# Patient Record
Sex: FEMALE | Race: BLACK OR AFRICAN AMERICAN | Employment: PART TIME | URBAN - METROPOLITAN AREA
[De-identification: names, ages, dates, MRNs, and addresses within clinical notes are randomized per-mention and may not be internally consistent; named-entity substitution may affect disease eponyms.]

---

## 2017-05-22 ENCOUNTER — HOSPITAL ENCOUNTER (EMERGENCY)
Facility: HOSPITAL | Age: 52
Discharge: HOME | End: 2017-05-22
Payer: MEDICAID

## 2017-05-22 ENCOUNTER — APPOINTMENT (OUTPATIENT)
Dept: GENERAL RADIOLOGY | Facility: HOSPITAL | Age: 52
End: 2017-05-22
Payer: MEDICAID

## 2017-05-22 VITALS
RESPIRATION RATE: 16 BRPM | HEIGHT: 63 IN | BODY MASS INDEX: 35.08 KG/M2 | SYSTOLIC BLOOD PRESSURE: 136 MMHG | OXYGEN SATURATION: 99 % | DIASTOLIC BLOOD PRESSURE: 95 MMHG | WEIGHT: 198 LBS | HEART RATE: 62 BPM | TEMPERATURE: 98 F

## 2017-05-22 DIAGNOSIS — R07.9 CHEST PAIN OF UNCERTAIN ETIOLOGY: Primary | ICD-10-CM

## 2017-05-22 PROCEDURE — 71020 XR CHEST PA + LAT CHEST (CPT=71020): CPT

## 2017-05-22 PROCEDURE — 36415 COLL VENOUS BLD VENIPUNCTURE: CPT

## 2017-05-22 PROCEDURE — 84484 ASSAY OF TROPONIN QUANT: CPT

## 2017-05-22 PROCEDURE — 93005 ELECTROCARDIOGRAM TRACING: CPT

## 2017-05-22 PROCEDURE — 99285 EMERGENCY DEPT VISIT HI MDM: CPT

## 2017-05-22 PROCEDURE — 93010 ELECTROCARDIOGRAM REPORT: CPT | Performed by: INTERNAL MEDICINE

## 2017-05-22 PROCEDURE — 80048 BASIC METABOLIC PNL TOTAL CA: CPT

## 2017-05-22 PROCEDURE — 85025 COMPLETE CBC W/AUTO DIFF WBC: CPT

## 2017-05-22 RX ORDER — PANTOPRAZOLE SODIUM 40 MG/1
40 TABLET, DELAYED RELEASE ORAL DAILY
Qty: 30 TABLET | Refills: 0 | Status: SHIPPED | OUTPATIENT
Start: 2017-05-22 | End: 2017-06-21

## 2017-05-23 NOTE — ED PROVIDER NOTES
Patient Seen in: [facility] AND [facility] Emergency Department    History   Patient presents with:  Chest Pain Angina (cardiovascular)      HPI    The patient presents complaining of left-sided chest pain that radiates to her left shoulder.   She states that sym as otherwise stated in HPI.     Physical Exam       ED Triage Vitals   BP 05/22/17 1324 176/89 mmHg   Pulse 05/22/17 1324 52   Resp 05/22/17 1324 16   Temp 05/22/17 1324 97.8 °F (36.6 °C)   Temp src 05/22/17 1324 Oral   SpO2 05/22/17 1324 99 %   O2 Device 0 ------                     CBC W/ DIFFERENTIAL[[id number]]          Abnormal            Final result                 Please view results for these tests on the individual orders.    RAINBOW DRAW BLUE   RAINBOW DRAW GOLD   RAINBOW DRAW LAVENDER   RAINBOW

## 2019-01-17 ENCOUNTER — HOSPITAL ENCOUNTER (EMERGENCY)
Facility: HOSPITAL | Age: 54
Discharge: HOME | End: 2019-01-18
Attending: EMERGENCY MEDICINE
Payer: MEDICAID

## 2019-01-17 VITALS
WEIGHT: 198 LBS | HEIGHT: 62.5 IN | TEMPERATURE: 98 F | BODY MASS INDEX: 35.52 KG/M2 | DIASTOLIC BLOOD PRESSURE: 78 MMHG | SYSTOLIC BLOOD PRESSURE: 140 MMHG | HEART RATE: 60 BPM | OXYGEN SATURATION: 99 % | RESPIRATION RATE: 19 BRPM

## 2019-01-17 DIAGNOSIS — R11.0 NAUSEA: ICD-10-CM

## 2019-01-17 DIAGNOSIS — R10.13 EPIGASTRIC PAIN: Primary | ICD-10-CM

## 2019-01-17 LAB
ALBUMIN SERPL BCP-MCNC: 3.4 G/DL (ref 3.5–4.8)
ALP SERPL-CCNC: 97 U/L (ref 32–100)
ALT SERPL-CCNC: 18 U/L (ref 14–54)
ANION GAP SERPL CALC-SCNC: 15 MMOL/L (ref 0–18)
AST SERPL-CCNC: 23 U/L (ref 15–41)
BASOPHILS # BLD: 0.1 K/UL (ref 0–0.2)
BASOPHILS NFR BLD: 1 %
BILIRUB DIRECT SERPL-MCNC: 0.2 MG/DL (ref 0–0.2)
BILIRUB SERPL-MCNC: 0.4 MG/DL (ref 0.3–1.2)
BUN SERPL-MCNC: 20 MG/DL (ref 8–20)
BUN/CREAT SERPL: 17.9 (ref 10–20)
CALCIUM SERPL-MCNC: 9.7 MG/DL (ref 8.5–10.5)
CHLORIDE SERPL-SCNC: 99 MMOL/L (ref 95–110)
CO2 SERPL-SCNC: 25 MMOL/L (ref 22–32)
CREAT SERPL-MCNC: 1.12 MG/DL (ref 0.5–1.5)
EOSINOPHIL # BLD: 0.1 K/UL (ref 0–0.7)
EOSINOPHIL NFR BLD: 2 %
ERYTHROCYTE [DISTWIDTH] IN BLOOD BY AUTOMATED COUNT: 14.1 % (ref 11–15)
GLUCOSE SERPL-MCNC: 98 MG/DL (ref 70–99)
HCT VFR BLD AUTO: 41.2 % (ref 35–48)
HGB BLD-MCNC: 13.4 G/DL (ref 12–16)
LIPASE SERPL-CCNC: 64 U/L (ref 22–51)
LYMPHOCYTES # BLD: 2.6 K/UL (ref 1–4)
LYMPHOCYTES NFR BLD: 38 %
MCH RBC QN AUTO: 25 PG (ref 27–32)
MCHC RBC AUTO-ENTMCNC: 32.6 G/DL (ref 32–37)
MCV RBC AUTO: 76.8 FL (ref 80–100)
MONOCYTES # BLD: 0.7 K/UL (ref 0–1)
MONOCYTES NFR BLD: 10 %
NEUTROPHILS # BLD AUTO: 3.4 K/UL (ref 1.8–7.7)
NEUTROPHILS NFR BLD: 49 %
OSMOLALITY UR CALC.SUM OF ELEC: 291 MOSM/KG (ref 275–295)
PLATELET # BLD AUTO: 208 K/UL (ref 140–400)
PMV BLD AUTO: 9 FL (ref 7.4–10.3)
POTASSIUM SERPL-SCNC: 3.5 MMOL/L (ref 3.3–5.1)
PROT SERPL-MCNC: 7.2 G/DL (ref 5.9–8.4)
RBC # BLD AUTO: 5.37 M/UL (ref 3.7–5.4)
SODIUM SERPL-SCNC: 139 MMOL/L (ref 136–144)
TROPONIN I SERPL-MCNC: 0 NG/ML (ref ?–0.03)
TROPONIN I SERPL-MCNC: 0 NG/ML (ref ?–0.03)
WBC # BLD AUTO: 6.8 K/UL (ref 4–11)

## 2019-01-17 PROCEDURE — 93010 ELECTROCARDIOGRAM REPORT: CPT | Performed by: EMERGENCY MEDICINE

## 2019-01-17 PROCEDURE — 80076 HEPATIC FUNCTION PANEL: CPT | Performed by: EMERGENCY MEDICINE

## 2019-01-17 PROCEDURE — 99284 EMERGENCY DEPT VISIT MOD MDM: CPT

## 2019-01-17 PROCEDURE — 83690 ASSAY OF LIPASE: CPT | Performed by: EMERGENCY MEDICINE

## 2019-01-17 PROCEDURE — 80048 BASIC METABOLIC PNL TOTAL CA: CPT | Performed by: EMERGENCY MEDICINE

## 2019-01-17 PROCEDURE — 85025 COMPLETE CBC W/AUTO DIFF WBC: CPT | Performed by: EMERGENCY MEDICINE

## 2019-01-17 PROCEDURE — 84484 ASSAY OF TROPONIN QUANT: CPT | Performed by: EMERGENCY MEDICINE

## 2019-01-17 PROCEDURE — 93005 ELECTROCARDIOGRAM TRACING: CPT

## 2019-01-17 PROCEDURE — 36415 COLL VENOUS BLD VENIPUNCTURE: CPT

## 2019-01-17 RX ORDER — FAMOTIDINE 20 MG/1
10 TABLET ORAL ONCE
Status: COMPLETED | OUTPATIENT
Start: 2019-01-17 | End: 2019-01-17

## 2019-01-17 RX ORDER — LOSARTAN POTASSIUM 50 MG/1
50 TABLET ORAL DAILY
COMMUNITY
End: 2019-02-06

## 2019-01-18 RX ORDER — ONDANSETRON 4 MG/1
4 TABLET, ORALLY DISINTEGRATING ORAL EVERY 4 HOURS PRN
Qty: 10 TABLET | Refills: 0 | Status: SHIPPED | OUTPATIENT
Start: 2019-01-18 | End: 2019-01-25

## 2019-01-18 NOTE — ED INITIAL ASSESSMENT (HPI)
Patient reports one week of epigastric pain with left shoulder, and bilateral jaw pain that is intermittent, denying shortness of breath, or palpitations.

## 2019-01-18 NOTE — ED PROVIDER NOTES
Patient Seen in: [facility] AND [facility] Emergency Department    History   Patient presents with:  Chest Pain Angina (cardiovascular)    Stated Complaint: chest pain     HPI    43-year-old female with history of hypertension here with complaints of intermitten HPI.  Constitutional and vital signs reviewed. All other systems reviewed and negative except as noted above.     Physical Exam     ED Triage Vitals [01/17/19 2048]   /60   Pulse 66   Resp 20   Temp 98.4 °F (36.9 °C)   Temp src Oral   SpO2 98 % normal for rate   Normal for rhythm    Pulse Oximeter:  Pulse oximetry on room air is 98%, indicating adequate oxygenation.     PROCEDURES:  none    DIAGNOSTICS:   Labs:  Recent Results (from the past 24 hour(s))   BASIC METABOLIC PANEL (8)    Collection Range    Hold Lavender Auto Resulted    CBC W/ DIFFERENTIAL    Collection Time: 01/17/19  9:25 PM   Result Value Ref Range    WBC 6.8 4.0 - 11.0 K/UL    RBC 5.37 3.70 - 5.40 M/UL    HGB 13.4 12.0 - 16.0 g/dL    HCT 41.2 35.0 - 48.0 %    MCV 76.8 (L) 80.0 - chills, vomiting, pt  expresses understanding and agrees to d/c instructions    EMERGENCY DEPARTMENT MEDICAL DECISION MAKING:  After obtaining the patient's history, performing the physical exam and reviewing the diagnostics, multiple initial diagnoses wer

## 2019-01-18 NOTE — ED NOTES
Mid chest pain x1 week. States she feels like its indigestion. States worse after eating. No pain at this moment. Denies /v/fevers. States some nausea.  Denies SOB

## 2019-02-06 ENCOUNTER — OFFICE VISIT (OUTPATIENT)
Dept: INTERNAL MEDICINE CLINIC | Facility: CLINIC | Age: 54
End: 2019-02-06
Payer: MEDICAID

## 2019-02-06 VITALS
DIASTOLIC BLOOD PRESSURE: 71 MMHG | HEART RATE: 66 BPM | RESPIRATION RATE: 18 BRPM | SYSTOLIC BLOOD PRESSURE: 113 MMHG | BODY MASS INDEX: 36 KG/M2 | WEIGHT: 200 LBS

## 2019-02-06 DIAGNOSIS — M79.7 FIBROMYALGIA: ICD-10-CM

## 2019-02-06 DIAGNOSIS — Z12.11 COLON CANCER SCREENING: ICD-10-CM

## 2019-02-06 DIAGNOSIS — Z12.31 BREAST CANCER SCREENING BY MAMMOGRAM: ICD-10-CM

## 2019-02-06 DIAGNOSIS — R07.9 CHEST PAIN, UNSPECIFIED TYPE: Primary | ICD-10-CM

## 2019-02-06 DIAGNOSIS — K21.00 GASTROESOPHAGEAL REFLUX DISEASE WITH ESOPHAGITIS: ICD-10-CM

## 2019-02-06 PROCEDURE — 99204 OFFICE O/P NEW MOD 45 MIN: CPT | Performed by: INTERNAL MEDICINE

## 2019-02-06 PROCEDURE — 99212 OFFICE O/P EST SF 10 MIN: CPT | Performed by: INTERNAL MEDICINE

## 2019-02-06 RX ORDER — ATENOLOL 50 MG/1
50 TABLET ORAL DAILY
Qty: 90 TABLET | Refills: 1 | Status: SHIPPED | OUTPATIENT
Start: 2019-02-06 | End: 2019-08-30

## 2019-02-06 RX ORDER — AMITRIPTYLINE HYDROCHLORIDE 10 MG/1
TABLET, FILM COATED ORAL
Qty: 60 TABLET | Refills: 0 | Status: SHIPPED | OUTPATIENT
Start: 2019-02-06 | End: 2019-04-26

## 2019-02-06 RX ORDER — ATENOLOL 50 MG/1
50 TABLET ORAL DAILY
COMMUNITY
End: 2019-02-06

## 2019-02-06 RX ORDER — HYDROCHLOROTHIAZIDE 25 MG/1
25 TABLET ORAL DAILY
COMMUNITY
End: 2019-02-06

## 2019-02-06 RX ORDER — PANTOPRAZOLE SODIUM 40 MG/1
40 TABLET, DELAYED RELEASE ORAL
Qty: 30 TABLET | Refills: 1 | Status: SHIPPED | OUTPATIENT
Start: 2019-02-06 | End: 2019-10-15

## 2019-02-06 RX ORDER — HYDROCHLOROTHIAZIDE 25 MG/1
25 TABLET ORAL DAILY
Qty: 90 TABLET | Refills: 1 | Status: SHIPPED | OUTPATIENT
Start: 2019-02-06 | End: 2019-12-17

## 2019-02-09 PROBLEM — K21.00 GASTROESOPHAGEAL REFLUX DISEASE WITH ESOPHAGITIS: Status: ACTIVE | Noted: 2019-02-09

## 2019-02-10 NOTE — PROGRESS NOTES
HPI:    Patient ID: Amarilis Gresham is a 52year old female.   Presents for follow-up after ER visit    HPI  3 weeks ago patient was seen at [place] emergency room for evaluation of the chest pain associated with shortness of breath and radiation of the p tablet Rfl: 1   atenolol 50 MG Oral Tab Take 1 tablet (50 mg total) by mouth daily.  Disp: 90 tablet Rfl: 1   Amitriptyline HCl 10 MG Oral Tab Take  1-2 tab po  At bed time Disp: 60 tablet Rfl: 0   Pantoprazole Sodium 40 MG Oral Tab EC Take 1 tablet (40 mg disease with esophagitis start on pantoprazole 40 mg daily, referred patient to see gastroenterologist  Colon cancer scre disc discussed with patient GERD diet, ening  Breast cancer screening by mammogram  Fibromyalgia patient will try amitriptyline 10 -20

## 2019-02-13 ENCOUNTER — APPOINTMENT (OUTPATIENT)
Dept: LAB | Facility: HOSPITAL | Age: 54
End: 2019-02-13
Attending: INTERNAL MEDICINE
Payer: MEDICAID

## 2019-02-13 DIAGNOSIS — R07.9 CHEST PAIN, UNSPECIFIED TYPE: ICD-10-CM

## 2019-02-13 LAB
ANION GAP SERPL CALC-SCNC: 7 MMOL/L (ref 0–18)
BUN BLD-MCNC: 20 MG/DL (ref 7–18)
BUN/CREAT SERPL: 17.9 (ref 10–20)
CALCIUM BLD-MCNC: 9.5 MG/DL (ref 8.5–10.1)
CHLORIDE SERPL-SCNC: 108 MMOL/L (ref 98–107)
CHOLEST SMN-MCNC: 209 MG/DL (ref ?–200)
CO2 SERPL-SCNC: 29 MMOL/L (ref 21–32)
CREAT BLD-MCNC: 1.12 MG/DL (ref 0.55–1.02)
GLUCOSE BLD-MCNC: 92 MG/DL (ref 70–99)
HDLC SERPL-MCNC: 84 MG/DL (ref 40–59)
LDLC SERPL CALC-MCNC: 116 MG/DL (ref ?–100)
NONHDLC SERPL-MCNC: 125 MG/DL (ref ?–130)
OSMOLALITY SERPL CALC.SUM OF ELEC: 300 MOSM/KG (ref 275–295)
POTASSIUM SERPL-SCNC: 3.8 MMOL/L (ref 3.5–5.1)
SODIUM SERPL-SCNC: 144 MMOL/L (ref 136–145)
TRIGL SERPL-MCNC: 47 MG/DL (ref 30–149)

## 2019-02-13 PROCEDURE — 80061 LIPID PANEL: CPT

## 2019-02-13 PROCEDURE — 80048 BASIC METABOLIC PNL TOTAL CA: CPT

## 2019-02-13 PROCEDURE — 36415 COLL VENOUS BLD VENIPUNCTURE: CPT

## 2019-02-16 ENCOUNTER — TELEPHONE (OUTPATIENT)
Dept: INTERNAL MEDICINE CLINIC | Facility: CLINIC | Age: 54
End: 2019-02-16

## 2019-02-16 DIAGNOSIS — R89.9 ABNORMAL LABORATORY TEST RESULT: Primary | ICD-10-CM

## 2019-02-22 ENCOUNTER — HOSPITAL ENCOUNTER (OUTPATIENT)
Dept: MAMMOGRAPHY | Age: 54
Discharge: HOME | End: 2019-02-22
Attending: INTERNAL MEDICINE
Payer: MEDICAID

## 2019-02-22 DIAGNOSIS — Z12.31 BREAST CANCER SCREENING BY MAMMOGRAM: ICD-10-CM

## 2019-02-22 PROCEDURE — 77063 BREAST TOMOSYNTHESIS BI: CPT | Performed by: INTERNAL MEDICINE

## 2019-02-22 PROCEDURE — 77067 SCR MAMMO BI INCL CAD: CPT | Performed by: INTERNAL MEDICINE

## 2019-03-08 ENCOUNTER — TELEPHONE (OUTPATIENT)
Dept: GASTROENTEROLOGY | Facility: CLINIC | Age: 54
End: 2019-03-08

## 2019-03-08 ENCOUNTER — OFFICE VISIT (OUTPATIENT)
Dept: GASTROENTEROLOGY | Facility: CLINIC | Age: 54
End: 2019-03-08
Payer: MEDICAID

## 2019-03-08 VITALS — BODY MASS INDEX: 38 KG/M2 | SYSTOLIC BLOOD PRESSURE: 138 MMHG | WEIGHT: 212 LBS | DIASTOLIC BLOOD PRESSURE: 80 MMHG

## 2019-03-08 DIAGNOSIS — R71.8 MICROCYTOSIS: ICD-10-CM

## 2019-03-08 DIAGNOSIS — K59.00 CONSTIPATION, UNSPECIFIED CONSTIPATION TYPE: ICD-10-CM

## 2019-03-08 DIAGNOSIS — Z12.11 SCREEN FOR COLON CANCER: Primary | ICD-10-CM

## 2019-03-08 DIAGNOSIS — R12 HEARTBURN: ICD-10-CM

## 2019-03-08 DIAGNOSIS — Z12.11 ENCOUNTER FOR SCREENING COLONOSCOPY: Primary | ICD-10-CM

## 2019-03-08 PROCEDURE — 99244 OFF/OP CNSLTJ NEW/EST MOD 40: CPT | Performed by: PHYSICIAN ASSISTANT

## 2019-03-08 PROCEDURE — 99212 OFFICE O/P EST SF 10 MIN: CPT | Performed by: PHYSICIAN ASSISTANT

## 2019-03-08 RX ORDER — POLYETHYLENE GLYCOL 3350, SODIUM CHLORIDE, SODIUM BICARBONATE, POTASSIUM CHLORIDE 420; 11.2; 5.72; 1.48 G/4L; G/4L; G/4L; G/4L
POWDER, FOR SOLUTION ORAL
Qty: 1 BOTTLE | Refills: 0 | Status: ON HOLD | OUTPATIENT
Start: 2019-03-08 | End: 2019-07-17

## 2019-03-08 NOTE — PATIENT INSTRUCTIONS
1. Schedule colonoscopy and upper endoscopy with MAC anesthesia @ [facility] - Dr. Venice Hogan or Sandra Hensley. 2.  bowel prep from pharmacy - I have prescribed Trilyte split dose preparation    3.  Medication Changes:    ** If MAC @ [facility]/NE:    - HOLD ACE it remains high, follow up with your primary care physician. If BP elevated on day of procedure(s), it is MD's discretion that the procedure may be cancelled.

## 2019-03-08 NOTE — TELEPHONE ENCOUNTER
Scheduled for:  Colonoscopy 50313 & EGD 70675  Provider Name:   Date:  3/29/19  Location:  [facility]  Sedation:  MAC  Time:  9am pt told to arrive at 8am  Prep:Trilyte  Meds/Allergies Reconciled?:  Betsy Burgos reviewed  Diagnosis with codes:Screen Z12.11; Co

## 2019-03-08 NOTE — H&P
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  While diet and lifestyle factors cannot be excluded, differential to include gastritis, esophagitis, H. Pylori infection, ulcer disease, Nguyen's Esophagus, hiatal hernia given obesity or less likely Thyroid Stim Hormone)      Meds This Visit:  Requested Prescriptions     Signed Prescriptions Disp Refills   • PEG 3350-KCl-Na Bicarb-NaCl (TRILYTE) 420 g Oral Recon Soln 1 Bottle 0     Sig: Split dose preparation - take as directed.        Imaging & Referr

## 2019-03-13 ENCOUNTER — TELEPHONE (OUTPATIENT)
Dept: GASTROENTEROLOGY | Facility: CLINIC | Age: 54
End: 2019-03-13

## 2019-03-13 DIAGNOSIS — R12 HEARTBURN: ICD-10-CM

## 2019-03-13 DIAGNOSIS — K59.00 CONSTIPATION, UNSPECIFIED CONSTIPATION TYPE: ICD-10-CM

## 2019-03-13 DIAGNOSIS — Z12.11 SCREEN FOR COLON CANCER: Primary | ICD-10-CM

## 2019-03-13 NOTE — TELEPHONE ENCOUNTER
, is pt able to be put on your schedule on 4.13 due to work other days. She say PB and was instructed to get scheduled with you  or . She was originally scheduled with MG Mario but due to an error in the schedule that day, she cant have the d

## 2019-03-13 NOTE — TELEPHONE ENCOUNTER
Scheduled for:  Colonoscopy [phone] & EGD 52039  Provider Name:   Date:  4/1319  Location:  [facility]  Sedation:  MAC  Time:  830am pt told to arrive at 730am  Prep:Trilyte  Meds/Allergies Reconciled?:  Sunshine Pérez reviewed  Diagnosis with codes:Screen Z12.

## 2019-03-14 NOTE — TELEPHONE ENCOUNTER
Pt is already scheduled and taken care of. Disregard this TE.  Pt is with Dr. Erasmo Sanchez and new TE with procedure information there

## 2019-03-15 ENCOUNTER — APPOINTMENT (OUTPATIENT)
Dept: LAB | Facility: HOSPITAL | Age: 54
End: 2019-03-15
Attending: PHYSICIAN ASSISTANT
Payer: MEDICAID

## 2019-03-15 DIAGNOSIS — R71.8 MICROCYTOSIS: ICD-10-CM

## 2019-03-15 DIAGNOSIS — K59.00 CONSTIPATION, UNSPECIFIED CONSTIPATION TYPE: ICD-10-CM

## 2019-03-15 LAB
DEPRECATED HBV CORE AB SER IA-ACNC: 456.9 NG/ML (ref 18–340)
IRON SATURATION: 19 % (ref 15–50)
IRON SERPL-MCNC: 66 UG/DL (ref 50–170)
TOTAL IRON BINDING CAPACITY: 347 UG/DL (ref 240–450)
TRANSFERRIN SERPL-MCNC: 233 MG/DL (ref 200–360)
TSI SER-ACNC: 1.37 MIU/ML (ref 0.36–3.74)

## 2019-03-15 PROCEDURE — 84443 ASSAY THYROID STIM HORMONE: CPT

## 2019-03-15 PROCEDURE — 82728 ASSAY OF FERRITIN: CPT

## 2019-03-15 PROCEDURE — 84466 ASSAY OF TRANSFERRIN: CPT

## 2019-03-15 PROCEDURE — 83540 ASSAY OF IRON: CPT

## 2019-03-15 PROCEDURE — 36415 COLL VENOUS BLD VENIPUNCTURE: CPT

## 2019-04-01 ENCOUNTER — HOSPITAL ENCOUNTER (OUTPATIENT)
Dept: CV DIAGNOSTICS | Facility: HOSPITAL | Age: 54
Discharge: HOME | End: 2019-04-01
Attending: INTERNAL MEDICINE
Payer: MEDICAID

## 2019-04-01 DIAGNOSIS — R07.9 CHEST PAIN, UNSPECIFIED TYPE: ICD-10-CM

## 2019-04-01 PROCEDURE — 93018 CV STRESS TEST I&R ONLY: CPT | Performed by: INTERNAL MEDICINE

## 2019-04-01 PROCEDURE — 93017 CV STRESS TEST TRACING ONLY: CPT | Performed by: INTERNAL MEDICINE

## 2019-04-01 PROCEDURE — 93016 CV STRESS TEST SUPVJ ONLY: CPT | Performed by: INTERNAL MEDICINE

## 2019-04-03 ENCOUNTER — TELEPHONE (OUTPATIENT)
Dept: INTERNAL MEDICINE CLINIC | Facility: CLINIC | Age: 54
End: 2019-04-03

## 2019-04-03 DIAGNOSIS — R07.89 OTHER CHEST PAIN: Primary | ICD-10-CM

## 2019-04-03 DIAGNOSIS — I49.9 CARDIAC ARRHYTHMIA, UNSPECIFIED CARDIAC ARRHYTHMIA TYPE: ICD-10-CM

## 2019-04-11 ENCOUNTER — TELEPHONE (OUTPATIENT)
Dept: GASTROENTEROLOGY | Facility: CLINIC | Age: 54
End: 2019-04-11

## 2019-04-11 DIAGNOSIS — R12 HEARTBURN: ICD-10-CM

## 2019-04-11 DIAGNOSIS — K59.00 CONSTIPATION, UNSPECIFIED CONSTIPATION TYPE: ICD-10-CM

## 2019-04-11 DIAGNOSIS — Z12.11 COLON CANCER SCREENING: Primary | ICD-10-CM

## 2019-04-11 NOTE — TELEPHONE ENCOUNTER
Pt has upcoming procedure leslie Sat 4/13/19, pt has additional questions, pls call at:[phone],thanks.

## 2019-04-16 ENCOUNTER — HOSPITAL ENCOUNTER (OUTPATIENT)
Dept: CV DIAGNOSTICS | Facility: HOSPITAL | Age: 54
Discharge: HOME | End: 2019-04-16
Attending: INTERNAL MEDICINE
Payer: MEDICAID

## 2019-04-16 DIAGNOSIS — R07.89 OTHER CHEST PAIN: ICD-10-CM

## 2019-04-16 DIAGNOSIS — I49.9 CARDIAC ARRHYTHMIA, UNSPECIFIED CARDIAC ARRHYTHMIA TYPE: ICD-10-CM

## 2019-04-16 PROCEDURE — 93306 TTE W/DOPPLER COMPLETE: CPT | Performed by: INTERNAL MEDICINE

## 2019-04-17 ENCOUNTER — TELEPHONE (OUTPATIENT)
Dept: INTERNAL MEDICINE CLINIC | Facility: CLINIC | Age: 54
End: 2019-04-17

## 2019-04-17 DIAGNOSIS — R07.89 OTHER CHEST PAIN: Primary | ICD-10-CM

## 2019-04-18 NOTE — TELEPHONE ENCOUNTER
Reported to patient normal echocardiogram, she states that she did walking test at the end of the test she had to cough a lot, I advised her that to complete workup for the chest pain she should have stress nuclear test, even EGD and colonoscopy was cancel

## 2019-04-18 NOTE — TELEPHONE ENCOUNTER
See TE under Dr. Hernandez Prior from 4/17/19:    \"Reported to patient normal echocardiogram, she states that she did walking test at the end of the test she had to cough a lot, I advised her that to complete workup for the chest pain she should have stress nuclear te

## 2019-04-26 ENCOUNTER — OFFICE VISIT (OUTPATIENT)
Dept: FAMILY MEDICINE CLINIC | Facility: CLINIC | Age: 54
End: 2019-04-26
Payer: MEDICAID

## 2019-04-26 VITALS
HEART RATE: 82 BPM | WEIGHT: 209 LBS | SYSTOLIC BLOOD PRESSURE: 142 MMHG | HEIGHT: 63 IN | BODY MASS INDEX: 37.03 KG/M2 | RESPIRATION RATE: 18 BRPM | DIASTOLIC BLOOD PRESSURE: 84 MMHG | TEMPERATURE: 99 F

## 2019-04-26 DIAGNOSIS — J06.9 VIRAL UPPER RESPIRATORY INFECTION: Primary | ICD-10-CM

## 2019-04-26 PROCEDURE — 99213 OFFICE O/P EST LOW 20 MIN: CPT | Performed by: PHYSICIAN ASSISTANT

## 2019-04-26 PROCEDURE — 99212 OFFICE O/P EST SF 10 MIN: CPT | Performed by: PHYSICIAN ASSISTANT

## 2019-04-26 RX ORDER — FLUTICASONE PROPIONATE 50 MCG
2 SPRAY, SUSPENSION (ML) NASAL DAILY
Qty: 1 BOTTLE | Refills: 3 | Status: SHIPPED | OUTPATIENT
Start: 2019-04-26 | End: 2019-06-12

## 2019-04-26 RX ORDER — CETIRIZINE HYDROCHLORIDE 10 MG/1
10 TABLET ORAL DAILY
Qty: 90 TABLET | Refills: 3 | Status: SHIPPED | OUTPATIENT
Start: 2019-04-26 | End: 2019-06-12

## 2019-04-26 RX ORDER — BENZONATATE 200 MG/1
200 CAPSULE ORAL 3 TIMES DAILY PRN
Qty: 30 CAPSULE | Refills: 0 | Status: SHIPPED | OUTPATIENT
Start: 2019-04-26 | End: 2019-06-12

## 2019-04-26 RX ORDER — AMITRIPTYLINE HYDROCHLORIDE 10 MG/1
TABLET, FILM COATED ORAL
Qty: 60 TABLET | Refills: 0 | Status: SHIPPED | OUTPATIENT
Start: 2019-04-26 | End: 2019-06-28

## 2019-04-26 NOTE — PROGRESS NOTES
HPI:     HPI  52year-old female is here in the office complaining of runny nose, dry cough and nasal congestion since yesterday. Patient has not tried medications OTC for symptoms relief.   Patient denies of chest pain, SOB, N/V/C/D, fever, dizziness, sync hysterectomy.    Past Medical History:     Past Medical History:   Diagnosis Date   • High blood pressure        Past Surgical History:     Past Surgical History:   Procedure Laterality Date   • Cyst aspiration right  2014   • Hysterectomy      38   • Desmond n Not on file    Social History Narrative      Not on file      Review of Systems:   Review of Systems   Constitutional: Negative. Negative for activity change, chills, fatigue and fever. HENT: Positive for congestion and rhinorrhea.  Negative for ear pain Neurological: She is alert and oriented to person, place, and time. She has normal reflexes. Skin: Skin is intact. No rash noted. Psychiatric: She has a normal mood and affect.        Assessment and Plan[de-identified]     Problem List Items Addressed This Visit

## 2019-04-27 PROBLEM — J06.9 VIRAL UPPER RESPIRATORY INFECTION: Status: ACTIVE | Noted: 2019-04-27

## 2019-04-27 NOTE — ASSESSMENT & PLAN NOTE
Discussed with patient that it's likely viral in origin. Discussed with patient to rest when needed but no activity restrictions, use mask if coughing/sneezing, regular hand washing with soap and water, adequate hydration and nutrition.  Use tylenol as dire

## 2019-04-29 ENCOUNTER — TELEPHONE (OUTPATIENT)
Dept: FAMILY MEDICINE CLINIC | Facility: CLINIC | Age: 54
End: 2019-04-29

## 2019-04-29 NOTE — TELEPHONE ENCOUNTER
Spoke with [facility] pharmacist and reports cough suppressants are not covered under [facility], but plain Promethazine or Promethazine DM are inexpensive as options    Please advise on substitute for Benzonate ordered 4/26/19.

## 2019-04-29 NOTE — TELEPHONE ENCOUNTER
Pt called in stating that the following medication is not covered by her insurance:  Pt is requesting an alternative affordable medication.   Please advise       Current Outpatient Medications:     •  benzonatate 200 MG Oral Cap, Take 1 capsule (200 mg tota

## 2019-04-29 NOTE — TELEPHONE ENCOUNTER
Prescription for Promethazine DM 1Tsp every 6 hours #100 ML. No refills has been called into [address].

## 2019-05-02 NOTE — TELEPHONE ENCOUNTER
Pt rescheduled stress test for 5/23/19, will ensure with Dr. Sybil Etienne that pt has cardiac clearance to proceed w/ rescheduling CLN/EGD once tests are completed.

## 2019-05-23 ENCOUNTER — HOSPITAL ENCOUNTER (OUTPATIENT)
Dept: NUCLEAR MEDICINE | Facility: HOSPITAL | Age: 54
Discharge: HOME | End: 2019-05-23
Attending: INTERNAL MEDICINE
Payer: MEDICAID

## 2019-05-23 ENCOUNTER — HOSPITAL ENCOUNTER (OUTPATIENT)
Dept: CV DIAGNOSTICS | Facility: HOSPITAL | Age: 54
Discharge: HOME | End: 2019-05-23
Attending: INTERNAL MEDICINE
Payer: MEDICAID

## 2019-05-23 DIAGNOSIS — R07.89 OTHER CHEST PAIN: ICD-10-CM

## 2019-05-23 PROCEDURE — 93018 CV STRESS TEST I&R ONLY: CPT | Performed by: INTERNAL MEDICINE

## 2019-05-23 PROCEDURE — 93017 CV STRESS TEST TRACING ONLY: CPT | Performed by: INTERNAL MEDICINE

## 2019-05-23 PROCEDURE — 78452 HT MUSCLE IMAGE SPECT MULT: CPT | Performed by: INTERNAL MEDICINE

## 2019-05-23 PROCEDURE — 93016 CV STRESS TEST SUPVJ ONLY: CPT | Performed by: INTERNAL MEDICINE

## 2019-05-23 RX ORDER — SODIUM CHLORIDE 9 MG/ML
INJECTION, SOLUTION INTRAVENOUS
Status: COMPLETED
Start: 2019-05-23 | End: 2019-05-23

## 2019-05-23 RX ADMIN — SODIUM CHLORIDE 50 ML: 9 INJECTION, SOLUTION INTRAVENOUS at 11:16:00

## 2019-05-29 ENCOUNTER — TELEPHONE (OUTPATIENT)
Dept: GASTROENTEROLOGY | Facility: CLINIC | Age: 54
End: 2019-05-29

## 2019-05-29 DIAGNOSIS — R12 HEARTBURN: ICD-10-CM

## 2019-05-29 DIAGNOSIS — Z12.11 COLON CANCER SCREENING: Primary | ICD-10-CM

## 2019-05-29 DIAGNOSIS — K59.00 CONSTIPATION, UNSPECIFIED CONSTIPATION TYPE: ICD-10-CM

## 2019-05-29 NOTE — TELEPHONE ENCOUNTER
Please call patient gastroenterologist requires clearance for procedure, I would like to see patient first reviewed her recent test results

## 2019-05-29 NOTE — TELEPHONE ENCOUNTER
Dr. Hao Juárez-    Pt underwent additional cardiac testing on 5/23/19. Please advise if she has cardiac clearance to proceed with rescheduling her CLN/EGD, thank you.

## 2019-05-29 NOTE — TELEPHONE ENCOUNTER
Please schedule pre op appt with Dr. Joaquim Veloz. Thank you. Shelley Sanchez Please respond to pool: EM IM LMB LPN/CMA

## 2019-05-29 NOTE — TELEPHONE ENCOUNTER
Pt contacted and reviewed TE from 4/11/19.  I explained she needs to see Dr. Ling Alcala in order to obtain cardiac clearance before she is able to schedule CLN/EGD, she verbalized understanding and will f/u with PCP first.

## 2019-06-12 ENCOUNTER — TELEPHONE (OUTPATIENT)
Dept: INTERNAL MEDICINE CLINIC | Facility: CLINIC | Age: 54
End: 2019-06-12

## 2019-06-12 ENCOUNTER — OFFICE VISIT (OUTPATIENT)
Dept: INTERNAL MEDICINE CLINIC | Facility: CLINIC | Age: 54
End: 2019-06-12
Payer: MEDICAID

## 2019-06-12 VITALS
HEIGHT: 63.5 IN | TEMPERATURE: 98 F | DIASTOLIC BLOOD PRESSURE: 83 MMHG | WEIGHT: 210 LBS | SYSTOLIC BLOOD PRESSURE: 132 MMHG | RESPIRATION RATE: 18 BRPM | HEART RATE: 60 BPM | BODY MASS INDEX: 36.75 KG/M2

## 2019-06-12 DIAGNOSIS — K21.00 GASTROESOPHAGEAL REFLUX DISEASE WITH ESOPHAGITIS: Primary | ICD-10-CM

## 2019-06-12 PROCEDURE — 99213 OFFICE O/P EST LOW 20 MIN: CPT | Performed by: INTERNAL MEDICINE

## 2019-06-12 PROCEDURE — 99212 OFFICE O/P EST SF 10 MIN: CPT | Performed by: INTERNAL MEDICINE

## 2019-06-12 NOTE — TELEPHONE ENCOUNTER
Dr. Netta Cruz, pt has been cleared by PCP (Dr. Wendy Vallecillo), Per 4/11/19 TE pt required clearance from pcp d/t cardiac testing. Please advise if we can keep same orders from 3/13/19 schedule surgery TE. Thank you.

## 2019-06-12 NOTE — PROGRESS NOTES
HPI:    Patient ID: Leena Low is a 52year old female. Manuela Diallo for follow-up on chest discomfort    HPI  Patient seen in follow-up on chest discomforts.   She has been epigastric and chest discomfort on the off, worse after she eats, states that gets Comment:Added based on information entered during case             entry, please review and add reactions, type, and             severity as needed  Morphine Sulfate        ITCHING   /83 (BP Location: Left arm, Patient Position: S

## 2019-06-13 NOTE — TELEPHONE ENCOUNTER
PATIENT OF PB:    Dr. Emily Allen has provided cardiac clearance for the pt to proceed with CLN/EGD. Please advise if OK to use the same orders as per your OV on 3/8/19?

## 2019-06-13 NOTE — TELEPHONE ENCOUNTER
Brian Madsen      6/13/19 9:45 AM   Note      Pt states that she has cardiac clearance now (see 5/29/19 TE)  and would like to schedule colon/EGD. Please call to schedule.

## 2019-06-14 NOTE — TELEPHONE ENCOUNTER
Yes, reviewed medications for MAC protocol - no changes from orders given. Noted cardiac clearance. Patient to follow up in office with me 8-12 weeks after procedures thank you.

## 2019-06-17 NOTE — TELEPHONE ENCOUNTER
Dash Low routed conversation to  "[employer]" 13 minutes ago (8:21 AM)      Monica Zhu 14 minutes ago (8:20 AM)         Pt checking status on message. Pls call - ANXIOUS to schedule. Thank you.          Documentation

## 2019-06-17 NOTE — TELEPHONE ENCOUNTER
Scheduled for:  Colonoscopy [phone] and EGD [address]  Provider Name: Dr. Jason Freeman  Date:  7/17/19  Location:  [facility]  Sedation:  MAC  Time:   7228 (pt is aware to arrive at 0930)   Prep:  Lucrecia, mailed new instructions on 6/18/19  Meds/Allergies Reconciled?:  Physi

## 2019-06-20 NOTE — TELEPHONE ENCOUNTER
I spoke with this patient to schedule her procedure which she stated that her procedure has been scheduled.  I looked in her chart and it has been scheduled for 7/17/19 with Dr. Armand Almeida, please see TE 5/29/19 or scheduling information

## 2019-06-20 NOTE — ADDENDUM NOTE
Addended byLorenzo March on: 6/17/2019 02:22 PM     Modules accepted: Orders Additional Area 2 Units: 0

## 2019-06-21 ENCOUNTER — TELEPHONE (OUTPATIENT)
Dept: GASTROENTEROLOGY | Facility: CLINIC | Age: 54
End: 2019-06-21

## 2019-06-24 NOTE — TELEPHONE ENCOUNTER
I spoke with this patient to reschedule her procedure but she decided to keep the original date since she does not have to start drinking the prep until 5pm

## 2019-06-25 ENCOUNTER — NURSE TRIAGE (OUTPATIENT)
Dept: OTHER | Age: 54
End: 2019-06-25

## 2019-06-25 NOTE — TELEPHONE ENCOUNTER
Action Requested: Summary for Provider     []  Critical Lab, Recommendations Needed  [] Need Additional Advice  []   FYI    []   Need Orders  [] Need Medications Sent to Pharmacy  []  Other     SUMMARY: Patient has a rash present on hands and wrists; les

## 2019-06-27 NOTE — TELEPHONE ENCOUNTER
Dr Hao Juárez, patient would like to see you about this rash, she has time Friday between 8 and 12 or any time Monday or Tuesday, it's still present and she would like to clear it up before she does the colonoscopy next month, offered appt with another provide

## 2019-06-28 ENCOUNTER — OFFICE VISIT (OUTPATIENT)
Dept: INTERNAL MEDICINE CLINIC | Facility: CLINIC | Age: 54
End: 2019-06-28
Payer: MEDICAID

## 2019-06-28 VITALS
BODY MASS INDEX: 36.64 KG/M2 | HEART RATE: 85 BPM | SYSTOLIC BLOOD PRESSURE: 147 MMHG | WEIGHT: 209.38 LBS | DIASTOLIC BLOOD PRESSURE: 85 MMHG | HEIGHT: 63.5 IN | RESPIRATION RATE: 15 BRPM | TEMPERATURE: 98 F

## 2019-06-28 DIAGNOSIS — M79.7 FIBROMYALGIA: ICD-10-CM

## 2019-06-28 DIAGNOSIS — L30.9 DERMATITIS: Primary | ICD-10-CM

## 2019-06-28 PROCEDURE — 99212 OFFICE O/P EST SF 10 MIN: CPT | Performed by: INTERNAL MEDICINE

## 2019-06-28 PROCEDURE — 99213 OFFICE O/P EST LOW 20 MIN: CPT | Performed by: INTERNAL MEDICINE

## 2019-06-28 RX ORDER — MOMETASONE FUROATE 1 MG/G
CREAM TOPICAL
Qty: 60 G | Refills: 0 | Status: SHIPPED | OUTPATIENT
Start: 2019-06-28 | End: 2019-10-15

## 2019-06-28 RX ORDER — AMITRIPTYLINE HYDROCHLORIDE 10 MG/1
TABLET, FILM COATED ORAL
Qty: 60 TABLET | Refills: 3 | Status: SHIPPED | OUTPATIENT
Start: 2019-06-28 | End: 2020-06-23

## 2019-06-29 NOTE — PROGRESS NOTES
HPI:    Patient ID: Kevin Bethea is a 52year old female.   Presents for evaluation of the rash  HPI  Patient states that about 2 weeks she started to notice itchy rash ondorsal aspect of both hands , she noticed these symptoms after she used gloves lat entry, please review and add reactions, type, and             severity as needed  Morphine Sulfate        ITCHING   /85   Pulse 85   Temp 98.2 °F (36.8 °C) (Oral)   Resp 15   Ht 5' 3.5\" (1.613 m)   Wt 209 lb 6.4 oz (95 kg)   BMI 36.51 kg/m²    Phys

## 2019-07-16 ENCOUNTER — TELEPHONE (OUTPATIENT)
Dept: GASTROENTEROLOGY | Facility: CLINIC | Age: 54
End: 2019-07-16

## 2019-07-16 NOTE — TELEPHONE ENCOUNTER
Pt contacted and states she ate red [place] ice this morning- states it was \"a small one, but then I realized I couldn't have anything red, blue or purple. \" States that was 1 hr ago and wants to know if OK to proceed? Please advise, thank you.     Of n

## 2019-07-16 NOTE — TELEPHONE ENCOUNTER
Pt has questions regarding prep kit. Pt has CLN scheduled for tomorrow.  Pt starts prep kit at 5pm. Please call [phone]

## 2019-07-16 NOTE — TELEPHONE ENCOUNTER
Pt contacted and reviewed Dr. Whitfield Lobe message below. I emphasized she is to remain on a CLEAR LIQUID diet from this point forward; no solid food, nothing red, blue, or purple, and no dairy products.  She verbalized understanding and did not have further questio

## 2019-07-17 ENCOUNTER — TELEPHONE (OUTPATIENT)
Dept: INTERNAL MEDICINE CLINIC | Facility: CLINIC | Age: 54
End: 2019-07-17

## 2019-07-17 ENCOUNTER — ANESTHESIA (OUTPATIENT)
Dept: ENDOSCOPY | Facility: HOSPITAL | Age: 54
End: 2019-07-17
Payer: MEDICAID

## 2019-07-17 ENCOUNTER — HOSPITAL ENCOUNTER (OUTPATIENT)
Facility: HOSPITAL | Age: 54
Setting detail: HOSPITAL OUTPATIENT SURGERY
Discharge: HOME | End: 2019-07-17
Attending: INTERNAL MEDICINE | Admitting: INTERNAL MEDICINE
Payer: MEDICAID

## 2019-07-17 ENCOUNTER — ANESTHESIA EVENT (OUTPATIENT)
Dept: ENDOSCOPY | Facility: HOSPITAL | Age: 54
End: 2019-07-17
Payer: MEDICAID

## 2019-07-17 DIAGNOSIS — Z12.11 COLON CANCER SCREENING: ICD-10-CM

## 2019-07-17 DIAGNOSIS — K59.00 CONSTIPATION, UNSPECIFIED CONSTIPATION TYPE: ICD-10-CM

## 2019-07-17 DIAGNOSIS — R12 HEARTBURN: ICD-10-CM

## 2019-07-17 PROCEDURE — 0DB68ZX EXCISION OF STOMACH, VIA NATURAL OR ARTIFICIAL OPENING ENDOSCOPIC, DIAGNOSTIC: ICD-10-PCS | Performed by: INTERNAL MEDICINE

## 2019-07-17 PROCEDURE — 45381 COLONOSCOPY SUBMUCOUS NJX: CPT | Performed by: INTERNAL MEDICINE

## 2019-07-17 PROCEDURE — 43239 EGD BIOPSY SINGLE/MULTIPLE: CPT | Performed by: INTERNAL MEDICINE

## 2019-07-17 PROCEDURE — 3E0H8GC INTRODUCTION OF OTHER THERAPEUTIC SUBSTANCE INTO LOWER GI, VIA NATURAL OR ARTIFICIAL OPENING ENDOSCOPIC: ICD-10-PCS | Performed by: INTERNAL MEDICINE

## 2019-07-17 PROCEDURE — 0DBK8ZX EXCISION OF ASCENDING COLON, VIA NATURAL OR ARTIFICIAL OPENING ENDOSCOPIC, DIAGNOSTIC: ICD-10-PCS | Performed by: INTERNAL MEDICINE

## 2019-07-17 PROCEDURE — 0DB38ZX EXCISION OF LOWER ESOPHAGUS, VIA NATURAL OR ARTIFICIAL OPENING ENDOSCOPIC, DIAGNOSTIC: ICD-10-PCS | Performed by: INTERNAL MEDICINE

## 2019-07-17 PROCEDURE — 0DB98ZX EXCISION OF DUODENUM, VIA NATURAL OR ARTIFICIAL OPENING ENDOSCOPIC, DIAGNOSTIC: ICD-10-PCS | Performed by: INTERNAL MEDICINE

## 2019-07-17 PROCEDURE — 0DBN8ZX EXCISION OF SIGMOID COLON, VIA NATURAL OR ARTIFICIAL OPENING ENDOSCOPIC, DIAGNOSTIC: ICD-10-PCS | Performed by: INTERNAL MEDICINE

## 2019-07-17 PROCEDURE — 45385 COLONOSCOPY W/LESION REMOVAL: CPT | Performed by: INTERNAL MEDICINE

## 2019-07-17 RX ORDER — SODIUM CHLORIDE, SODIUM LACTATE, POTASSIUM CHLORIDE, CALCIUM CHLORIDE 600; 310; 30; 20 MG/100ML; MG/100ML; MG/100ML; MG/100ML
INJECTION, SOLUTION INTRAVENOUS CONTINUOUS
Status: DISCONTINUED | OUTPATIENT
Start: 2019-07-17 | End: 2019-07-17

## 2019-07-17 RX ORDER — LIDOCAINE HYDROCHLORIDE 10 MG/ML
INJECTION, SOLUTION EPIDURAL; INFILTRATION; INTRACAUDAL; PERINEURAL AS NEEDED
Status: DISCONTINUED | OUTPATIENT
Start: 2019-07-17 | End: 2019-07-17 | Stop reason: SURG

## 2019-07-17 RX ORDER — NALOXONE HYDROCHLORIDE 0.4 MG/ML
80 INJECTION, SOLUTION INTRAMUSCULAR; INTRAVENOUS; SUBCUTANEOUS AS NEEDED
Status: DISCONTINUED | OUTPATIENT
Start: 2019-07-17 | End: 2019-07-17

## 2019-07-17 RX ADMIN — LIDOCAINE HYDROCHLORIDE 100 MG: 10 INJECTION, SOLUTION EPIDURAL; INFILTRATION; INTRACAUDAL; PERINEURAL at 10:28:00

## 2019-07-17 RX ADMIN — SODIUM CHLORIDE, SODIUM LACTATE, POTASSIUM CHLORIDE, CALCIUM CHLORIDE: 600; 310; 30; 20 INJECTION, SOLUTION INTRAVENOUS at 11:31:00

## 2019-07-17 NOTE — H&P
History & Physical Examination    Patient Name: Becky Reyes  MRN: [id number]  CSN: [id number]  YOB: 1965    Diagnosis: GERD, screening      Medications Prior to Admission:  Amitriptyline HCl 10 MG Oral Tab Take  1-2 tab po  At bed time Liv Mejía Not Mat Aunt but Paternal   • Breast Cancer Paternal Aunt         in her 52's     Social History    Tobacco Use      Smoking status: Never Smoker      Smokeless tobacco: Never Used    Alcohol use: Yes      Comment: Occ        SYSTEM Check if Review

## 2019-07-17 NOTE — OPERATIVE REPORT
ESOPHAGOGASTRODUODENOSCOPY (EGD) & COLONOSCOPY REPORT    Arya Merrill     1965 Age 52year old   PCP Abisai Jara MD Endoscopist Kati Marquez MD     Date of procedure: 19    Procedure: EGD w/cold biopsy & Colonoscopy w/cold snare Views of the colon were good with washing. I then carefully withdrew the instrument from the patient who tolerated the procedure well. Complications: None    EGD findings:      1. Esophagus:  The squamocolumnar junction was noted at 39 cm and appeared r rey the site 3-5 cm proximally and distally to the lesion with submucosal injection. 2. Diverticulosis: mild throughout the colon.   3. Terminal ileum: the visualized mucosa appeared normal.  4. A retroflexed view of the rectum revealed small internal

## 2019-07-17 NOTE — ANESTHESIA PREPROCEDURE EVALUATION
Anesthesia PreOp Note    HPI:     Claudeen Darby is a 52year old female who presents for preoperative consultation requested by: Gregorio Mcpherson MD    Date of Surgery: 7/17/2019    Procedure(s):  COLONOSCOPY  ESOPHAGOGASTRODUODENOSCOPY (EGD)  Indicatio 3350-KCl-Na Bicarb-NaCl (TRILYTE) 420 g Oral Recon Soln Split dose preparation - take as directed.  Disp: 1 Bottle Rfl: 0 Not Taking       Current Facility-Administered Medications Ordered in Epic:  lactated ringers infusion  Intravenous Continuous SARAH Acevedo current or ex partner: Not on file        Emotionally abused: Not on file        Physically abused: Not on file        Forced sexual activity: Not on file    Other Topics      Concerns:        Not on file    Social History Narrative      Not on file      A

## 2019-07-17 NOTE — ANESTHESIA POSTPROCEDURE EVALUATION
Patient: Amarilis Gresham    Procedure Summary     Date:  07/17/19 Room / Location:  [facility] ENDOSCOPY 01 / [facility] ENDOSCOPY    Anesthesia Start:  0279 Anesthesia Stop:  1396    Procedures:       COLONOSCOPY (N/A )      ESOPHAGOGASTRODUODENOSCOPY (EGD) (N/A ) Amelia

## 2019-07-18 VITALS
DIASTOLIC BLOOD PRESSURE: 89 MMHG | OXYGEN SATURATION: 100 % | HEIGHT: 63 IN | WEIGHT: 210 LBS | BODY MASS INDEX: 37.21 KG/M2 | RESPIRATION RATE: 20 BRPM | HEART RATE: 58 BPM | SYSTOLIC BLOOD PRESSURE: 137 MMHG

## 2019-07-18 NOTE — TELEPHONE ENCOUNTER
Text Messages    Message # [id number]         2019 07:03p   [EMANI]  ------------ CARBON COPY------------- :  Primary MD:  [facility] Healthsoo  :  1965  Phone#:    ------------------------------------------------------

## 2019-07-22 ENCOUNTER — TELEPHONE (OUTPATIENT)
Dept: GASTROENTEROLOGY | Facility: CLINIC | Age: 54
End: 2019-07-22

## 2019-07-22 NOTE — TELEPHONE ENCOUNTER
Had colonboscopy amnd having sharp abdominal pain. Advised on phone to avoid eating and to return to Er if pain does not subside.

## 2019-07-23 NOTE — TELEPHONE ENCOUNTER
Entered into Epic. Recall EGD in 1 year per Dr. Aaron Sousa for esophagitis, esophageal polyp. Last EGD done 7/17/19, next due 7/17/20. Snapshot updated. Entered into Epic:Recall colon in 3 years per Dr. Aaron Sousa for colon polyps.  Last Colon done 7/17/19, next due 7/

## 2019-07-23 NOTE — TELEPHONE ENCOUNTER
D/w Crissy Rene the EGD/CLN path:    1. 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 2. Esophageal polyp - no malignancy  3. Colon polyps - tubular adenoma x4    We discussed repeating EGD/CLN and she agrees. GI Clinical Staff:   1.  Recall E

## 2019-08-30 RX ORDER — ATENOLOL 50 MG/1
50 TABLET ORAL DAILY
Qty: 90 TABLET | Refills: 0 | Status: SHIPPED | OUTPATIENT
Start: 2019-08-30 | End: 2019-12-17

## 2019-08-30 NOTE — TELEPHONE ENCOUNTER
Refill passed per [facility] protocol.   Hypertensive Medications  Protocol Criteria:  · Appointment scheduled in the past 6 months or in the next 3 months  · BMP or CMP in the past 12 months  · Creatinine result < 2  Recent Outpatient Visits

## 2019-08-30 NOTE — TELEPHONE ENCOUNTER
Pt would like a refill on her atenolol medication. Per pt she only has 4 pills left. Pt did make an appt to see Dr. Cadnece Hdez on 9-13-19 for follow up on her medications. Pt would like to know if the doctor can give her a refill.   Pharmacy: [facility]

## 2019-10-15 ENCOUNTER — OFFICE VISIT (OUTPATIENT)
Dept: INTERNAL MEDICINE CLINIC | Facility: CLINIC | Age: 54
End: 2019-10-15
Payer: MEDICAID

## 2019-10-15 VITALS
RESPIRATION RATE: 18 BRPM | WEIGHT: 210 LBS | BODY MASS INDEX: 35.85 KG/M2 | DIASTOLIC BLOOD PRESSURE: 83 MMHG | TEMPERATURE: 98 F | HEART RATE: 71 BPM | HEIGHT: 64 IN | SYSTOLIC BLOOD PRESSURE: 137 MMHG

## 2019-10-15 DIAGNOSIS — M12.9 ARTHROPATHY: ICD-10-CM

## 2019-10-15 DIAGNOSIS — R20.0 NUMBNESS AND TINGLING: ICD-10-CM

## 2019-10-15 DIAGNOSIS — Z00.00 PHYSICAL EXAM, ANNUAL: Primary | ICD-10-CM

## 2019-10-15 DIAGNOSIS — M79.10 MYALGIA: ICD-10-CM

## 2019-10-15 DIAGNOSIS — M25.552 PAIN OF BOTH HIP JOINTS: ICD-10-CM

## 2019-10-15 DIAGNOSIS — R20.2 NUMBNESS AND TINGLING: ICD-10-CM

## 2019-10-15 DIAGNOSIS — E55.9 VITAMIN D DEFICIENCY: ICD-10-CM

## 2019-10-15 DIAGNOSIS — M25.551 PAIN OF BOTH HIP JOINTS: ICD-10-CM

## 2019-10-15 DIAGNOSIS — M54.16 LUMBAR RADICULOPATHY: ICD-10-CM

## 2019-10-15 PROCEDURE — 99396 PREV VISIT EST AGE 40-64: CPT | Performed by: INTERNAL MEDICINE

## 2019-10-15 RX ORDER — CYCLOBENZAPRINE HCL 10 MG
10 TABLET ORAL NIGHTLY
Qty: 30 TABLET | Refills: 0 | Status: SHIPPED | OUTPATIENT
Start: 2019-10-15 | End: 2020-08-03

## 2019-10-15 NOTE — PROGRESS NOTES
HPI:    Patient ID: Jovanni Santos is a 62year old female.   Presents for follow-up of multiple concerns,    HPI  Patient is dealing with chronic pain in the upper posterior chest, states that he has it for years, couple years ago physical therapy for 2 HCl 10 MG Oral Tab, Take  1-2 tab po  At bed time, Disp: 60 tablet, Rfl: 3  hydrochlorothiazide 25 MG Oral Tab, Take 1 tablet (25 mg total) by mouth daily. , Disp: 90 tablet, Rfl: 1  Aspirin 81 MG Oral Tab, Take 81 mg by mouth daily. , Disp: , Rfl:       All diet, avoid late meals  Arthropathy will check inflammatory markers, x-rays of the hips and lumbar spine  Myalgia etiology not clear check CK, aldolase level may need to see rheumatologist  Vitamin d deficiency check vitamin D level treat if necessary  Jack Gonzalez

## 2019-12-02 ENCOUNTER — LAB ENCOUNTER (OUTPATIENT)
Dept: LAB | Facility: HOSPITAL | Age: 54
End: 2019-12-02
Attending: INTERNAL MEDICINE
Payer: MEDICAID

## 2019-12-02 ENCOUNTER — HOSPITAL ENCOUNTER (OUTPATIENT)
Dept: GENERAL RADIOLOGY | Facility: HOSPITAL | Age: 54
Discharge: HOME | End: 2019-12-02
Attending: INTERNAL MEDICINE
Payer: MEDICAID

## 2019-12-02 DIAGNOSIS — M25.552 PAIN OF BOTH HIP JOINTS: ICD-10-CM

## 2019-12-02 DIAGNOSIS — M25.551 PAIN OF BOTH HIP JOINTS: ICD-10-CM

## 2019-12-02 DIAGNOSIS — M12.9 ARTHROPATHY: ICD-10-CM

## 2019-12-02 DIAGNOSIS — E55.9 VITAMIN D DEFICIENCY: ICD-10-CM

## 2019-12-02 DIAGNOSIS — M54.16 LUMBAR RADICULOPATHY: ICD-10-CM

## 2019-12-02 DIAGNOSIS — M79.10 MYALGIA: ICD-10-CM

## 2019-12-02 DIAGNOSIS — Z00.00 PHYSICAL EXAM, ANNUAL: ICD-10-CM

## 2019-12-02 DIAGNOSIS — R89.9 ABNORMAL LABORATORY TEST RESULT: ICD-10-CM

## 2019-12-02 PROCEDURE — 80053 COMPREHEN METABOLIC PANEL: CPT

## 2019-12-02 PROCEDURE — 82306 VITAMIN D 25 HYDROXY: CPT

## 2019-12-02 PROCEDURE — 86140 C-REACTIVE PROTEIN: CPT

## 2019-12-02 PROCEDURE — 85025 COMPLETE CBC W/AUTO DIFF WBC: CPT

## 2019-12-02 PROCEDURE — 80061 LIPID PANEL: CPT

## 2019-12-02 PROCEDURE — 85652 RBC SED RATE AUTOMATED: CPT

## 2019-12-02 PROCEDURE — 82550 ASSAY OF CK (CPK): CPT

## 2019-12-02 PROCEDURE — 86431 RHEUMATOID FACTOR QUANT: CPT

## 2019-12-02 PROCEDURE — 72110 X-RAY EXAM L-2 SPINE 4/>VWS: CPT | Performed by: INTERNAL MEDICINE

## 2019-12-02 PROCEDURE — 85060 BLOOD SMEAR INTERPRETATION: CPT

## 2019-12-02 PROCEDURE — 73522 X-RAY EXAM HIPS BI 3-4 VIEWS: CPT | Performed by: INTERNAL MEDICINE

## 2019-12-02 PROCEDURE — 83735 ASSAY OF MAGNESIUM: CPT

## 2019-12-02 PROCEDURE — 36415 COLL VENOUS BLD VENIPUNCTURE: CPT

## 2019-12-02 PROCEDURE — 84443 ASSAY THYROID STIM HORMONE: CPT

## 2019-12-17 RX ORDER — ATENOLOL 50 MG/1
TABLET ORAL
Qty: 90 TABLET | Refills: 1 | Status: SHIPPED | OUTPATIENT
Start: 2019-12-17 | End: 2020-06-23

## 2019-12-17 RX ORDER — HYDROCHLOROTHIAZIDE 25 MG/1
TABLET ORAL
Qty: 90 TABLET | Refills: 1 | Status: SHIPPED | OUTPATIENT
Start: 2019-12-17 | End: 2020-07-28

## 2019-12-18 NOTE — TELEPHONE ENCOUNTER
Refill passed per [facility] protocol.   Hypertensive Medications  Protocol Criteria:  · Appointment scheduled in the past 6 months or in the next 3 months  · BMP or CMP in the past 12 months  · Creatinine result < 2  Recent Outpatient Visits

## 2020-01-22 ENCOUNTER — APPOINTMENT (OUTPATIENT)
Dept: LAB | Facility: HOSPITAL | Age: 55
End: 2020-01-22
Attending: Other
Payer: MEDICAID

## 2020-01-22 ENCOUNTER — TELEPHONE (OUTPATIENT)
Dept: NEUROLOGY | Facility: CLINIC | Age: 55
End: 2020-01-22

## 2020-01-22 ENCOUNTER — OFFICE VISIT (OUTPATIENT)
Dept: NEUROLOGY | Facility: CLINIC | Age: 55
End: 2020-01-22
Payer: MEDICAID

## 2020-01-22 VITALS
SYSTOLIC BLOOD PRESSURE: 150 MMHG | HEART RATE: 60 BPM | DIASTOLIC BLOOD PRESSURE: 100 MMHG | BODY MASS INDEX: 35.85 KG/M2 | HEIGHT: 64 IN | WEIGHT: 210 LBS

## 2020-01-22 DIAGNOSIS — R25.2 CRAMPS, EXTREMITY: ICD-10-CM

## 2020-01-22 DIAGNOSIS — G25.81 RLS (RESTLESS LEGS SYNDROME): ICD-10-CM

## 2020-01-22 DIAGNOSIS — R20.2 PARESTHESIAS: ICD-10-CM

## 2020-01-22 DIAGNOSIS — R20.2 PARESTHESIAS: Primary | ICD-10-CM

## 2020-01-22 DIAGNOSIS — R89.9 ABNORMAL LABORATORY TEST RESULT: ICD-10-CM

## 2020-01-22 LAB
ALBUMIN SERPL-MCNC: 3.4 G/DL (ref 3.4–5)
ALBUMIN/GLOB SERPL: 0.8 {RATIO} (ref 1–2)
ALP LIVER SERPL-CCNC: 118 U/L (ref 46–118)
ALT SERPL-CCNC: 20 U/L (ref 13–56)
ANION GAP SERPL CALC-SCNC: 4 MMOL/L (ref 0–18)
AST SERPL-CCNC: 14 U/L (ref 15–37)
BILIRUB DIRECT SERPL-MCNC: <0.1 MG/DL (ref 0–0.2)
BILIRUB SERPL-MCNC: 0.3 MG/DL (ref 0.1–2)
BUN BLD-MCNC: 16 MG/DL (ref 7–18)
BUN/CREAT SERPL: 15.7 (ref 10–20)
CALCIUM BLD-MCNC: 9.4 MG/DL (ref 8.5–10.1)
CHLORIDE SERPL-SCNC: 107 MMOL/L (ref 98–112)
CK SERPL-CCNC: 200 U/L (ref 26–192)
CO2 SERPL-SCNC: 30 MMOL/L (ref 21–32)
CREAT BLD-MCNC: 1.02 MG/DL (ref 0.55–1.02)
DEPRECATED HBV CORE AB SER IA-ACNC: 353.9 NG/ML (ref 18–340)
GLOBULIN PLAS-MCNC: 4.3 G/DL (ref 2.8–4.4)
GLUCOSE BLD-MCNC: 81 MG/DL (ref 70–99)
IRON SATURATION: 14 % (ref 15–50)
IRON SERPL-MCNC: 48 UG/DL (ref 50–170)
LDH SERPL L TO P-CCNC: 172 U/L (ref 84–246)
M PROTEIN MFR SERPL ELPH: 7.7 G/DL (ref 6.4–8.2)
OSMOLALITY SERPL CALC.SUM OF ELEC: 292 MOSM/KG (ref 275–295)
PATIENT FASTING Y/N/NP: NO
POTASSIUM SERPL-SCNC: 3.9 MMOL/L (ref 3.5–5.1)
SODIUM SERPL-SCNC: 141 MMOL/L (ref 136–145)
TOTAL IRON BINDING CAPACITY: 353 UG/DL (ref 240–450)
TRANSFERRIN SERPL-MCNC: 237 MG/DL (ref 200–360)
VIT B12 SERPL-MCNC: 1008 PG/ML (ref 193–986)

## 2020-01-22 PROCEDURE — 82607 VITAMIN B-12: CPT | Performed by: OTHER

## 2020-01-22 PROCEDURE — 86038 ANTINUCLEAR ANTIBODIES: CPT

## 2020-01-22 PROCEDURE — 82085 ASSAY OF ALDOLASE: CPT

## 2020-01-22 PROCEDURE — 82248 BILIRUBIN DIRECT: CPT | Performed by: OTHER

## 2020-01-22 PROCEDURE — 80053 COMPREHEN METABOLIC PANEL: CPT

## 2020-01-22 PROCEDURE — 86334 IMMUNOFIX E-PHORESIS SERUM: CPT

## 2020-01-22 PROCEDURE — 99204 OFFICE O/P NEW MOD 45 MIN: CPT | Performed by: OTHER

## 2020-01-22 PROCEDURE — 82550 ASSAY OF CK (CPK): CPT

## 2020-01-22 PROCEDURE — 36415 COLL VENOUS BLD VENIPUNCTURE: CPT

## 2020-01-22 PROCEDURE — 82728 ASSAY OF FERRITIN: CPT

## 2020-01-22 PROCEDURE — 84466 ASSAY OF TRANSFERRIN: CPT

## 2020-01-22 PROCEDURE — 83540 ASSAY OF IRON: CPT

## 2020-01-22 PROCEDURE — 83615 LACTATE (LD) (LDH) ENZYME: CPT

## 2020-01-22 RX ORDER — GABAPENTIN 100 MG/1
CAPSULE ORAL
Qty: 90 CAPSULE | Refills: 5 | Status: SHIPPED | OUTPATIENT
Start: 2020-01-22 | End: 2020-08-03

## 2020-01-22 NOTE — TELEPHONE ENCOUNTER
----- Message from Korey Vickers MD sent at 1/22/2020  3:04 PM CST -----  Please let the patient know that results of these particular lab tests so far were normal.     Except of slightly low levels liver in which we expected and could potentially c

## 2020-01-22 NOTE — PROGRESS NOTES
Neurology Initial Visit     Referred By: Dr. Frazier ref. provider found    Chief Complaint: Patient presents with:  Numbness: Patient presents today c/o numbness, tingling, and muscle cramps.  She states that the numbness and tingling is in hands and feet and LOCALIZATION WIRE 1 SITE RIGHT (CPT=19281)  2005   • NEEDLE BIOPSY RIGHT      in her 19's       Social history:    Smoking status: Never Smoker   Smokeless tobacco: Never Used       Alcohol use Yes   Comment:  Occ       Drug use: Unknown       Family Histor oriented x3.   Normal attention span and concentration  Thought process intact  Memory intact- recent and remote  Mood intact  Fund of knowledge appropriate for education and age    Language intact including: comprehension, naming, repetition, vocabulary redevelopment of carpal tunnel syndrome again. Therefore EMG of both lower extremities will be done later  MRI of the cervical spine will be done since it could be potentially myelopathy as well.   Additional blood work will be done including iron levels t medical attention immediately if symptoms worsen. Patient verbalized understanding of information given. All questions were answered. All side effects of drugs were discussed. Return to clinic in: Return for EMG.     Agapito Hammer MD

## 2020-01-23 ENCOUNTER — TELEPHONE (OUTPATIENT)
Dept: NEUROLOGY | Facility: CLINIC | Age: 55
End: 2020-01-23

## 2020-01-23 LAB
ALDOLASE, SERUM: 3.5 U/L
NUCLEAR IGG TITR SER IF: NEGATIVE {TITER}

## 2020-01-23 NOTE — TELEPHONE ENCOUNTER
----- Message from Makeda Navarro MD sent at 1/23/2020  1:34 PM CST -----  Please let the patient know that results of these particular lab tests so far were normal.    Thank you

## 2020-01-23 NOTE — TELEPHONE ENCOUNTER
Asif for [facility] BS Online for authorization of approval for MRI C-Spine wo cpt code 15645. Approval was given with Authorization Number: [id number] effective 01/23/20 to 03/08/20. Will call Pt. To inform.  L/m advising of approval. Can proceed with schedulin

## 2020-02-06 ENCOUNTER — OFFICE VISIT (OUTPATIENT)
Dept: INTERNAL MEDICINE CLINIC | Facility: CLINIC | Age: 55
End: 2020-02-06
Payer: MEDICAID

## 2020-02-06 VITALS
TEMPERATURE: 98 F | WEIGHT: 210 LBS | SYSTOLIC BLOOD PRESSURE: 127 MMHG | BODY MASS INDEX: 35.85 KG/M2 | DIASTOLIC BLOOD PRESSURE: 80 MMHG | OXYGEN SATURATION: 98 % | HEART RATE: 64 BPM | HEIGHT: 64 IN

## 2020-02-06 DIAGNOSIS — R05.9 COUGH: Primary | ICD-10-CM

## 2020-02-06 PROCEDURE — 99213 OFFICE O/P EST LOW 20 MIN: CPT | Performed by: INTERNAL MEDICINE

## 2020-02-06 RX ORDER — LEVOCETIRIZINE DIHYDROCHLORIDE 5 MG/1
5 TABLET, FILM COATED ORAL EVERY EVENING
Qty: 7 TABLET | Refills: 0 | Status: SHIPPED | OUTPATIENT
Start: 2020-02-06 | End: 2021-05-16

## 2020-02-06 RX ORDER — PREDNISONE 20 MG/1
TABLET ORAL
Qty: 7 TABLET | Refills: 0 | Status: SHIPPED | OUTPATIENT
Start: 2020-02-06 | End: 2020-02-18

## 2020-02-06 NOTE — PROGRESS NOTES
Keon Camacho is a 62year old female. Patient presents with:  Cough: unproductive cough, runny nose    HPI:   62year-old pleasant lady here for evaluation of cough. She reports that 2 weeks back she had flu that she got from her son.   Her flu symptoms by Colonel Rashaun MD at [address] [place] [address] ENDOSCOPY   • CYST ASPIRATION RIGHT  2014   • ESOPHAGOGASTRODUODENOSCOPY (EGD) N/A 7/17/2019    Performed by Colonel Rashaun MD at [address] [place] [address] ENDOSCOPY   • HYSTERECTOMY      38   • DEBRA LOCALIZATION WIRE 1 SITE RIGHT (CPT=19281)  200 Normocephalic. Mouth/Throat: No oropharyngeal exudate or posterior oropharyngeal erythema. Neck: Neck supple. Cardiovascular: Normal rate and regular rhythm. No murmur heard.   Pulmonary/Chest: Effort normal and breath sounds normal. No respiratory

## 2020-02-06 NOTE — PATIENT INSTRUCTIONS
1. Cough  -most likely a sequelae of viral bronchitis. As it is interfering with her normal activity, I will give her a short course of prednisone. I also encouraged her to take antihistamines like Xyzal once at night.   Hopefully her symptoms will get be

## 2020-02-17 ENCOUNTER — TELEPHONE (OUTPATIENT)
Dept: INTERNAL MEDICINE CLINIC | Facility: CLINIC | Age: 55
End: 2020-02-17

## 2020-02-18 ENCOUNTER — OFFICE VISIT (OUTPATIENT)
Dept: INTERNAL MEDICINE CLINIC | Facility: CLINIC | Age: 55
End: 2020-02-18
Payer: MEDICAID

## 2020-02-18 ENCOUNTER — HOSPITAL ENCOUNTER (OUTPATIENT)
Dept: GENERAL RADIOLOGY | Facility: HOSPITAL | Age: 55
Discharge: HOME | End: 2020-02-18
Attending: INTERNAL MEDICINE
Payer: MEDICAID

## 2020-02-18 VITALS
HEIGHT: 64 IN | HEART RATE: 66 BPM | OXYGEN SATURATION: 98 % | DIASTOLIC BLOOD PRESSURE: 79 MMHG | BODY MASS INDEX: 35.85 KG/M2 | SYSTOLIC BLOOD PRESSURE: 135 MMHG | WEIGHT: 210 LBS

## 2020-02-18 DIAGNOSIS — R05.9 COUGH: ICD-10-CM

## 2020-02-18 DIAGNOSIS — R05.9 COUGH: Primary | ICD-10-CM

## 2020-02-18 PROCEDURE — 71046 X-RAY EXAM CHEST 2 VIEWS: CPT | Performed by: INTERNAL MEDICINE

## 2020-02-18 PROCEDURE — 99213 OFFICE O/P EST LOW 20 MIN: CPT | Performed by: INTERNAL MEDICINE

## 2020-02-18 RX ORDER — BENZONATATE 100 MG/1
100 CAPSULE ORAL 3 TIMES DAILY PRN
Qty: 30 CAPSULE | Refills: 0 | Status: SHIPPED | OUTPATIENT
Start: 2020-02-18 | End: 2020-08-03

## 2020-02-18 NOTE — PROGRESS NOTES
Ashley Hendrickson is a 62year old female. Patient presents with:  Cough: still with unproductive cough    HPI:   41-year-old female with a past medical history of hypertension here for evaluation of dry cough.   I saw her almost 2 weeks back for similar comp Surgical History:   Procedure Laterality Date   •      • COLONOSCOPY N/A 2019    Performed by Jamey Ledbetter MD at [address] RIGHT  2014   • ESOPHAGOGASTRODUODENOSCOPY (EGD) N/A 2019    Performed by Dm Durán Exam    Constitutional: She is oriented to person, place, and time. She appears well-nourished. No distress. HENT:   Head: Normocephalic. Right Ear: Tympanic membrane is not erythematous. Left Ear: Tympanic membrane is not erythematous.    Mouth/Throa

## 2020-02-18 NOTE — PATIENT INSTRUCTIONS
ASSESSMENT AND PLAN:   1. Cough  Most likely a sequelae of viral bronchitis. She had flulike symptoms in February 1 week then developed cough. We have tried prednisone for 5 days. She is also taking Xyzal and dextromethorphan with only minimal relief.

## 2020-02-18 NOTE — TELEPHONE ENCOUNTER
Dr. Sheree Helton, patient is requesting a mammogram order. Last mammogram was completed 02/22/2019. Please advise on order.

## 2020-03-05 ENCOUNTER — HOSPITAL ENCOUNTER (OUTPATIENT)
Dept: MAMMOGRAPHY | Facility: HOSPITAL | Age: 55
Discharge: HOME | End: 2020-03-05
Attending: INTERNAL MEDICINE
Payer: MEDICAID

## 2020-03-05 DIAGNOSIS — Z12.31 BREAST CANCER SCREENING BY MAMMOGRAM: ICD-10-CM

## 2020-03-05 PROCEDURE — 77063 BREAST TOMOSYNTHESIS BI: CPT | Performed by: INTERNAL MEDICINE

## 2020-03-05 PROCEDURE — 77067 SCR MAMMO BI INCL CAD: CPT | Performed by: INTERNAL MEDICINE

## 2020-03-11 ENCOUNTER — TELEPHONE (OUTPATIENT)
Dept: INTERNAL MEDICINE CLINIC | Facility: CLINIC | Age: 55
End: 2020-03-11

## 2020-03-11 NOTE — TELEPHONE ENCOUNTER
Pt stated that she saw Betzy Martin due to a cough that pt has been having. Pt stated that the cough is better but not completely resolved. Pt will like to see her PCP . Pt request to see her next week as she will not be in this week.   can

## 2020-06-23 RX ORDER — AMITRIPTYLINE HYDROCHLORIDE 10 MG/1
TABLET, FILM COATED ORAL
Qty: 60 TABLET | Refills: 0 | Status: SHIPPED | OUTPATIENT
Start: 2020-06-23 | End: 2020-09-02

## 2020-06-23 RX ORDER — ATENOLOL 50 MG/1
TABLET ORAL
Qty: 90 TABLET | Refills: 0 | Status: SHIPPED | OUTPATIENT
Start: 2020-06-23 | End: 2020-07-28

## 2020-07-02 ENCOUNTER — TELEPHONE (OUTPATIENT)
Dept: GASTROENTEROLOGY | Facility: CLINIC | Age: 55
End: 2020-07-02

## 2020-07-02 NOTE — TELEPHONE ENCOUNTER
----- Message from Lincoln Julian RN sent at 2019 10:38 AM CDT -----  Regardin yr EGD recall  Entered into Epic. Recall EGD in 1 year per Dr. Benito Wilkins for esophagitis, esophageal polyp. Last EGD done 19, next due 20. Snapshot updated.

## 2020-07-14 ENCOUNTER — TELEPHONE (OUTPATIENT)
Dept: GASTROENTEROLOGY | Facility: CLINIC | Age: 55
End: 2020-07-14

## 2020-07-14 ENCOUNTER — OFFICE VISIT (OUTPATIENT)
Dept: GASTROENTEROLOGY | Facility: CLINIC | Age: 55
End: 2020-07-14
Payer: MEDICAID

## 2020-07-14 ENCOUNTER — LAB ENCOUNTER (OUTPATIENT)
Dept: LAB | Facility: HOSPITAL | Age: 55
End: 2020-07-14
Attending: NURSE PRACTITIONER
Payer: MEDICAID

## 2020-07-14 VITALS
SYSTOLIC BLOOD PRESSURE: 144 MMHG | WEIGHT: 213 LBS | DIASTOLIC BLOOD PRESSURE: 89 MMHG | HEART RATE: 57 BPM | BODY MASS INDEX: 37.74 KG/M2 | HEIGHT: 63 IN

## 2020-07-14 DIAGNOSIS — K22.81 ESOPHAGEAL POLYP: ICD-10-CM

## 2020-07-14 DIAGNOSIS — E61.1 IRON DEFICIENCY: ICD-10-CM

## 2020-07-14 DIAGNOSIS — K62.5 BRBPR (BRIGHT RED BLOOD PER RECTUM): ICD-10-CM

## 2020-07-14 DIAGNOSIS — D12.6 ADENOMATOUS POLYP OF COLON, UNSPECIFIED PART OF COLON: ICD-10-CM

## 2020-07-14 DIAGNOSIS — K63.9 LESION OF COLON: ICD-10-CM

## 2020-07-14 DIAGNOSIS — K64.8 INTERNAL HEMORRHOIDS: ICD-10-CM

## 2020-07-14 DIAGNOSIS — K63.5 POLYP OF COLON, UNSPECIFIED PART OF COLON, UNSPECIFIED TYPE: ICD-10-CM

## 2020-07-14 DIAGNOSIS — K21.9 GASTROESOPHAGEAL REFLUX DISEASE, ESOPHAGITIS PRESENCE NOT SPECIFIED: Primary | ICD-10-CM

## 2020-07-14 DIAGNOSIS — K21.00 GASTROESOPHAGEAL REFLUX DISEASE WITH ESOPHAGITIS: Primary | ICD-10-CM

## 2020-07-14 LAB
BASOPHILS # BLD AUTO: 0.03 X10(3) UL (ref 0–0.2)
BASOPHILS NFR BLD AUTO: 0.5 %
DEPRECATED RDW RBC AUTO: 39.8 FL (ref 35.1–46.3)
EOSINOPHIL # BLD AUTO: 0.1 X10(3) UL (ref 0–0.7)
EOSINOPHIL NFR BLD AUTO: 1.7 %
ERYTHROCYTE [DISTWIDTH] IN BLOOD BY AUTOMATED COUNT: 13.8 % (ref 11–15)
HCT VFR BLD AUTO: 44.4 % (ref 35–48)
HGB BLD-MCNC: 13.9 G/DL (ref 12–16)
IMM GRANULOCYTES # BLD AUTO: 0.01 X10(3) UL (ref 0–1)
IMM GRANULOCYTES NFR BLD: 0.2 %
LYMPHOCYTES # BLD AUTO: 2.4 X10(3) UL (ref 1–4)
LYMPHOCYTES NFR BLD AUTO: 39.9 %
MCH RBC QN AUTO: 25.2 PG (ref 26–34)
MCHC RBC AUTO-ENTMCNC: 31.3 G/DL (ref 31–37)
MCV RBC AUTO: 80.4 FL (ref 80–100)
MONOCYTES # BLD AUTO: 0.6 X10(3) UL (ref 0.1–1)
MONOCYTES NFR BLD AUTO: 10 %
NEUTROPHILS # BLD AUTO: 2.87 X10 (3) UL (ref 1.5–7.7)
NEUTROPHILS # BLD AUTO: 2.87 X10(3) UL (ref 1.5–7.7)
NEUTROPHILS NFR BLD AUTO: 47.7 %
PLATELET # BLD AUTO: 249 10(3)UL (ref 150–450)
RBC # BLD AUTO: 5.52 X10(6)UL (ref 3.8–5.3)
WBC # BLD AUTO: 6 X10(3) UL (ref 4–11)

## 2020-07-14 PROCEDURE — 99214 OFFICE O/P EST MOD 30 MIN: CPT | Performed by: NURSE PRACTITIONER

## 2020-07-14 PROCEDURE — 85025 COMPLETE CBC W/AUTO DIFF WBC: CPT

## 2020-07-14 PROCEDURE — 36415 COLL VENOUS BLD VENIPUNCTURE: CPT

## 2020-07-14 RX ORDER — OMEPRAZOLE 40 MG/1
40 CAPSULE, DELAYED RELEASE ORAL DAILY
Qty: 30 CAPSULE | Refills: 11 | Status: SHIPPED | OUTPATIENT
Start: 2020-07-14 | End: 2020-09-02

## 2020-07-14 RX ORDER — OMEPRAZOLE 20 MG/1
20 CAPSULE, DELAYED RELEASE ORAL
Qty: 28 CAPSULE | Refills: 0 | Status: SHIPPED | OUTPATIENT
Start: 2020-07-14 | End: 2020-07-14 | Stop reason: CLARIF

## 2020-07-14 RX ORDER — POLYETHYLENE GLYCOL 3350, SODIUM CHLORIDE, SODIUM BICARBONATE, POTASSIUM CHLORIDE 420; 11.2; 5.72; 1.48 G/4L; G/4L; G/4L; G/4L
POWDER, FOR SOLUTION ORAL
Qty: 1 BOTTLE | Refills: 0 | Status: ON HOLD | OUTPATIENT
Start: 2020-07-14 | End: 2020-09-01 | Stop reason: ALTCHOICE

## 2020-07-14 NOTE — PROGRESS NOTES
[address] [facility] [address] Gastroenterology                                                                                                               Reason for consult: f Encounters:  07/14/20 : 213 lb (96.6 kg)  02/18/20 : 210 lb (95.3 kg)  02/06/20 : 210 lb (95.3 kg)  01/22/20 : 210 lb (95.3 kg)  10/15/19 : 210 lb (95.3 kg)  07/15/19 : 210 lb (95.3 kg)       History, Medications, Allergies, ROS:      Past Medical History: MG Oral Tab TAKE 1 TABLET BY MOUTH ONCE DAILY 90 tablet 1   • ergocalciferol (DRISDOL) 1.25 MG (51099 UT) Oral Cap Take 1 capsule (50,000 Units total) by mouth once a week.  4 capsule 5   • Cyclobenzaprine HCl 10 MG Oral Tab Take 1 tablet (10 mg total) by m esophageal polyp was seen and removed by biopsy polypectomy. Persistent oozing from polypectomy site controlled with a single endoclip placement.     The esophageal mucosa appeared otherwise normal. There was no evidence of esophagitis, stricture or endosco inflammation. 6. NAOMIE: normal rectal tone, no masses palpated.      Impression:  · EGD shows a duodenal polyp (probably pancreatic rest) and esophageal small polyp (removed).    · CLN shows Polyp(s) of colon, diverticulosis of the colon, internal hemorrhoi intestinal metaplasia, dysplasia, or malignancy is identified. · Diff Quik stain (with appropriate control reactivity) is negative for H pylori microorganisms.     D. Ascending colon polyp:   · Tubular adenoma.     E.  Sigmoid colon polyp:   · Fragments of MCHC  31.0 - 37.0 g/dL 30.8Low     RDW-SD  35.1 - 46.3 fL 38.5    RDW  11.0 - 15.0 % 13.7    PLT  150.0 - 450.0 10(3)uL 227.0    Neutrophil Absolute Prelim  1.50 - 7.70 x10 (3) uL 2.11    Neutrophil Absolute  1.50 - 7.70 x10(3) uL 2.11    Lymphocyte Abso due for repeat egd. -omeprazole 40 mg once daily  -gerd diet modification  -cln/egd w/ mac w/ dr. Soo Ravi    Orders This Visit:  No orders of the defined types were placed in this encounter.       Meds This Visit:  Requested Prescriptions      No prescriptio

## 2020-07-14 NOTE — TELEPHONE ENCOUNTER
Scheduled for:  coloNscopy Alma Rosa Guerrero  Provider Name:  Dr Girard Loge  Date: 9/1/2020  Location:  [facility]  Sedation:  MAC  Time:  10:15AM (pt is aware to arrive at 9:15am)    Prep:  TRILYTE  Meds/Allergies Reconciled?:  Lori reviewed    Diagnosis with codes

## 2020-07-14 NOTE — PATIENT INSTRUCTIONS
-ct to assess lipoma  -labs today to check hemoglobin  -start omeprazole 40 mg once daily x next 8-12 weeks  -reflux diet modification  -miralax once daily for constipation  1.  Schedule colonoscopy/egd with [facility]/ dr. Lia Brewer [Diagnosis: brbpr, h/o colon khari

## 2020-07-21 ENCOUNTER — TELEPHONE (OUTPATIENT)
Dept: INTERNAL MEDICINE CLINIC | Facility: CLINIC | Age: 55
End: 2020-07-21

## 2020-07-21 NOTE — TELEPHONE ENCOUNTER
Patient called, stating she wanted to ask Dr. Xavier Corea if he felt that she would be safe going back to work around kids, because of her bronchial issues in February and high blood pressure. Please advise.

## 2020-07-21 NOTE — TELEPHONE ENCOUNTER
Everybody is at risk for capturing COVID-19 if we do not take any precautions. It is hard for me to say that you are at risk or you are not at risk.   If he have to work, please make sure you use/follow standard precautions that includes face masking, freq

## 2020-07-21 NOTE — TELEPHONE ENCOUNTER
Spoke with pt,  verified  Pt informed of MD recommendation, pt stated understanding. Pt stated she had bronchitis back in Feb and was treated. Pt denies any sx now. Pt will call back with any problem or concerned.

## 2020-07-28 RX ORDER — ATENOLOL 50 MG/1
TABLET ORAL
Qty: 90 TABLET | Refills: 0 | Status: SHIPPED | OUTPATIENT
Start: 2020-07-28 | End: 2020-11-20

## 2020-07-28 RX ORDER — HYDROCHLOROTHIAZIDE 25 MG/1
TABLET ORAL
Qty: 90 TABLET | Refills: 0 | Status: SHIPPED | OUTPATIENT
Start: 2020-07-28 | End: 2020-11-20

## 2020-07-30 ENCOUNTER — TELEPHONE (OUTPATIENT)
Dept: INTERNAL MEDICINE CLINIC | Facility: CLINIC | Age: 55
End: 2020-07-30

## 2020-07-30 ENCOUNTER — NURSE TRIAGE (OUTPATIENT)
Dept: INTERNAL MEDICINE CLINIC | Facility: CLINIC | Age: 55
End: 2020-07-30

## 2020-07-30 NOTE — TELEPHONE ENCOUNTER
Patient states pharmacy advised her she can not get medication until 9/1. They did not give her a reason as to why. Medication was sent to pharmacy on 7/28.  Please advise      ATENOLOL 50 MG Oral Tab

## 2020-07-30 NOTE — TELEPHONE ENCOUNTER
Action Requested: Summary for Provider     []  Critical Lab, Recommendations Needed  [x] Need Additional Advice  []   FYI    []   Need Orders  [] Need Medications Sent to Pharmacy  []  Other     SUMMARY: Patient states since May she had 3 dizzy spells th

## 2020-07-30 NOTE — TELEPHONE ENCOUNTER
Spoke to at [address] patient picked up prescription for 90 tablets 6/24/20 and should have refills left. Insurance will not cover medication until 9/1/20,    Spoke to patient, patient found bottle of Atenolol she picked up on 6/24/20 at home.

## 2020-07-30 NOTE — TELEPHONE ENCOUNTER
Please call patient she needs to be in person not to virtual visit with dizziness she needs exam in the office can bring blood pressure machine can convert her virtual visit today visit in person on Monday

## 2020-07-30 NOTE — TELEPHONE ENCOUNTER
Per Dr. Vangie Byrd visit for Monday was changed to a office visit.  Pt made aware     Future Appointments   Date Time Provider Mike Allen   8/3/2020  4:40 PM Arpit Cardenas MD [facility] [place]   9/1/2020 10:15 AM MELONY, PROCEDURE [facility] None

## 2020-08-03 ENCOUNTER — TELEPHONE (OUTPATIENT)
Dept: INTERNAL MEDICINE CLINIC | Facility: CLINIC | Age: 55
End: 2020-08-03

## 2020-08-03 ENCOUNTER — OFFICE VISIT (OUTPATIENT)
Dept: INTERNAL MEDICINE CLINIC | Facility: CLINIC | Age: 55
End: 2020-08-03
Payer: MEDICAID

## 2020-08-03 VITALS
HEART RATE: 74 BPM | RESPIRATION RATE: 18 BRPM | BODY MASS INDEX: 37 KG/M2 | WEIGHT: 211 LBS | DIASTOLIC BLOOD PRESSURE: 75 MMHG | SYSTOLIC BLOOD PRESSURE: 117 MMHG

## 2020-08-03 DIAGNOSIS — R42 DIZZINESSES: Primary | ICD-10-CM

## 2020-08-03 DIAGNOSIS — I10 ESSENTIAL HYPERTENSION: ICD-10-CM

## 2020-08-03 PROBLEM — R20.2 NUMBNESS AND TINGLING OF BOTH LEGS: Status: ACTIVE | Noted: 2020-08-03

## 2020-08-03 PROBLEM — J06.9 VIRAL UPPER RESPIRATORY INFECTION: Status: RESOLVED | Noted: 2019-04-27 | Resolved: 2020-08-03

## 2020-08-03 PROBLEM — R20.0 NUMBNESS AND TINGLING IN BOTH HANDS: Status: ACTIVE | Noted: 2020-08-03

## 2020-08-03 PROBLEM — R20.2 NUMBNESS AND TINGLING IN BOTH HANDS: Status: ACTIVE | Noted: 2020-08-03

## 2020-08-03 PROBLEM — R20.0 NUMBNESS AND TINGLING OF BOTH LEGS: Status: ACTIVE | Noted: 2020-08-03

## 2020-08-03 PROCEDURE — 3074F SYST BP LT 130 MM HG: CPT | Performed by: INTERNAL MEDICINE

## 2020-08-03 PROCEDURE — 3078F DIAST BP <80 MM HG: CPT | Performed by: INTERNAL MEDICINE

## 2020-08-03 PROCEDURE — 99214 OFFICE O/P EST MOD 30 MIN: CPT | Performed by: INTERNAL MEDICINE

## 2020-08-04 ENCOUNTER — LAB ENCOUNTER (OUTPATIENT)
Dept: LAB | Facility: HOSPITAL | Age: 55
End: 2020-08-04
Attending: INTERNAL MEDICINE
Payer: MEDICAID

## 2020-08-04 DIAGNOSIS — R42 DIZZINESSES: ICD-10-CM

## 2020-08-04 LAB
ALBUMIN SERPL-MCNC: 3.7 G/DL (ref 3.4–5)
ALBUMIN/GLOB SERPL: 0.8 {RATIO} (ref 1–2)
ALP LIVER SERPL-CCNC: 128 U/L (ref 46–118)
ALT SERPL-CCNC: 26 U/L (ref 13–56)
ANION GAP SERPL CALC-SCNC: 5 MMOL/L (ref 0–18)
AST SERPL-CCNC: 26 U/L (ref 15–37)
BASOPHILS # BLD AUTO: 0.02 X10(3) UL (ref 0–0.2)
BASOPHILS NFR BLD AUTO: 0.4 %
BILIRUB SERPL-MCNC: 0.4 MG/DL (ref 0.1–2)
BUN BLD-MCNC: 17 MG/DL (ref 7–18)
BUN/CREAT SERPL: 14.9 (ref 10–20)
CALCIUM BLD-MCNC: 9.7 MG/DL (ref 8.5–10.1)
CHLORIDE SERPL-SCNC: 102 MMOL/L (ref 98–112)
CO2 SERPL-SCNC: 32 MMOL/L (ref 21–32)
CREAT BLD-MCNC: 1.14 MG/DL (ref 0.55–1.02)
DEPRECATED RDW RBC AUTO: 38.6 FL (ref 35.1–46.3)
EOSINOPHIL # BLD AUTO: 0.04 X10(3) UL (ref 0–0.7)
EOSINOPHIL NFR BLD AUTO: 0.8 %
ERYTHROCYTE [DISTWIDTH] IN BLOOD BY AUTOMATED COUNT: 13.6 % (ref 11–15)
GLOBULIN PLAS-MCNC: 4.9 G/DL (ref 2.8–4.4)
GLUCOSE BLD-MCNC: 84 MG/DL (ref 70–99)
HCT VFR BLD AUTO: 45.9 % (ref 35–48)
HGB BLD-MCNC: 14.5 G/DL (ref 12–16)
IMM GRANULOCYTES # BLD AUTO: 0.01 X10(3) UL (ref 0–1)
IMM GRANULOCYTES NFR BLD: 0.2 %
LYMPHOCYTES # BLD AUTO: 2.31 X10(3) UL (ref 1–4)
LYMPHOCYTES NFR BLD AUTO: 44.5 %
M PROTEIN MFR SERPL ELPH: 8.6 G/DL (ref 6.4–8.2)
MCH RBC QN AUTO: 24.8 PG (ref 26–34)
MCHC RBC AUTO-ENTMCNC: 31.6 G/DL (ref 31–37)
MCV RBC AUTO: 78.5 FL (ref 80–100)
MONOCYTES # BLD AUTO: 0.47 X10(3) UL (ref 0.1–1)
MONOCYTES NFR BLD AUTO: 9.1 %
NEUTROPHILS # BLD AUTO: 2.34 X10 (3) UL (ref 1.5–7.7)
NEUTROPHILS # BLD AUTO: 2.34 X10(3) UL (ref 1.5–7.7)
NEUTROPHILS NFR BLD AUTO: 45 %
OSMOLALITY SERPL CALC.SUM OF ELEC: 289 MOSM/KG (ref 275–295)
PATIENT FASTING Y/N/NP: NO
PLATELET # BLD AUTO: 254 10(3)UL (ref 150–450)
POTASSIUM SERPL-SCNC: 3.2 MMOL/L (ref 3.5–5.1)
RBC # BLD AUTO: 5.85 X10(6)UL (ref 3.8–5.3)
SODIUM SERPL-SCNC: 139 MMOL/L (ref 136–145)
TSI SER-ACNC: 1.65 MIU/ML (ref 0.36–3.74)
WBC # BLD AUTO: 5.2 X10(3) UL (ref 4–11)

## 2020-08-04 PROCEDURE — 85025 COMPLETE CBC W/AUTO DIFF WBC: CPT

## 2020-08-04 PROCEDURE — 36415 COLL VENOUS BLD VENIPUNCTURE: CPT

## 2020-08-04 PROCEDURE — 84443 ASSAY THYROID STIM HORMONE: CPT

## 2020-08-04 PROCEDURE — 80053 COMPREHEN METABOLIC PANEL: CPT

## 2020-08-04 NOTE — PROGRESS NOTES
HPI:    Patient ID: Amarilis Gresham is a 60year old female.   Presents for evaluation of dizziness, continuing numbness tingling sensation in arms and legs  HPI  Patient reports that last several weeks she has been bothered by intermittent feeling of ligh daily 90 tablet 0   • HYDROCHLOROTHIAZIDE 25 MG Oral Tab Take 1 tablet by mouth once daily 90 tablet 0   • PEG 3350-KCl-Na Bicarb-NaCl (TRILYTE) 420 g Oral Recon Soln Take prep as directed by gastro office.  May substitute with Trilyte/generic equivalent if ASSESSMENT/PLAN:   Dizzinesses  (primary encounter diagnosis) etiology not clear possible element of dehydration versus neurological disorder, advised patient to proceed with MRI of the cervical spine will check if needs extension.   Essential

## 2020-08-04 NOTE — TELEPHONE ENCOUNTER
Patient needs advice, she had MRI of the cervical spine approved by neurology service,  in March and coronavirus started, does she need extension of reapproval to have this MRI done?   Can that be done

## 2020-08-04 NOTE — TELEPHONE ENCOUNTER
Hello,    Patient will rae to contact ordering doctor's office to have them call [employer] for extension. Thank you, Kaykay Chatterjee Specialist    [facility].

## 2020-08-04 NOTE — TELEPHONE ENCOUNTER
Please see message below. Pt.  MRI  Need extension, please have clinical staff call [place].     Thanks

## 2020-08-06 ENCOUNTER — TELEPHONE (OUTPATIENT)
Dept: INTERNAL MEDICINE CLINIC | Facility: CLINIC | Age: 55
End: 2020-08-06

## 2020-08-06 NOTE — TELEPHONE ENCOUNTER
Patient called in stating that her insurance will not cover medication below. She is asking if there is another medication or generic that can this can be switched to so the insurance would cover. Please advise.        Current Outpatient Medications

## 2020-08-06 NOTE — TELEPHONE ENCOUNTER
Alex Moran 33 to verify message below (and ask for covered alternative), but per voice message--pharmacy is closed and will re-open at 2pm. Staff to try again at a later time.

## 2020-08-06 NOTE — TELEPHONE ENCOUNTER
Alex Moran 33 to verify pt message below. Per pharmacist potassium chloride ER 20 MEQ is covered and they have it ready for pt  (cost= $23).      Left detailed message for patient (YONY on file to do so) regarding pharmacies response that deirdre

## 2020-08-17 ENCOUNTER — TELEPHONE (OUTPATIENT)
Dept: GASTROENTEROLOGY | Facility: CLINIC | Age: 55
End: 2020-08-17

## 2020-08-29 ENCOUNTER — LAB ENCOUNTER (OUTPATIENT)
Dept: LAB | Age: 55
End: 2020-08-29
Attending: INTERNAL MEDICINE
Payer: MEDICAID

## 2020-08-29 ENCOUNTER — TELEPHONE (OUTPATIENT)
Dept: GASTROENTEROLOGY | Facility: CLINIC | Age: 55
End: 2020-08-29

## 2020-08-29 ENCOUNTER — HOSPITAL ENCOUNTER (OUTPATIENT)
Dept: CT IMAGING | Facility: HOSPITAL | Age: 55
Discharge: HOME | End: 2020-08-29
Attending: NURSE PRACTITIONER
Payer: MEDICAID

## 2020-08-29 DIAGNOSIS — Z01.818 PRE-OP TESTING: ICD-10-CM

## 2020-08-29 DIAGNOSIS — E87.6 HYPOKALEMIA: ICD-10-CM

## 2020-08-29 DIAGNOSIS — R77.8 ELEVATED TOTAL PROTEIN: ICD-10-CM

## 2020-08-29 DIAGNOSIS — K63.9 LESION OF COLON: ICD-10-CM

## 2020-08-29 LAB
ALBUMIN SERPL-MCNC: 3.5 G/DL (ref 3.4–5)
ALBUMIN/GLOB SERPL: 0.8 {RATIO} (ref 1–2)
ALP LIVER SERPL-CCNC: 116 U/L (ref 46–118)
ALT SERPL-CCNC: 19 U/L (ref 13–56)
ANION GAP SERPL CALC-SCNC: 8 MMOL/L (ref 0–18)
AST SERPL-CCNC: 16 U/L (ref 15–37)
BILIRUB SERPL-MCNC: 0.4 MG/DL (ref 0.1–2)
BUN BLD-MCNC: 24 MG/DL (ref 7–18)
BUN/CREAT SERPL: 21.2 (ref 10–20)
CALCIUM BLD-MCNC: 9.7 MG/DL (ref 8.5–10.1)
CHLORIDE SERPL-SCNC: 101 MMOL/L (ref 98–112)
CO2 SERPL-SCNC: 28 MMOL/L (ref 21–32)
CREAT BLD-MCNC: 1.13 MG/DL (ref 0.55–1.02)
GLOBULIN PLAS-MCNC: 4.6 G/DL (ref 2.8–4.4)
GLUCOSE BLD-MCNC: 90 MG/DL (ref 70–99)
IGA SERPL-MCNC: 398 MG/DL (ref 70–312)
IGM SERPL-MCNC: 46.5 MG/DL (ref 43–279)
IMMUNOGLOBULIN PNL SER-MCNC: 1280 MG/DL (ref 791–1643)
M PROTEIN MFR SERPL ELPH: 8.1 G/DL (ref 6.4–8.2)
OSMOLALITY SERPL CALC.SUM OF ELEC: 288 MOSM/KG (ref 275–295)
PATIENT FASTING Y/N/NP: YES
POTASSIUM SERPL-SCNC: 3.4 MMOL/L (ref 3.5–5.1)
SODIUM SERPL-SCNC: 137 MMOL/L (ref 136–145)

## 2020-08-29 PROCEDURE — 82784 ASSAY IGA/IGD/IGG/IGM EACH: CPT

## 2020-08-29 PROCEDURE — 83883 ASSAY NEPHELOMETRY NOT SPEC: CPT

## 2020-08-29 PROCEDURE — 36415 COLL VENOUS BLD VENIPUNCTURE: CPT

## 2020-08-29 PROCEDURE — 74177 CT ABD & PELVIS W/CONTRAST: CPT | Performed by: NURSE PRACTITIONER

## 2020-08-29 PROCEDURE — 80053 COMPREHEN METABOLIC PANEL: CPT

## 2020-08-29 PROCEDURE — 84165 PROTEIN E-PHORESIS SERUM: CPT

## 2020-08-29 NOTE — TELEPHONE ENCOUNTER
Patient called, didn't have bowel prep instructions. I have asked her to do single dose Trilyte instead of split dose.  We discussed NPO times and arrival times for procedure onTuesady (EGD/CLN)      GI Clinical Staff:   Please send Single dose Trilyte inst

## 2020-08-30 LAB — SARS-COV-2 RNA RESP QL NAA+PROBE: NOT DETECTED

## 2020-08-31 ENCOUNTER — TELEPHONE (OUTPATIENT)
Dept: GASTROENTEROLOGY | Facility: CLINIC | Age: 55
End: 2020-08-31

## 2020-08-31 LAB
KAPPA FREE LIGHT CHAIN: 1.66 MG/DL (ref 0.33–1.94)
KAPPA/LAMBDA FLC RATIO: 1.58 (ref 0.26–1.65)
LAMBDA FREE LIGHT CHAIN: 1.05 MG/DL (ref 0.57–2.63)

## 2020-08-31 NOTE — TELEPHONE ENCOUNTER
Ct a/p 8/29/20 results reviewed with pt.    1. Hepatomegaly-normal lfts 8/29/20. Nml plt 8/4/20. CTM    2.  In the ascending colon, there is circumferential luminal narrowing with wall thickening-suspect is lesion seen on 2019 colon thought to likely be li

## 2020-09-01 ENCOUNTER — ANESTHESIA EVENT (OUTPATIENT)
Dept: ENDOSCOPY | Facility: HOSPITAL | Age: 55
End: 2020-09-01
Payer: MEDICAID

## 2020-09-01 ENCOUNTER — TELEPHONE (OUTPATIENT)
Dept: GASTROENTEROLOGY | Facility: CLINIC | Age: 55
End: 2020-09-01

## 2020-09-01 ENCOUNTER — ANESTHESIA (OUTPATIENT)
Dept: ENDOSCOPY | Facility: HOSPITAL | Age: 55
End: 2020-09-01
Payer: MEDICAID

## 2020-09-01 ENCOUNTER — HOSPITAL ENCOUNTER (OUTPATIENT)
Facility: HOSPITAL | Age: 55
Setting detail: HOSPITAL OUTPATIENT SURGERY
Discharge: HOME | End: 2020-09-01
Attending: INTERNAL MEDICINE | Admitting: INTERNAL MEDICINE
Payer: MEDICAID

## 2020-09-01 DIAGNOSIS — K63.5 POLYP OF COLON, UNSPECIFIED PART OF COLON, UNSPECIFIED TYPE: ICD-10-CM

## 2020-09-01 DIAGNOSIS — Q43.8 TORTUOUS COLON: Primary | ICD-10-CM

## 2020-09-01 DIAGNOSIS — Z53.9 PROCEDURE NOT CARRIED OUT: ICD-10-CM

## 2020-09-01 DIAGNOSIS — K62.5 BRBPR (BRIGHT RED BLOOD PER RECTUM): ICD-10-CM

## 2020-09-01 DIAGNOSIS — Z01.818 PRE-OP TESTING: Primary | ICD-10-CM

## 2020-09-01 DIAGNOSIS — E61.1 IRON DEFICIENCY: ICD-10-CM

## 2020-09-01 DIAGNOSIS — K63.9 LESION OF COLON: ICD-10-CM

## 2020-09-01 DIAGNOSIS — K21.9 GASTROESOPHAGEAL REFLUX DISEASE, ESOPHAGITIS PRESENCE NOT SPECIFIED: ICD-10-CM

## 2020-09-01 DIAGNOSIS — K22.81 ESOPHAGEAL POLYP: ICD-10-CM

## 2020-09-01 DIAGNOSIS — D50.9 IRON DEFICIENCY ANEMIA, UNSPECIFIED IRON DEFICIENCY ANEMIA TYPE: ICD-10-CM

## 2020-09-01 PROCEDURE — 0DB68ZX EXCISION OF STOMACH, VIA NATURAL OR ARTIFICIAL OPENING ENDOSCOPIC, DIAGNOSTIC: ICD-10-PCS | Performed by: INTERNAL MEDICINE

## 2020-09-01 PROCEDURE — 43239 EGD BIOPSY SINGLE/MULTIPLE: CPT | Performed by: INTERNAL MEDICINE

## 2020-09-01 PROCEDURE — 0DJD8ZZ INSPECTION OF LOWER INTESTINAL TRACT, VIA NATURAL OR ARTIFICIAL OPENING ENDOSCOPIC: ICD-10-PCS | Performed by: INTERNAL MEDICINE

## 2020-09-01 PROCEDURE — 45330 DIAGNOSTIC SIGMOIDOSCOPY: CPT | Performed by: INTERNAL MEDICINE

## 2020-09-01 RX ORDER — ONDANSETRON 2 MG/ML
4 INJECTION INTRAMUSCULAR; INTRAVENOUS ONCE AS NEEDED
Status: DISCONTINUED | OUTPATIENT
Start: 2020-09-01 | End: 2020-09-01

## 2020-09-01 RX ORDER — LIDOCAINE HYDROCHLORIDE 10 MG/ML
INJECTION, SOLUTION EPIDURAL; INFILTRATION; INTRACAUDAL; PERINEURAL AS NEEDED
Status: DISCONTINUED | OUTPATIENT
Start: 2020-09-01 | End: 2020-09-01 | Stop reason: SURG

## 2020-09-01 RX ORDER — SODIUM CHLORIDE, SODIUM LACTATE, POTASSIUM CHLORIDE, CALCIUM CHLORIDE 600; 310; 30; 20 MG/100ML; MG/100ML; MG/100ML; MG/100ML
INJECTION, SOLUTION INTRAVENOUS CONTINUOUS
Status: DISCONTINUED | OUTPATIENT
Start: 2020-09-01 | End: 2020-09-01

## 2020-09-01 RX ORDER — NALOXONE HYDROCHLORIDE 0.4 MG/ML
80 INJECTION, SOLUTION INTRAMUSCULAR; INTRAVENOUS; SUBCUTANEOUS AS NEEDED
Status: DISCONTINUED | OUTPATIENT
Start: 2020-09-01 | End: 2020-09-01

## 2020-09-01 RX ADMIN — SODIUM CHLORIDE, SODIUM LACTATE, POTASSIUM CHLORIDE, CALCIUM CHLORIDE: 600; 310; 30; 20 INJECTION, SOLUTION INTRAVENOUS at 10:55:00

## 2020-09-01 RX ADMIN — LIDOCAINE HYDROCHLORIDE 80 MG: 10 INJECTION, SOLUTION EPIDURAL; INFILTRATION; INTRACAUDAL; PERINEURAL at 10:11:00

## 2020-09-01 NOTE — TELEPHONE ENCOUNTER
Pt contacted and reviewed Dr. Victoria Handsome message below. She states she was told Dr. Consuelo Cabrera is no longer at [facility] and scheduled her appt with Dr. Shyam Peters for 1/2021.      I reviewed a STAT referral w/ her records were faxed over to Dr. John Branham office to [facility]

## 2020-09-01 NOTE — OPERATIVE REPORT
ESOPHAGOGASTRODUODENOSCOPY (EGD) & COLONOSCOPY REPORT    Cira Coleman     1965 Age 60year old   PCP Madelin Serna MD Endoscopist Mike Messina MD     Date of procedure: 20    Procedure: EGD w/cold biopsy & Colonoscopy (incomplete) small 1 cm hiatal hernia is now present. The esophageal mucosa appeared normal other than the mild LA Class A reflux esophagitis. 2. Stomach: The stomach distended normally. Normal rugal folds were seen. The pylorus was patent.  The gastric mucosa appeare

## 2020-09-01 NOTE — TELEPHONE ENCOUNTER
Patient contacted the physician, Dr Tk Reyna referred her to at [facility], and unable to schedule until January 2021. Patient was wondering if there was another physician she can be referred to. Please call. Thank you.

## 2020-09-01 NOTE — ANESTHESIA PREPROCEDURE EVALUATION
Anesthesia PreOp Note    HPI:     Becky Reyes is a 60year old female who presents for preoperative consultation requested by: Rhonda Goltz, MD    Date of Surgery: 9/1/2020    Procedure(s):  COLONOSCOPY  ESOPHAGOGASTRODUODENOSCOPY (EGD)  Indication Disp: 90 tablet, Rfl: 0, 8/31/2020  ATENOLOL 50 MG Oral Tab, Take 1 tablet by mouth once daily, Disp: 90 tablet, Rfl: 0, 8/31/2020  HYDROCHLOROTHIAZIDE 25 MG Oral Tab, Take 1 tablet by mouth once daily, Disp: 90 tablet, Rfl: 0, 8/30/2020  Omeprazole 40 MG times a month        Drinks per session: 1 or 2        Comment: once a week      Drug use: Never      Sexual activity: Not on file    Lifestyle      Physical activity:        Days per week: Not on file        Minutes per session: Not on file      Stress: N No history of anesthetic complications   Airway   Mallampati: I  TM distance: >3 FB  Neck ROM: full  Dental    (+) partials    Pulmonary - normal exam   Cardiovascular - normal exam  (+) hypertension well controlled,     ECG reviewed    Neuro/Psych    (

## 2020-09-01 NOTE — H&P
History & Physical Examination    Patient Name: Bekah Yeager  MRN: [id number]  CSN: [id number]  YOB: 1965    Diagnosis: iron def anemia    Potassium Chloride ER 20 MEQ Oral Tab CR, E. coli tablet p.o. twice a day for 2 days, continue 1 tab (CPT=19281)  2005   • NEEDLE BIOPSY RIGHT      in her 19's     Family History   Problem Relation Age of Onset   • Breast Cancer Maternal Aunt         Not Mat Aunt but Paternal   • Breast Cancer Paternal Aunt         in her 52's     Social History    [place]

## 2020-09-01 NOTE — TELEPHONE ENCOUNTER
Dr. Angelita Camilo-    Please advise on pt message below. Per CLN op note from today pt is to establish care at academic center for repeat c-scope. Please advise whom she is to supposed to f/u with and which academic center? Thank you.

## 2020-09-01 NOTE — ANESTHESIA POSTPROCEDURE EVALUATION
Patient: Zack Kimbrough    Procedure Summary     Date:  09/01/20 Room / Location:  [address] [place] [address] ENDOSCOPY 01 / [address] [place] [address] ENDOSCOPY    Anesthesia Start:  1009 Anesthesia Stop:  1107    Procedures:       COLONOSCOPY (N/A )      ESOPHAGOGASTRODUODENOSCOPY (EGD) (N/A ) Amelia

## 2020-09-01 NOTE — TELEPHONE ENCOUNTER
Patient with incomplete c-scope, needs repeat c-scope attempt at [facility] center. With her insurance, she should be able to go to [facility]. Either Dr. Laura Augustine or Dr. Shawn Hurst (GI). Ideally seen in the next 1 month.

## 2020-09-02 ENCOUNTER — TELEPHONE (OUTPATIENT)
Dept: GASTROENTEROLOGY | Facility: CLINIC | Age: 55
End: 2020-09-02

## 2020-09-02 RX ORDER — AMITRIPTYLINE HYDROCHLORIDE 10 MG/1
TABLET, FILM COATED ORAL
Qty: 60 TABLET | Refills: 0 | Status: SHIPPED | OUTPATIENT
Start: 2020-09-02 | End: 2020-11-20

## 2020-09-02 RX ORDER — OMEPRAZOLE 40 MG/1
40 CAPSULE, DELAYED RELEASE ORAL DAILY
Qty: 30 CAPSULE | Refills: 1 | Status: SHIPPED | OUTPATIENT
Start: 2020-09-02 | End: 2020-11-01

## 2020-09-02 NOTE — TELEPHONE ENCOUNTER
Thanks Rafael Jorge - she should have omeprazole refills, but I did send a new Omeprazole 40mg/day rx to pharmacy. If she is already on omeprazole then increase to BID. However no other no medicine for the reflux esophagitis.

## 2020-09-02 NOTE — TELEPHONE ENCOUNTER
Spoke to patient and relayed Dr. Lazaro Figueroa message as shown below. Patient verbalized understanding of whole message and had no further questions at this time.

## 2020-09-02 NOTE — TELEPHONE ENCOUNTER
Patient states she had CLN yesterday, 9/1/20 and thought Dr Naun Kinney had mentioned about starting medication. Patient requesting to discuss this. Please call. Thank you.

## 2020-09-02 NOTE — TELEPHONE ENCOUNTER
Dr. Severa Abed, please see message below and advise. Noted on 9/1/2020 Operative report (copied and pasted below) Omeprazole 40 mg/day was recommended. Please advise if any other medications were to be started. Thank you. Recommend:  · Await pathology.    ·

## 2020-09-03 ENCOUNTER — LAB ENCOUNTER (OUTPATIENT)
Dept: LAB | Age: 55
End: 2020-09-03
Attending: INTERNAL MEDICINE
Payer: MEDICAID

## 2020-09-03 VITALS
WEIGHT: 200 LBS | HEART RATE: 71 BPM | TEMPERATURE: 99 F | DIASTOLIC BLOOD PRESSURE: 86 MMHG | RESPIRATION RATE: 20 BRPM | HEIGHT: 63 IN | OXYGEN SATURATION: 100 % | BODY MASS INDEX: 35.44 KG/M2 | SYSTOLIC BLOOD PRESSURE: 144 MMHG

## 2020-09-03 DIAGNOSIS — E87.6 HYPOKALEMIA: ICD-10-CM

## 2020-09-03 DIAGNOSIS — R77.8 ELEVATED TOTAL PROTEIN: ICD-10-CM

## 2020-09-03 PROCEDURE — 36415 COLL VENOUS BLD VENIPUNCTURE: CPT

## 2020-09-03 PROCEDURE — 84165 PROTEIN E-PHORESIS SERUM: CPT

## 2020-09-03 NOTE — TELEPHONE ENCOUNTER
LYUBOV for [facility] GI RN department ([phone]) to see if referral was received for this patient and if she can be scheduled for expedited appt. -Awaiting c/b at this time.

## 2020-09-09 LAB
ALBUMIN SERPL ELPH-MCNC: 3.73 G/DL (ref 3.75–5.21)
ALBUMIN/GLOB SERPL: 1.11 {RATIO} (ref 1–2)
ALPHA1 GLOB SERPL ELPH-MCNC: 0.37 G/DL (ref 0.19–0.46)
ALPHA2 GLOB SERPL ELPH-MCNC: 0.8 G/DL (ref 0.48–1.05)
B-GLOBULIN SERPL ELPH-MCNC: 1.02 G/DL (ref 0.68–1.23)
GAMMA GLOB SERPL ELPH-MCNC: 1.18 G/DL (ref 0.62–1.7)
M PROTEIN MFR SERPL ELPH: 7.1 G/DL (ref 6.4–8.2)

## 2020-09-09 NOTE — TELEPHONE ENCOUNTER
I attempted to contact [facility] again, but their voicemail clearly requests not sending any duplicate voicemails due to their volume. I contacted patient and relayed. We will wait a few more days for call back. Patient attempting to get in before January.  Elida Moss

## 2020-09-09 NOTE — TELEPHONE ENCOUNTER
Pt calling to check status on if appt with [facility] GI dept can be expedited.  Please call [phone]

## 2020-09-10 NOTE — TELEPHONE ENCOUNTER
LYUBOV for , Emilie Milan at [facility] ([phone]) regarding an update on case below for this patient as no c/b or update has been received from the nursing staff at [facility]. -Awaiting c/b at this time.

## 2020-09-10 NOTE — TELEPHONE ENCOUNTER
Call received from Ceci Merrill MA/Dr. Wynne's office ([phone]) and states she did not receive records/request for expedited appt since at the time they were sent she was just coming back from vacation?     Copy of records and referral information faxed etienne

## 2020-09-10 NOTE — TELEPHONE ENCOUNTER
LM for [facility] GI RN/GI Administrative office/ GI  office Department ([phone]) for update on this case. Reviewed I need to speak to .    -Awaiting c/b at this time.

## 2020-09-14 NOTE — TELEPHONE ENCOUNTER
Spoke to Giovanny Lane ([phone]) for update on this case.  She states the entire staff is on strike at [address] and d/t the circumstances, she has sent another message to Dr. Guicho Pitts to see when this pt can be seen sooner (rather than current appt on 1/6/202

## 2020-09-18 ENCOUNTER — TELEPHONE (OUTPATIENT)
Dept: GASTROENTEROLOGY | Facility: CLINIC | Age: 55
End: 2020-09-18

## 2020-09-18 NOTE — TELEPHONE ENCOUNTER
Spoke with patient. States she is having infrequent BMs that are small in volume and hard. Last stool was 9/16. States she feels the urge to go but then nothing passes. She denies abdominal cramping or rectal pain or bleeding.   Takes a daily fiber pill

## 2020-09-18 NOTE — TELEPHONE ENCOUNTER
Dr. Netta Cruz please advise another academic preference due to strike at [facility]

## 2020-09-18 NOTE — TELEPHONE ENCOUNTER
Spoke with patient and notified with understanding. See referral documentation on separate encounter.

## 2020-09-18 NOTE — TELEPHONE ENCOUNTER
Recommend:  Use 195 to 300 ml of magnesium citrate (over the counter) with a full glass of water. It may be taken as a single dose or   divided doses. It can cause significant diarrhea, so monitor for this and stop if diarrhea is too severe.      She should

## 2020-09-18 NOTE — TELEPHONE ENCOUNTER
Spoke with patient and she agrees to go to [facility] for tertiary care. Referral generated and routed to managed care staff.

## 2020-09-18 NOTE — TELEPHONE ENCOUNTER
Pt inquiring if she can have a recommendation for a new GI specialist at a different hospital due to the situation that is currently happening with [facility].  Please call [phone]

## 2020-09-18 NOTE — TELEPHONE ENCOUNTER
Left message to call back. See also telephone encounter regarding referral.  Patient may be referred to [facility] if she wishes.

## 2020-09-18 NOTE — TELEPHONE ENCOUNTER
Pt states after CLN completed on 9/1/20 she has been unable to have a bowel movement everyday. Pt states bowel movements are not as frequent as it was before.  Please call [phone]

## 2020-09-21 NOTE — TELEPHONE ENCOUNTER
Hello,    Patient has a PPO insurance. She does not need a referral authorization.     Patient needs to make sure they are in her network by calling the back of her insurance card and speaking to customer service, and also needs to make sure the doctor kavitha

## 2020-09-21 NOTE — TELEPHONE ENCOUNTER
Patient contacted and given information below. She will check with insurance, and if ok, call for appt .

## 2020-09-22 ENCOUNTER — TELEPHONE (OUTPATIENT)
Dept: GASTROENTEROLOGY | Facility: CLINIC | Age: 55
End: 2020-09-22

## 2020-09-22 NOTE — TELEPHONE ENCOUNTER
Pt called to speak to RN about referral to [facility]. She states that insurance will cover  Dr. Roselyn Dubois and Dr. Tavon Garcia. Please call.

## 2020-09-22 NOTE — TELEPHONE ENCOUNTER
Dr. Lois Alex, spoke to patient. Patient is inquiring which of the following doctors you recommend she see as they are covered by her insurance. Please advise. Thank you.

## 2020-09-23 ENCOUNTER — TELEPHONE (OUTPATIENT)
Dept: GASTROENTEROLOGY | Facility: CLINIC | Age: 55
End: 2020-09-23

## 2020-09-23 NOTE — TELEPHONE ENCOUNTER
From below messages, patient is going to [facility] rather than [facility]. I called them back at [phone] and spoke to [facility] will relay this to Ghulam.

## 2020-09-23 NOTE — TELEPHONE ENCOUNTER
Beena/[facility] states that records have been received and clinical staff will reach out to pt to schedule procedure. No need to call back unless questions.

## 2020-09-24 NOTE — TELEPHONE ENCOUNTER
Referral and records faxed to [facility]/ Dr. Flako Terrell at [phone], confirmation received from 9/24/2020 @ 2102.

## 2020-11-20 RX ORDER — AMITRIPTYLINE HYDROCHLORIDE 10 MG/1
TABLET, FILM COATED ORAL
Qty: 60 TABLET | Refills: 0 | Status: SHIPPED | OUTPATIENT
Start: 2020-11-20 | End: 2021-05-16

## 2020-11-20 RX ORDER — HYDROCHLOROTHIAZIDE 25 MG/1
25 TABLET ORAL DAILY
Qty: 30 TABLET | Refills: 0 | Status: SHIPPED | OUTPATIENT
Start: 2020-11-20 | End: 2021-01-02

## 2020-11-20 RX ORDER — ATENOLOL 50 MG/1
TABLET ORAL
Qty: 90 TABLET | Refills: 0 | Status: SHIPPED | OUTPATIENT
Start: 2020-11-20 | End: 2021-04-24

## 2020-11-27 ENCOUNTER — MED REC SCAN ONLY (OUTPATIENT)
Dept: INTERNAL MEDICINE CLINIC | Facility: CLINIC | Age: 55
End: 2020-11-27

## 2021-01-02 ENCOUNTER — TELEPHONE (OUTPATIENT)
Dept: INTERNAL MEDICINE CLINIC | Facility: CLINIC | Age: 56
End: 2021-01-02

## 2021-01-02 RX ORDER — HYDROCHLOROTHIAZIDE 25 MG/1
TABLET ORAL
Qty: 30 TABLET | Refills: 0 | Status: SHIPPED | OUTPATIENT
Start: 2021-01-02 | End: 2021-02-16

## 2021-01-03 NOTE — TELEPHONE ENCOUNTER
Please call patient I refilled diuretic for 1 month she was due for follow-up blood work, needs to make appointment when asked patient to come for follow-up after she completes blood test, orders in computer

## 2021-01-03 NOTE — TELEPHONE ENCOUNTER
Please call patient I refilled diuretic for 1 month, she is due to have follow-up lab order placed in the computer CMP and asked patient to come for follow-up visit after she completes blood test

## 2021-01-04 NOTE — TELEPHONE ENCOUNTER
Spoke with pt. Informed per nk`s notes. Pt. Verbalized understanding. Given patient  central scheduling to make an appointment for lab and office visit appt. To see Dr. Mykel Mcarthur

## 2021-01-22 ENCOUNTER — LAB ENCOUNTER (OUTPATIENT)
Dept: LAB | Age: 56
End: 2021-01-22
Attending: INTERNAL MEDICINE
Payer: MEDICAID

## 2021-01-22 DIAGNOSIS — E87.6 HYPOKALEMIA: ICD-10-CM

## 2021-01-22 DIAGNOSIS — R89.9 ABNORMAL LABORATORY TEST RESULT: ICD-10-CM

## 2021-01-22 LAB
ALBUMIN SERPL-MCNC: 3.5 G/DL (ref 3.4–5)
ALBUMIN/GLOB SERPL: 0.8 {RATIO} (ref 1–2)
ALP LIVER SERPL-CCNC: 124 U/L
ALT SERPL-CCNC: 26 U/L
ANION GAP SERPL CALC-SCNC: 5 MMOL/L (ref 0–18)
AST SERPL-CCNC: 19 U/L (ref 15–37)
BASOPHILS # BLD AUTO: 0.02 X10(3) UL (ref 0–0.2)
BASOPHILS NFR BLD AUTO: 0.5 %
BILIRUB SERPL-MCNC: 0.4 MG/DL (ref 0.1–2)
BUN BLD-MCNC: 23 MG/DL (ref 7–18)
BUN/CREAT SERPL: 20.4 (ref 10–20)
CALCIUM BLD-MCNC: 9.9 MG/DL (ref 8.5–10.1)
CHLORIDE SERPL-SCNC: 107 MMOL/L (ref 98–112)
CO2 SERPL-SCNC: 29 MMOL/L (ref 21–32)
CREAT BLD-MCNC: 1.13 MG/DL
DEPRECATED RDW RBC AUTO: 39.3 FL (ref 35.1–46.3)
EOSINOPHIL # BLD AUTO: 0.05 X10(3) UL (ref 0–0.7)
EOSINOPHIL NFR BLD AUTO: 1.2 %
ERYTHROCYTE [DISTWIDTH] IN BLOOD BY AUTOMATED COUNT: 13.8 % (ref 11–15)
GLOBULIN PLAS-MCNC: 4.5 G/DL (ref 2.8–4.4)
GLUCOSE BLD-MCNC: 93 MG/DL (ref 70–99)
HCT VFR BLD AUTO: 46.1 %
HGB BLD-MCNC: 14.2 G/DL
IMM GRANULOCYTES # BLD AUTO: 0.01 X10(3) UL (ref 0–1)
IMM GRANULOCYTES NFR BLD: 0.2 %
LYMPHOCYTES # BLD AUTO: 1.62 X10(3) UL (ref 1–4)
LYMPHOCYTES NFR BLD AUTO: 37.5 %
M PROTEIN MFR SERPL ELPH: 8 G/DL (ref 6.4–8.2)
MCH RBC QN AUTO: 24.4 PG (ref 26–34)
MCHC RBC AUTO-ENTMCNC: 30.8 G/DL (ref 31–37)
MCV RBC AUTO: 79.2 FL
MONOCYTES # BLD AUTO: 0.42 X10(3) UL (ref 0.1–1)
MONOCYTES NFR BLD AUTO: 9.7 %
NEUTROPHILS # BLD AUTO: 2.2 X10 (3) UL (ref 1.5–7.7)
NEUTROPHILS # BLD AUTO: 2.2 X10(3) UL (ref 1.5–7.7)
NEUTROPHILS NFR BLD AUTO: 50.9 %
OSMOLALITY SERPL CALC.SUM OF ELEC: 295 MOSM/KG (ref 275–295)
PATIENT FASTING Y/N/NP: YES
PLATELET # BLD AUTO: 261 10(3)UL (ref 150–450)
POTASSIUM SERPL-SCNC: 3.8 MMOL/L (ref 3.5–5.1)
RBC # BLD AUTO: 5.82 X10(6)UL
SODIUM SERPL-SCNC: 141 MMOL/L (ref 136–145)
WBC # BLD AUTO: 4.3 X10(3) UL (ref 4–11)

## 2021-01-22 PROCEDURE — 80053 COMPREHEN METABOLIC PANEL: CPT

## 2021-01-22 PROCEDURE — 85025 COMPLETE CBC W/AUTO DIFF WBC: CPT

## 2021-01-22 PROCEDURE — 36415 COLL VENOUS BLD VENIPUNCTURE: CPT

## 2021-02-16 RX ORDER — HYDROCHLOROTHIAZIDE 25 MG/1
25 TABLET ORAL DAILY
Qty: 30 TABLET | Refills: 1 | Status: SHIPPED | OUTPATIENT
Start: 2021-02-16 | End: 2021-04-24

## 2021-03-19 ENCOUNTER — LAB ENCOUNTER (OUTPATIENT)
Dept: LAB | Age: 56
End: 2021-03-19
Attending: INTERNAL MEDICINE
Payer: MEDICAID

## 2021-03-19 ENCOUNTER — OFFICE VISIT (OUTPATIENT)
Dept: INTERNAL MEDICINE CLINIC | Facility: CLINIC | Age: 56
End: 2021-03-19
Payer: MEDICAID

## 2021-03-19 VITALS
DIASTOLIC BLOOD PRESSURE: 77 MMHG | WEIGHT: 212.13 LBS | SYSTOLIC BLOOD PRESSURE: 119 MMHG | HEART RATE: 59 BPM | RESPIRATION RATE: 17 BRPM | BODY MASS INDEX: 37.59 KG/M2 | HEIGHT: 63 IN

## 2021-03-19 DIAGNOSIS — N76.1 CHRONIC VAGINITIS: ICD-10-CM

## 2021-03-19 DIAGNOSIS — R20.2 NUMBNESS AND TINGLING IN LEFT ARM: ICD-10-CM

## 2021-03-19 DIAGNOSIS — Z12.83 SKIN CANCER SCREENING: ICD-10-CM

## 2021-03-19 DIAGNOSIS — R79.89 CREATININE ELEVATION: ICD-10-CM

## 2021-03-19 DIAGNOSIS — R20.0 NUMBNESS AND TINGLING IN LEFT ARM: ICD-10-CM

## 2021-03-19 DIAGNOSIS — R25.2 MUSCLE CRAMPING: ICD-10-CM

## 2021-03-19 DIAGNOSIS — R21 RASH AND NONSPECIFIC SKIN ERUPTION: ICD-10-CM

## 2021-03-19 DIAGNOSIS — N64.4 MASTODYNIA OF LEFT BREAST: ICD-10-CM

## 2021-03-19 DIAGNOSIS — R79.89 ELEVATED SERUM CREATININE: ICD-10-CM

## 2021-03-19 DIAGNOSIS — E55.9 VITAMIN D DEFICIENCY DISEASE: ICD-10-CM

## 2021-03-19 DIAGNOSIS — R89.9 ABNORMAL LABORATORY TEST: ICD-10-CM

## 2021-03-19 DIAGNOSIS — R25.2 MUSCLE CRAMPING: Primary | ICD-10-CM

## 2021-03-19 DIAGNOSIS — L29.9 SCALP PRURITUS: ICD-10-CM

## 2021-03-19 LAB
ALBUMIN SERPL-MCNC: 3.5 G/DL (ref 3.4–5)
ALBUMIN/GLOB SERPL: 0.8 {RATIO} (ref 1–2)
ALP LIVER SERPL-CCNC: 123 U/L
ALT SERPL-CCNC: 20 U/L
ANION GAP SERPL CALC-SCNC: 9 MMOL/L (ref 0–18)
AST SERPL-CCNC: 15 U/L (ref 15–37)
BASOPHILS # BLD AUTO: 0.02 X10(3) UL (ref 0–0.2)
BASOPHILS NFR BLD AUTO: 0.4 %
BILIRUB SERPL-MCNC: 0.4 MG/DL (ref 0.1–2)
BUN BLD-MCNC: 22 MG/DL (ref 7–18)
BUN/CREAT SERPL: 19.1 (ref 10–20)
CALCIUM BLD-MCNC: 9.3 MG/DL (ref 8.5–10.1)
CHLORIDE SERPL-SCNC: 107 MMOL/L (ref 98–112)
CK SERPL-CCNC: 216 U/L
CO2 SERPL-SCNC: 27 MMOL/L (ref 21–32)
CREAT BLD-MCNC: 1.15 MG/DL
DEPRECATED RDW RBC AUTO: 38.5 FL (ref 35.1–46.3)
EOSINOPHIL # BLD AUTO: 0.05 X10(3) UL (ref 0–0.7)
EOSINOPHIL NFR BLD AUTO: 0.9 %
ERYTHROCYTE [DISTWIDTH] IN BLOOD BY AUTOMATED COUNT: 13.9 % (ref 11–15)
GLOBULIN PLAS-MCNC: 4.4 G/DL (ref 2.8–4.4)
GLUCOSE BLD-MCNC: 96 MG/DL (ref 70–99)
HAV IGM SER QL: 2.1 MG/DL (ref 1.6–2.6)
HCT VFR BLD AUTO: 43.8 %
HGB BLD-MCNC: 14 G/DL
IMM GRANULOCYTES # BLD AUTO: 0.01 X10(3) UL (ref 0–1)
IMM GRANULOCYTES NFR BLD: 0.2 %
LYMPHOCYTES # BLD AUTO: 1.77 X10(3) UL (ref 1–4)
LYMPHOCYTES NFR BLD AUTO: 33.1 %
M PROTEIN MFR SERPL ELPH: 7.9 G/DL (ref 6.4–8.2)
MCH RBC QN AUTO: 24.6 PG (ref 26–34)
MCHC RBC AUTO-ENTMCNC: 32 G/DL (ref 31–37)
MCV RBC AUTO: 77.1 FL
MONOCYTES # BLD AUTO: 0.52 X10(3) UL (ref 0.1–1)
MONOCYTES NFR BLD AUTO: 9.7 %
NEUTROPHILS # BLD AUTO: 2.98 X10 (3) UL (ref 1.5–7.7)
NEUTROPHILS # BLD AUTO: 2.98 X10(3) UL (ref 1.5–7.7)
NEUTROPHILS NFR BLD AUTO: 55.7 %
OSMOLALITY SERPL CALC.SUM OF ELEC: 299 MOSM/KG (ref 275–295)
PATIENT FASTING Y/N/NP: YES
PLATELET # BLD AUTO: 259 10(3)UL (ref 150–450)
POTASSIUM SERPL-SCNC: 3.4 MMOL/L (ref 3.5–5.1)
RBC # BLD AUTO: 5.68 X10(6)UL
SODIUM SERPL-SCNC: 143 MMOL/L (ref 136–145)
TSI SER-ACNC: 1.67 MIU/ML (ref 0.36–3.74)
WBC # BLD AUTO: 5.4 X10(3) UL (ref 4–11)

## 2021-03-19 PROCEDURE — 84443 ASSAY THYROID STIM HORMONE: CPT

## 2021-03-19 PROCEDURE — 3008F BODY MASS INDEX DOCD: CPT | Performed by: INTERNAL MEDICINE

## 2021-03-19 PROCEDURE — 99214 OFFICE O/P EST MOD 30 MIN: CPT | Performed by: INTERNAL MEDICINE

## 2021-03-19 PROCEDURE — 83735 ASSAY OF MAGNESIUM: CPT

## 2021-03-19 PROCEDURE — 82306 VITAMIN D 25 HYDROXY: CPT

## 2021-03-19 PROCEDURE — 80053 COMPREHEN METABOLIC PANEL: CPT

## 2021-03-19 PROCEDURE — 82550 ASSAY OF CK (CPK): CPT

## 2021-03-19 PROCEDURE — 36415 COLL VENOUS BLD VENIPUNCTURE: CPT

## 2021-03-19 PROCEDURE — 3074F SYST BP LT 130 MM HG: CPT | Performed by: INTERNAL MEDICINE

## 2021-03-19 PROCEDURE — 3078F DIAST BP <80 MM HG: CPT | Performed by: INTERNAL MEDICINE

## 2021-03-19 PROCEDURE — 85025 COMPLETE CBC W/AUTO DIFF WBC: CPT

## 2021-03-19 RX ORDER — KETOCONAZOLE 20 MG/ML
SHAMPOO TOPICAL
Qty: 120 ML | Refills: 0 | Status: SHIPPED | OUTPATIENT
Start: 2021-03-19 | End: 2021-06-25 | Stop reason: ALTCHOICE

## 2021-03-19 RX ORDER — OMEPRAZOLE 40 MG/1
40 CAPSULE, DELAYED RELEASE ORAL DAILY
COMMUNITY

## 2021-03-19 RX ORDER — MOMETASONE FUROATE 1 MG/G
CREAM TOPICAL
Qty: 60 G | Refills: 0 | Status: SHIPPED | OUTPATIENT
Start: 2021-03-19 | End: 2021-06-25 | Stop reason: ALTCHOICE

## 2021-03-19 RX ORDER — POTASSIUM CHLORIDE 750 MG/1
10 TABLET, EXTENDED RELEASE ORAL 2 TIMES DAILY
Qty: 30 TABLET | Refills: 0 | Status: SHIPPED | OUTPATIENT
Start: 2021-03-19 | End: 2021-05-03 | Stop reason: ALTCHOICE

## 2021-03-19 NOTE — PROGRESS NOTES
Patient ID: Wei Machado is a 60year old female.   Presents for follow-up of multiple medical problems    HPI  Patient reports that she has very itchy scalp for some time now, he has recurrent lesion on the buttock area which usually very itchy last fo was 1.13 BUN 23 and it is consistently elevated for the last year or so  Also lately bothered by vaginal discharge which could be greenish, she is not sexually active for about 20 years, she has history of hysterectomy.   She complains of tenderness in the and reactive to light. Neck:      Vascular: No carotid bruit. Cardiovascular:      Rate and Rhythm: Normal rate and regular rhythm. Heart sounds: No murmur heard. No gallop.     Pulmonary:      Effort: Pulmonary effort is normal.      Breath soun proceed with MRI of the cervical spine as recommended by neurologist, will recheck labs CBC CMP CK level magnesium level.   Proceed with EMG of both upper and lower extremities    Elevated serum creatinine ultrasound of the kidney continue adequate fluid in

## 2021-03-22 ENCOUNTER — HOSPITAL ENCOUNTER (OUTPATIENT)
Dept: MAMMOGRAPHY | Facility: HOSPITAL | Age: 56
Discharge: HOME | End: 2021-03-22
Attending: INTERNAL MEDICINE
Payer: MEDICAID

## 2021-03-22 ENCOUNTER — HOSPITAL ENCOUNTER (OUTPATIENT)
Dept: ULTRASOUND IMAGING | Facility: HOSPITAL | Age: 56
Discharge: HOME | End: 2021-03-22
Attending: INTERNAL MEDICINE
Payer: MEDICAID

## 2021-03-22 DIAGNOSIS — N64.4 MASTODYNIA OF LEFT BREAST: ICD-10-CM

## 2021-03-22 LAB — 25(OH)D3 SERPL-MCNC: 22.3 NG/ML (ref 30–100)

## 2021-03-22 PROCEDURE — 76642 ULTRASOUND BREAST LIMITED: CPT | Performed by: INTERNAL MEDICINE

## 2021-03-22 PROCEDURE — 77066 DX MAMMO INCL CAD BI: CPT | Performed by: INTERNAL MEDICINE

## 2021-03-22 PROCEDURE — 77062 BREAST TOMOSYNTHESIS BI: CPT | Performed by: INTERNAL MEDICINE

## 2021-03-23 ENCOUNTER — OFFICE VISIT (OUTPATIENT)
Dept: OBGYN CLINIC | Facility: CLINIC | Age: 56
End: 2021-03-23
Payer: MEDICAID

## 2021-03-23 VITALS
DIASTOLIC BLOOD PRESSURE: 89 MMHG | BODY MASS INDEX: 37.56 KG/M2 | WEIGHT: 212 LBS | SYSTOLIC BLOOD PRESSURE: 128 MMHG | HEART RATE: 84 BPM | HEIGHT: 63 IN

## 2021-03-23 DIAGNOSIS — Z12.4 PAPANICOLAOU SMEAR FOR CERVICAL CANCER SCREENING: ICD-10-CM

## 2021-03-23 DIAGNOSIS — N89.8 VAGINAL DISCHARGE: Primary | ICD-10-CM

## 2021-03-23 PROCEDURE — 99202 OFFICE O/P NEW SF 15 MIN: CPT | Performed by: ADVANCED PRACTICE MIDWIFE

## 2021-03-23 PROCEDURE — 3008F BODY MASS INDEX DOCD: CPT | Performed by: ADVANCED PRACTICE MIDWIFE

## 2021-03-23 PROCEDURE — 3079F DIAST BP 80-89 MM HG: CPT | Performed by: ADVANCED PRACTICE MIDWIFE

## 2021-03-23 PROCEDURE — 3074F SYST BP LT 130 MM HG: CPT | Performed by: ADVANCED PRACTICE MIDWIFE

## 2021-03-23 NOTE — PROGRESS NOTES
Robyn Benton is a 60year old female. HPI:   Patient presents with:  Gyn Exam: Possible Vaginitis, reports vaginal discharge with odor      Robyn Benton is a 60year old female.  No LMP recorded. Patient has had a hysterectomy.     Patient p Smoker      Smokeless tobacco: Never Used    Vaping Use      Vaping Use: Never used    Alcohol use: Yes      Comment: once a week    Drug use: Never       Medications (Active prior to today's visit):  Current Outpatient Medications   Medication Sig Dispens Physical Exam  Constitutional:       General: She is not in acute distress. Appearance: Normal appearance. She is obese. She is not ill-appearing. Pulmonary:      Effort: Pulmonary effort is normal.   Genitourinary:     General: Normal vulva.

## 2021-03-24 ENCOUNTER — TELEPHONE (OUTPATIENT)
Dept: FAMILY MEDICINE CLINIC | Facility: CLINIC | Age: 56
End: 2021-03-24

## 2021-03-24 NOTE — TELEPHONE ENCOUNTER
Per Dr. Gore Flatten:    \"Mammogram is normal, no abnormality in the left breast, if you continue to have pain in the left breast please let me know I may ask you to see a specialist. Traci Drew give me a call if you have questions.  By the way v

## 2021-03-24 NOTE — TELEPHONE ENCOUNTER
----- Message from Laxmi Metz MD sent at 3/24/2021  5:48 AM CDT -----  Please call patient see my chart comment, she does not check my chart

## 2021-03-25 LAB
GENITAL VAGINOSIS SCREEN: NEGATIVE
HPV I/H RISK 1 DNA SPEC QL NAA+PROBE: NEGATIVE
TRICHOMONAS SCREEN: NEGATIVE

## 2021-03-26 ENCOUNTER — TELEPHONE (OUTPATIENT)
Dept: OBGYN CLINIC | Facility: CLINIC | Age: 56
End: 2021-03-26

## 2021-03-26 NOTE — TELEPHONE ENCOUNTER
----- Message from Katharine Zelaya CNM sent at 3/26/2021  9:14 AM CDT -----  Please notify pt vaginosis swab negative for any infection. HPV negative. Pap still in process.

## 2021-03-28 ENCOUNTER — TELEPHONE (OUTPATIENT)
Dept: INTERNAL MEDICINE CLINIC | Facility: CLINIC | Age: 56
End: 2021-03-28

## 2021-04-02 ENCOUNTER — OFFICE VISIT (OUTPATIENT)
Dept: NEPHROLOGY | Facility: CLINIC | Age: 56
End: 2021-04-02
Payer: MEDICAID

## 2021-04-02 VITALS
HEIGHT: 63 IN | HEART RATE: 59 BPM | BODY MASS INDEX: 37.56 KG/M2 | SYSTOLIC BLOOD PRESSURE: 131 MMHG | WEIGHT: 212 LBS | DIASTOLIC BLOOD PRESSURE: 77 MMHG

## 2021-04-02 DIAGNOSIS — N18.2 CKD (CHRONIC KIDNEY DISEASE), STAGE II: Primary | ICD-10-CM

## 2021-04-02 PROCEDURE — 3008F BODY MASS INDEX DOCD: CPT | Performed by: INTERNAL MEDICINE

## 2021-04-02 PROCEDURE — 99244 OFF/OP CNSLTJ NEW/EST MOD 40: CPT | Performed by: INTERNAL MEDICINE

## 2021-04-02 PROCEDURE — 3075F SYST BP GE 130 - 139MM HG: CPT | Performed by: INTERNAL MEDICINE

## 2021-04-02 PROCEDURE — 3078F DIAST BP <80 MM HG: CPT | Performed by: INTERNAL MEDICINE

## 2021-04-02 RX ORDER — MULTIVIT-MIN/IRON/FOLIC ACID/K 18-600-40
CAPSULE ORAL
COMMUNITY
End: 2021-05-18

## 2021-04-02 NOTE — PROGRESS NOTES
Consult Requested By: Dr. Annabella Solorzano     Reason for Consult: CKD stage II    HPI:     Patient is a 60 yrs old female with pmh of HTN, GERD, Fibromyalgia, CKD II-III who presented for work up and evaluation of kidney disease     Lab test showed BUN/Cr Use      Vaping Use: Never used    Alcohol use: Yes      Comment: once a week    Drug use: Never       Medications (Active prior to today's visit):  Current Outpatient Medications   Medication Sig Dispense Refill   • Cholecalciferol (VITAMIN D) 50 MCG (200 loss  Cardiovascular:  Negative for chest pain, sobs  Respiratory:  Negative for cough, dyspnea and wheezing  Gastrointestinal:  Negative for abdominal pain, constipation  Genitourinary:  Negative for dysuria and hematuria  Endocrine:  Negative for abnorma 4/2/2021  Leon Castano MD

## 2021-04-02 NOTE — PATIENT INSTRUCTIONS
Urine test as ordered   Ultrasound of kidney - call to make an appointment   Follow up in 4 weeks   Keep Fluid intake between 65-70 ounces

## 2021-04-10 ENCOUNTER — IMMUNIZATION (OUTPATIENT)
Dept: LAB | Age: 56
End: 2021-04-10
Attending: HOSPITALIST
Payer: MEDICAID

## 2021-04-10 DIAGNOSIS — Z23 NEED FOR VACCINATION: Primary | ICD-10-CM

## 2021-04-10 PROCEDURE — 0001A SARSCOV2 VAC 30MCG/0.3ML IM: CPT

## 2021-04-15 ENCOUNTER — TELEPHONE (OUTPATIENT)
Dept: INTERNAL MEDICINE CLINIC | Facility: CLINIC | Age: 56
End: 2021-04-15

## 2021-04-24 RX ORDER — HYDROCHLOROTHIAZIDE 25 MG/1
25 TABLET ORAL DAILY
Qty: 90 TABLET | Refills: 0 | Status: SHIPPED | OUTPATIENT
Start: 2021-04-24 | End: 2021-05-03 | Stop reason: ALTCHOICE

## 2021-04-24 RX ORDER — ATENOLOL 50 MG/1
TABLET ORAL
Qty: 90 TABLET | Refills: 0 | Status: SHIPPED | OUTPATIENT
Start: 2021-04-24 | End: 2021-07-19

## 2021-04-27 ENCOUNTER — LAB ENCOUNTER (OUTPATIENT)
Dept: LAB | Facility: HOSPITAL | Age: 56
End: 2021-04-27
Attending: INTERNAL MEDICINE
Payer: MEDICAID

## 2021-04-27 DIAGNOSIS — N18.2 CKD (CHRONIC KIDNEY DISEASE), STAGE II: ICD-10-CM

## 2021-04-27 PROCEDURE — 82570 ASSAY OF URINE CREATININE: CPT

## 2021-04-27 PROCEDURE — 84156 ASSAY OF PROTEIN URINE: CPT

## 2021-04-27 PROCEDURE — 81001 URINALYSIS AUTO W/SCOPE: CPT

## 2021-04-29 ENCOUNTER — TELEPHONE (OUTPATIENT)
Dept: INTERNAL MEDICINE CLINIC | Facility: CLINIC | Age: 56
End: 2021-04-29

## 2021-04-30 ENCOUNTER — TELEPHONE (OUTPATIENT)
Dept: NEPHROLOGY | Facility: CLINIC | Age: 56
End: 2021-04-30

## 2021-04-30 NOTE — TELEPHONE ENCOUNTER
Patient missed her appointment today, patient requesting follow up appointment, next available not until 6/8. Please call at [phone],[phone].

## 2021-05-01 ENCOUNTER — IMMUNIZATION (OUTPATIENT)
Dept: LAB | Age: 56
End: 2021-05-01
Attending: HOSPITALIST
Payer: MEDICAID

## 2021-05-01 ENCOUNTER — NURSE TRIAGE (OUTPATIENT)
Dept: INTERNAL MEDICINE CLINIC | Facility: CLINIC | Age: 56
End: 2021-05-01

## 2021-05-01 DIAGNOSIS — Z23 NEED FOR VACCINATION: Primary | ICD-10-CM

## 2021-05-01 PROCEDURE — 0002A SARSCOV2 VAC 30MCG/0.3ML IM: CPT

## 2021-05-01 NOTE — TELEPHONE ENCOUNTER
Action Requested: Summary for Provider     []  Critical Lab, Recommendations Needed  [x] Need Additional Advice  []   FYI    []   Need Orders  [] Need Medications Sent to Pharmacy  []  Other     SUMMARY: Patient c/o left arm pain, which she feels started a

## 2021-05-01 NOTE — TELEPHONE ENCOUNTER
Spoke to patient, she states that she was doing heavy lifting, and 3 weeks ago started to experience pain in the left upper , had difficulty moving left shoulder , today shoulder feels better somewhat still restricted movements, advised her to avoid heavy

## 2021-05-03 ENCOUNTER — OFFICE VISIT (OUTPATIENT)
Dept: NEPHROLOGY | Facility: CLINIC | Age: 56
End: 2021-05-03
Payer: MEDICAID

## 2021-05-03 VITALS
WEIGHT: 221 LBS | DIASTOLIC BLOOD PRESSURE: 74 MMHG | SYSTOLIC BLOOD PRESSURE: 140 MMHG | HEIGHT: 63 IN | HEART RATE: 59 BPM | BODY MASS INDEX: 39.16 KG/M2

## 2021-05-03 DIAGNOSIS — N18.2 CKD (CHRONIC KIDNEY DISEASE), STAGE II: ICD-10-CM

## 2021-05-03 DIAGNOSIS — I10 ESSENTIAL HYPERTENSION: Primary | ICD-10-CM

## 2021-05-03 PROCEDURE — 99214 OFFICE O/P EST MOD 30 MIN: CPT | Performed by: INTERNAL MEDICINE

## 2021-05-03 PROCEDURE — 3008F BODY MASS INDEX DOCD: CPT | Performed by: INTERNAL MEDICINE

## 2021-05-03 PROCEDURE — 3078F DIAST BP <80 MM HG: CPT | Performed by: INTERNAL MEDICINE

## 2021-05-03 PROCEDURE — 3077F SYST BP >= 140 MM HG: CPT | Performed by: INTERNAL MEDICINE

## 2021-05-03 RX ORDER — LOSARTAN POTASSIUM 25 MG/1
25 TABLET ORAL DAILY
Qty: 90 TABLET | Refills: 0 | Status: SHIPPED | OUTPATIENT
Start: 2021-05-03 | End: 2021-05-18 | Stop reason: DRUGHIGH

## 2021-05-03 NOTE — PROGRESS NOTES
Progress Note:      Patient is a 60 yrs old female with pmh of HTN, GERD, Fibromyalgia, CKD II-III who presented for follow up     Lab test showed BUN/Cr 22/1.15 mg/dl with an eGFR 61 ml/min - creatinine stable atleast since 2019.  Potassium at 3.4 and bi Vitamin (MULTI-VITAMIN) Oral Tab Take 1 tablet by mouth daily. • tiZANidine HCl 2 MG Oral Tab Take 1 tablet (2 mg total) by mouth every 8 (eight) hours as needed.  30 tablet 0   • hydrochlorothiazide 25 MG Oral Tab Take 1 tablet (25 mg total) by mouth d sobs  Respiratory:  Negative for cough, dyspnea and wheezing  Gastrointestinal:  Negative for abdominal pain, constipation  Genitourinary:  Negative for dysuria and hematuria  Endocrine:  Negative for abnormal sleep patterns  Hema/Lymph:  Negative for easy Visit:  Requested Prescriptions      No prescriptions requested or ordered in this encounter       Imaging & Referrals:  None     5/3/2021  Ramona No MD

## 2021-05-03 NOTE — PATIENT INSTRUCTIONS
Stop hydrochlorothiazide and potassium supplement   Start on losartan 25 mg daily at evening   Call in 2 weeks with home blood pressure readings   Follow up in 4 months   Bring blood pressure monitor at next visit

## 2021-05-05 ENCOUNTER — HOSPITAL ENCOUNTER (OUTPATIENT)
Dept: ULTRASOUND IMAGING | Facility: HOSPITAL | Age: 56
Discharge: HOME | End: 2021-05-05
Attending: INTERNAL MEDICINE
Payer: MEDICAID

## 2021-05-05 DIAGNOSIS — N18.2 CKD (CHRONIC KIDNEY DISEASE), STAGE II: ICD-10-CM

## 2021-05-05 PROCEDURE — 76770 US EXAM ABDO BACK WALL COMP: CPT | Performed by: INTERNAL MEDICINE

## 2021-05-07 ENCOUNTER — HOSPITAL ENCOUNTER (EMERGENCY)
Facility: HOSPITAL | Age: 56
Discharge: HOME | End: 2021-05-07
Attending: EMERGENCY MEDICINE
Payer: MEDICAID

## 2021-05-07 ENCOUNTER — APPOINTMENT (OUTPATIENT)
Dept: GENERAL RADIOLOGY | Facility: HOSPITAL | Age: 56
End: 2021-05-07
Payer: MEDICAID

## 2021-05-07 VITALS
OXYGEN SATURATION: 98 % | TEMPERATURE: 98 F | HEART RATE: 50 BPM | SYSTOLIC BLOOD PRESSURE: 145 MMHG | RESPIRATION RATE: 26 BRPM | DIASTOLIC BLOOD PRESSURE: 79 MMHG

## 2021-05-07 DIAGNOSIS — M79.602 PAIN OF LEFT UPPER EXTREMITY: Primary | ICD-10-CM

## 2021-05-07 PROCEDURE — 85025 COMPLETE CBC W/AUTO DIFF WBC: CPT

## 2021-05-07 PROCEDURE — 93010 ELECTROCARDIOGRAM REPORT: CPT | Performed by: EMERGENCY MEDICINE

## 2021-05-07 PROCEDURE — 99285 EMERGENCY DEPT VISIT HI MDM: CPT

## 2021-05-07 PROCEDURE — 96374 THER/PROPH/DIAG INJ IV PUSH: CPT

## 2021-05-07 PROCEDURE — 80048 BASIC METABOLIC PNL TOTAL CA: CPT | Performed by: EMERGENCY MEDICINE

## 2021-05-07 PROCEDURE — 84484 ASSAY OF TROPONIN QUANT: CPT

## 2021-05-07 PROCEDURE — 71045 X-RAY EXAM CHEST 1 VIEW: CPT | Performed by: EMERGENCY MEDICINE

## 2021-05-07 PROCEDURE — 80048 BASIC METABOLIC PNL TOTAL CA: CPT

## 2021-05-07 PROCEDURE — 93005 ELECTROCARDIOGRAM TRACING: CPT

## 2021-05-07 PROCEDURE — 84484 ASSAY OF TROPONIN QUANT: CPT | Performed by: EMERGENCY MEDICINE

## 2021-05-07 PROCEDURE — 85025 COMPLETE CBC W/AUTO DIFF WBC: CPT | Performed by: EMERGENCY MEDICINE

## 2021-05-07 RX ORDER — KETOROLAC TROMETHAMINE 30 MG/ML
30 INJECTION, SOLUTION INTRAMUSCULAR; INTRAVENOUS ONCE
Status: COMPLETED | OUTPATIENT
Start: 2021-05-07 | End: 2021-05-07

## 2021-05-07 RX ORDER — TRAMADOL HYDROCHLORIDE 50 MG/1
50 TABLET ORAL EVERY 6 HOURS PRN
Qty: 10 TABLET | Refills: 0 | Status: SHIPPED | OUTPATIENT
Start: 2021-05-07 | End: 2021-05-14

## 2021-05-07 NOTE — ED PROVIDER NOTES
Patient Seen in: [facility] AND [facility] Emergency Department      History   Patient presents with:  Chest Pain Angina  Arm Pain    Stated Complaint: chest pain; left arm pain    HPI/Subjective:   HPI    17-year-old female with history of fibromyalgia, hyperte Gastrointestinal: Negative for abdominal pain. Genitourinary: Negative for dysuria. Musculoskeletal: Positive for myalgias. Negative for back pain. Skin: Negative for rash. Neurological: Negative for dizziness.    Psychiatric/Behavioral: Negative at a rate of 60 bpm.     My interpretation is   normal for rate   Normal for rhythm    Pulse Oximeter:  Pulse oximetry on room air is 98%, indicating adequate oxygenation.     PROCEDURES:  none    DIAGNOSTICS:   Labs:  Recent Results (from the past 24 hour( 1.00 x10(3) uL    Eosinophil Absolute 0.07 0.00 - 0.70 x10(3) uL    Basophil Absolute 0.03 0.00 - 0.20 x10(3) uL    Immature Granulocyte Absolute 0.01 0.00 - 1.00 x10(3) uL    Neutrophil % 55.3 %    Lymphocyte % 33.4 %    Monocyte % 9.2 %    Eosinophil % 1 suggesting early invasive measures        The patient was informed of their elevated blood pressure reading in the Emergency Department. They were informed of the dangers of undiagnosed and untreated hypertension.   Education regarding lifestyle modificati needed.   Qty: 10 tablet Refills: 0

## 2021-05-07 NOTE — ED INITIAL ASSESSMENT (HPI)
Lindsay Veronica is here for evaluation of left neck/arm/chest pain and fatigue for the last three weeks or so. States symptoms may have began after lifting something heavy.

## 2021-05-14 ENCOUNTER — OFFICE VISIT (OUTPATIENT)
Dept: INTERNAL MEDICINE CLINIC | Facility: CLINIC | Age: 56
End: 2021-05-14
Payer: MEDICAID

## 2021-05-14 ENCOUNTER — TELEPHONE (OUTPATIENT)
Dept: INTERNAL MEDICINE CLINIC | Facility: CLINIC | Age: 56
End: 2021-05-14

## 2021-05-14 VITALS
RESPIRATION RATE: 18 BRPM | BODY MASS INDEX: 39.27 KG/M2 | WEIGHT: 221.63 LBS | HEART RATE: 54 BPM | DIASTOLIC BLOOD PRESSURE: 98 MMHG | HEIGHT: 63 IN | SYSTOLIC BLOOD PRESSURE: 182 MMHG

## 2021-05-14 DIAGNOSIS — R20.2 NUMBNESS AND TINGLING IN LEFT ARM: Primary | ICD-10-CM

## 2021-05-14 DIAGNOSIS — R20.0 NUMBNESS AND TINGLING IN LEFT ARM: Primary | ICD-10-CM

## 2021-05-14 DIAGNOSIS — I10 ESSENTIAL HYPERTENSION: ICD-10-CM

## 2021-05-14 PROCEDURE — 3080F DIAST BP >= 90 MM HG: CPT | Performed by: INTERNAL MEDICINE

## 2021-05-14 PROCEDURE — 3008F BODY MASS INDEX DOCD: CPT | Performed by: INTERNAL MEDICINE

## 2021-05-14 PROCEDURE — 99214 OFFICE O/P EST MOD 30 MIN: CPT | Performed by: INTERNAL MEDICINE

## 2021-05-14 PROCEDURE — 3077F SYST BP >= 140 MM HG: CPT | Performed by: INTERNAL MEDICINE

## 2021-05-14 NOTE — PROGRESS NOTES
HPI/Subjective:   Patient ID: Ese Barba is a 60year old female.   Resents for follow-up after recent ER visit left arm pain    HPI  Patient presents for follow-up after ER visit, she was seen there for evaluation of the left arm pain associated with scalp 120 mL 0   • Mometasone Furoate 0.1 % External Cream Apply twice a day  To affected area  For 2 weeks 60 g 0   • Aspirin 81 MG Oral Tab Take 81 mg by mouth daily.      • Diclofenac Sodium (VOLTAREN) 1 % External Gel Apply to affected area twice daily shoulder: No deformity. Normal range of motion. Normal strength. Left shoulder: No deformity. Decreased range of motion. Normal strength. Cervical back: Normal range of motion and neck supple. Right lower leg: No edema.       Left lower leg:

## 2021-05-14 NOTE — TELEPHONE ENCOUNTER
BENTLEY.  After changing medications as you recommended patient's blood pressure is high, she feels more tired, I advised her to double on losartan she will be taking 50 mg daily continue atenolol, being off diuretic kidney function test significantly improved

## 2021-05-17 ENCOUNTER — TELEPHONE (OUTPATIENT)
Dept: NEPHROLOGY | Facility: CLINIC | Age: 56
End: 2021-05-17

## 2021-05-17 ENCOUNTER — TELEPHONE (OUTPATIENT)
Dept: INTERNAL MEDICINE CLINIC | Facility: CLINIC | Age: 56
End: 2021-05-17

## 2021-05-17 DIAGNOSIS — N18.2 CKD (CHRONIC KIDNEY DISEASE), STAGE II: Primary | ICD-10-CM

## 2021-05-17 RX ORDER — SPIRONOLACTONE 25 MG/1
12.5 TABLET ORAL DAILY
Qty: 30 TABLET | Refills: 0 | Status: SHIPPED | OUTPATIENT
Start: 2021-05-17 | End: 2021-05-18

## 2021-05-17 NOTE — TELEPHONE ENCOUNTER
Spoke with pt. Pt is very concerned about taking too many medications. States she was on a diuretic but was taken off of it and is hesitant to start back on it. Explained to pt that this is medical advice to help lower her blood pressure. Requesting to speak to Dr. Elizabeth Pablo before taking medication. Rx sent to pharmacy.  Thanks, Dr. Elizabeth Pablo

## 2021-05-17 NOTE — TELEPHONE ENCOUNTER
ADDENDUM TO PREVIOUS MESSAGE    Pt also requesting to be seen in the office earlier than first avail. Ok to double book an apt sooner?

## 2021-05-17 NOTE — TELEPHONE ENCOUNTER
Spoke with patient. States she has been experiencing high BP readings since Friday. States BP today is 189/93. She retook it and it was 170/94. Unable to provide any BP readings from the weekend. Denies chest pain, SOB, dizziness, lightheadedness. States she is on losartan potassium 25mg daily but was told by PCP to start losartan 50mg daily and has been on that since Friday 5/14. Pt also taking atenolol 50mg daily and stopped hydrochlorothiazide per MD instructions. Requesting if BP medications need to be adjusted? Thank you Dr. Shah Daily pt to call back if she starts experiencing any symptoms of HTN, but denies symptoms at this time. States she feels fine.

## 2021-05-17 NOTE — TELEPHONE ENCOUNTER
Start on spironolactone 12.5 mg daily dose - please send the prescription.  Monitor for breast pain or swelling     Follow up in 4-5 weeks - please schedule appointment     Do the lab test 1-2 days prior to next visit - order BMP

## 2021-05-17 NOTE — TELEPHONE ENCOUNTER
Pt. Calling to find out if she needs to take more medication? Pt. States that she is still waiting to get a call back from the nurse. Pt. Was informed that we are waiting for a reply from the doctor.

## 2021-05-17 NOTE — TELEPHONE ENCOUNTER
Spoke with Dr. Brenda Link - will resume her on hydrochlorothiazide and do not start spironolactone - Dr. Brenda Link will discuss it with patient.     Staff - Please call pharmacy and retract order for spironolactone and update medlist

## 2021-05-17 NOTE — TELEPHONE ENCOUNTER
Pt states that she has been taking losartan potassium and her BP has been higher than normal - 186/93 today - no symptoms. Please call.

## 2021-05-17 NOTE — TELEPHONE ENCOUNTER
Pt came in to [facility]  states she failed her [address] at work due to her PC. Pt states she needs a note for work that she is being treated by Dr Betty Muller. Pt dropped of form with requirements. Placed in Colin Reyes. Please call when note completed.   Pt sta

## 2021-05-18 RX ORDER — LOSARTAN POTASSIUM 50 MG/1
50 TABLET ORAL DAILY
Qty: 30 TABLET | Refills: 2 | COMMUNITY
Start: 2021-05-18 | End: 2021-06-25

## 2021-05-18 RX ORDER — HYDROCHLOROTHIAZIDE 25 MG/1
25 TABLET ORAL DAILY
Qty: 30 TABLET | Refills: 2 | COMMUNITY
Start: 2021-05-18 | End: 2021-07-19

## 2021-05-18 NOTE — TELEPHONE ENCOUNTER
Patient called in and Dr. Elmer Montoya advice relayed. Patient will restart Hydrochlorothiazide and is aware not to start Spironolactone (she never picked it up) Medication list updated. Patient advised to call Dr. Demetra Cobian office to discuss further as charted in this note.

## 2021-05-18 NOTE — TELEPHONE ENCOUNTER
Spoke to patient, reviewed with her our discussion with Dr.Sher Tyson Corona yesterday not to start spironolactone, restart hydrochlorothiazide 25 mg daily, continue losartan 50 mg daily and atenolol, patient reports that she stressed her mother can go blind and requires surgery this week this is extra stress on her, she has mild headache on and off, yesterday blood pressure was elevated at the occupational health, advised patient not to drive for few days restart medication, she will be reevaluated on Saturday in my office, she agreed to the plan, I advised her that I do not think that this is going to be a permanent condition we will get her blood pressure down, she is a  and should not be driving with high blood pressure and symptoms. Will fax letter to her employer she will call us back with the name and fax number.

## 2021-05-18 NOTE — TELEPHONE ENCOUNTER
Pt called in to give fax # to job at # [phone] attention [address].  Please advise

## 2021-05-18 NOTE — TELEPHONE ENCOUNTER
Patient came to [facility] expecting to speak to Dr. Kat Snyder, but Dr. Kat Snyder was not in yet. Patient told me  left her a voicemail but did not explain any details.  She was very upset and stated that she needs to get back to work asap and doesn't know if she need

## 2021-05-19 ENCOUNTER — TELEPHONE (OUTPATIENT)
Dept: NEPHROLOGY | Facility: CLINIC | Age: 56
End: 2021-05-19

## 2021-05-22 ENCOUNTER — OFFICE VISIT (OUTPATIENT)
Dept: INTERNAL MEDICINE CLINIC | Facility: CLINIC | Age: 56
End: 2021-05-22
Payer: MEDICAID

## 2021-05-22 VITALS
BODY MASS INDEX: 39.16 KG/M2 | WEIGHT: 221 LBS | HEART RATE: 61 BPM | SYSTOLIC BLOOD PRESSURE: 137 MMHG | DIASTOLIC BLOOD PRESSURE: 84 MMHG | HEIGHT: 63 IN | RESPIRATION RATE: 18 BRPM

## 2021-05-22 DIAGNOSIS — M79.18 CERVICAL MYOFASCIAL PAIN SYNDROME: ICD-10-CM

## 2021-05-22 DIAGNOSIS — I10 ESSENTIAL HYPERTENSION: Primary | ICD-10-CM

## 2021-05-22 PROCEDURE — 99213 OFFICE O/P EST LOW 20 MIN: CPT | Performed by: INTERNAL MEDICINE

## 2021-05-22 PROCEDURE — 3075F SYST BP GE 130 - 139MM HG: CPT | Performed by: INTERNAL MEDICINE

## 2021-05-22 PROCEDURE — 3008F BODY MASS INDEX DOCD: CPT | Performed by: INTERNAL MEDICINE

## 2021-05-22 PROCEDURE — 3079F DIAST BP 80-89 MM HG: CPT | Performed by: INTERNAL MEDICINE

## 2021-05-22 RX ORDER — CYCLOBENZAPRINE HCL 10 MG
10 TABLET ORAL NIGHTLY
Qty: 30 TABLET | Refills: 0 | Status: SHIPPED | OUTPATIENT
Start: 2021-05-22 | End: 2021-06-21

## 2021-05-28 NOTE — PROGRESS NOTES
HPI/Subjective:   Patient ID: Moses Huang is a 60year old female.   Comes for follow-up on hypertension  HPI  Patient reports that she has been having less headache, feels better since she restarted taking diuretic, continues to take losartan 50 mg da continue current medications, asked patient to do BMP in 7 days  Cervical myofascial syndrome, patient will try cyclobenzaprine 10 mg at bedtime  Note for work completed may return to driving  Follow-up in 3 months  Meds This Visit:  Requested Prescription

## 2021-06-07 ENCOUNTER — LAB ENCOUNTER (OUTPATIENT)
Dept: LAB | Facility: HOSPITAL | Age: 56
End: 2021-06-07
Attending: INTERNAL MEDICINE
Payer: MEDICAID

## 2021-06-07 DIAGNOSIS — N18.2 CKD (CHRONIC KIDNEY DISEASE), STAGE II: ICD-10-CM

## 2021-06-07 DIAGNOSIS — R89.9 ABNORMAL LABORATORY TEST: ICD-10-CM

## 2021-06-07 PROCEDURE — 82550 ASSAY OF CK (CPK): CPT

## 2021-06-07 PROCEDURE — 36415 COLL VENOUS BLD VENIPUNCTURE: CPT

## 2021-06-07 PROCEDURE — 80048 BASIC METABOLIC PNL TOTAL CA: CPT

## 2021-06-08 ENCOUNTER — OFFICE VISIT (OUTPATIENT)
Dept: NEPHROLOGY | Facility: CLINIC | Age: 56
End: 2021-06-08
Payer: MEDICAID

## 2021-06-08 VITALS
DIASTOLIC BLOOD PRESSURE: 67 MMHG | BODY MASS INDEX: 39.93 KG/M2 | HEIGHT: 62 IN | SYSTOLIC BLOOD PRESSURE: 102 MMHG | WEIGHT: 217 LBS | HEART RATE: 68 BPM

## 2021-06-08 DIAGNOSIS — N18.2 CKD (CHRONIC KIDNEY DISEASE), STAGE II: Primary | ICD-10-CM

## 2021-06-08 DIAGNOSIS — I10 ESSENTIAL HYPERTENSION: ICD-10-CM

## 2021-06-08 PROCEDURE — 99214 OFFICE O/P EST MOD 30 MIN: CPT | Performed by: INTERNAL MEDICINE

## 2021-06-08 PROCEDURE — 3008F BODY MASS INDEX DOCD: CPT | Performed by: INTERNAL MEDICINE

## 2021-06-08 PROCEDURE — 3078F DIAST BP <80 MM HG: CPT | Performed by: INTERNAL MEDICINE

## 2021-06-08 PROCEDURE — 3074F SYST BP LT 130 MM HG: CPT | Performed by: INTERNAL MEDICINE

## 2021-06-08 NOTE — PROGRESS NOTES
Progress Note:      Patient is a 60 yrs old female with pmh of HTN, GERD, Fibromyalgia, CKD II-III who presented for follow up     Lab test showed BUN/Cr 22/1.15 mg/dl with an eGFR 61 ml/min - creatinine stable atleast since 2019.  Potassium at 3.4 and bi MG Oral Tab Take 1 tablet (10 mg total) by mouth nightly. 30 tablet 0   • losartan Potassium 50 MG Oral Tab Take 1 tablet (50 mg total) by mouth daily. 30 tablet 2   • hydrochlorothiazide 25 MG Oral Tab Take 1 tablet (25 mg total) by mouth daily.  30 tablet interaction        06/08/21  1427   BP: 102/67   Pulse: 68       PHYSICAL EXAM:   Constitutional: appears well hydrated alert and responsive no acute distress noted  Head/Face: normocephalic  Eyes/Vision: normal extraocular motion is intact  Nose/Mouth/Thr

## 2021-06-21 RX ORDER — CYCLOBENZAPRINE HCL 10 MG
TABLET ORAL
Qty: 90 TABLET | Refills: 0 | Status: SHIPPED | OUTPATIENT
Start: 2021-06-21

## 2021-06-25 ENCOUNTER — TELEPHONE (OUTPATIENT)
Dept: INTERNAL MEDICINE CLINIC | Facility: CLINIC | Age: 56
End: 2021-06-25

## 2021-06-25 RX ORDER — LOSARTAN POTASSIUM 50 MG/1
50 TABLET ORAL DAILY
Qty: 30 TABLET | Refills: 5 | Status: SHIPPED | OUTPATIENT
Start: 2021-06-25 | End: 2022-01-14

## 2021-06-25 RX ORDER — LOSARTAN POTASSIUM 50 MG/1
50 TABLET ORAL DAILY
Qty: 30 TABLET | Refills: 2 | Status: CANCELLED | OUTPATIENT
Start: 2021-06-25

## 2021-06-25 RX ORDER — LOSARTAN POTASSIUM 25 MG/1
TABLET ORAL
Qty: 90 TABLET | Refills: 1 | OUTPATIENT
Start: 2021-06-25

## 2021-06-25 NOTE — TELEPHONE ENCOUNTER
Last office note says patient is on Losartan 50 mg daily. Refill request was  for 25 mg daily. New prescription for 50 mg dose Refill pended and routed to Dr. Bora Varela.

## 2021-06-25 NOTE — TELEPHONE ENCOUNTER
Dr. Dick Mccauley, patient would like a refill on Losartan 50 mg. States dose was changed during office visit 05/14/2021.       Essential hypertension not at goal, advised patient to increase losartan take 50 mg daily report in 3 to 4 days to nephrology who will b

## 2021-07-19 RX ORDER — ATENOLOL 50 MG/1
TABLET ORAL
Qty: 90 TABLET | Refills: 0 | Status: SHIPPED | OUTPATIENT
Start: 2021-07-19 | End: 2021-11-03

## 2021-07-19 RX ORDER — HYDROCHLOROTHIAZIDE 25 MG/1
TABLET ORAL
Qty: 90 TABLET | Refills: 0 | Status: SHIPPED | OUTPATIENT
Start: 2021-07-19 | End: 2022-01-14

## 2021-09-08 NOTE — TELEPHONE ENCOUNTER
[employer] message sent to clarify. Patient's nephrologist discontinued this medication in May of 2021.

## 2021-09-11 ENCOUNTER — TELEPHONE (OUTPATIENT)
Dept: INTERNAL MEDICINE CLINIC | Facility: CLINIC | Age: 56
End: 2021-09-11

## 2021-09-11 RX ORDER — POTASSIUM CHLORIDE 750 MG/1
TABLET, EXTENDED RELEASE ORAL
Qty: 30 TABLET | Refills: 0 | OUTPATIENT
Start: 2021-09-11

## 2021-09-29 ENCOUNTER — OFFICE VISIT (OUTPATIENT)
Dept: INTERNAL MEDICINE CLINIC | Facility: CLINIC | Age: 56
End: 2021-09-29
Payer: MEDICAID

## 2021-09-29 ENCOUNTER — LAB ENCOUNTER (OUTPATIENT)
Dept: LAB | Age: 56
End: 2021-09-29
Attending: INTERNAL MEDICINE
Payer: MEDICAID

## 2021-09-29 VITALS
HEART RATE: 66 BPM | WEIGHT: 214 LBS | BODY MASS INDEX: 39.38 KG/M2 | SYSTOLIC BLOOD PRESSURE: 117 MMHG | DIASTOLIC BLOOD PRESSURE: 77 MMHG | HEIGHT: 62 IN

## 2021-09-29 DIAGNOSIS — Z12.83 SKIN CANCER SCREENING: ICD-10-CM

## 2021-09-29 DIAGNOSIS — R20.2 NUMBNESS AND TINGLING IN LEFT ARM: ICD-10-CM

## 2021-09-29 DIAGNOSIS — R20.0 NUMBNESS AND TINGLING IN LEFT ARM: ICD-10-CM

## 2021-09-29 DIAGNOSIS — R25.2 MUSCLE CRAMPING: ICD-10-CM

## 2021-09-29 DIAGNOSIS — I10 PRIMARY HYPERTENSION: ICD-10-CM

## 2021-09-29 DIAGNOSIS — L29.9 SCALP ITCH: ICD-10-CM

## 2021-09-29 DIAGNOSIS — I10 PRIMARY HYPERTENSION: Primary | ICD-10-CM

## 2021-09-29 PROCEDURE — 82550 ASSAY OF CK (CPK): CPT

## 2021-09-29 PROCEDURE — 3074F SYST BP LT 130 MM HG: CPT | Performed by: INTERNAL MEDICINE

## 2021-09-29 PROCEDURE — 3078F DIAST BP <80 MM HG: CPT | Performed by: INTERNAL MEDICINE

## 2021-09-29 PROCEDURE — 99214 OFFICE O/P EST MOD 30 MIN: CPT | Performed by: INTERNAL MEDICINE

## 2021-09-29 PROCEDURE — 80053 COMPREHEN METABOLIC PANEL: CPT

## 2021-09-29 PROCEDURE — 83735 ASSAY OF MAGNESIUM: CPT

## 2021-09-29 PROCEDURE — 85025 COMPLETE CBC W/AUTO DIFF WBC: CPT

## 2021-09-29 PROCEDURE — 3008F BODY MASS INDEX DOCD: CPT | Performed by: INTERNAL MEDICINE

## 2021-09-29 PROCEDURE — 36415 COLL VENOUS BLD VENIPUNCTURE: CPT

## 2021-09-29 RX ORDER — ERGOCALCIFEROL 1.25 MG/1
50000 CAPSULE ORAL WEEKLY
COMMUNITY
Start: 2021-06-17

## 2021-09-29 NOTE — PROGRESS NOTES
Subjective:   Patient ID: Robyn Benton is a 60year old female.   Presents for follow-up on hypertension, ongoing muscular cramping, numbness in the left arm    HPI  Patient continues to experience frequent cramping in both legs may affect her thigh johanna Constitutional:       General: She is not in acute distress. Appearance: Normal appearance.    HENT:      Right Ear: Tympanic membrane and ear canal normal.      Left Ear: Tympanic membrane and ear canal normal.   Eyes:      Extraocular Movements: Ext Kinase) (Not Creatinine)      Magnesium      Meds This Visit:  Requested Prescriptions      No prescriptions requested or ordered in this encounter       Imaging & Referrals:  DERM - INTERNAL  NEURO - INTERNAL

## 2021-10-21 ENCOUNTER — LAB ENCOUNTER (OUTPATIENT)
Dept: LAB | Facility: HOSPITAL | Age: 56
End: 2021-10-21
Attending: Other
Payer: MEDICAID

## 2021-10-21 ENCOUNTER — OFFICE VISIT (OUTPATIENT)
Dept: NEUROLOGY | Facility: CLINIC | Age: 56
End: 2021-10-21
Payer: MEDICAID

## 2021-10-21 VITALS
DIASTOLIC BLOOD PRESSURE: 32 MMHG | HEART RATE: 70 BPM | HEIGHT: 63 IN | SYSTOLIC BLOOD PRESSURE: 128 MMHG | BODY MASS INDEX: 38.09 KG/M2 | WEIGHT: 215 LBS

## 2021-10-21 DIAGNOSIS — R25.2 CRAMP OF LIMB: ICD-10-CM

## 2021-10-21 DIAGNOSIS — R25.2 CRAMPS, EXTREMITY: ICD-10-CM

## 2021-10-21 DIAGNOSIS — R20.2 PARESTHESIAS: ICD-10-CM

## 2021-10-21 DIAGNOSIS — G25.81 RLS (RESTLESS LEGS SYNDROME): ICD-10-CM

## 2021-10-21 DIAGNOSIS — R20.2 PARESTHESIA: Primary | ICD-10-CM

## 2021-10-21 DIAGNOSIS — R20.2 PARESTHESIAS: Primary | ICD-10-CM

## 2021-10-21 DIAGNOSIS — G25.81 RESTLESS LEGS SYNDROME (RLS): ICD-10-CM

## 2021-10-21 PROCEDURE — 84466 ASSAY OF TRANSFERRIN: CPT | Performed by: INTERNAL MEDICINE

## 2021-10-21 PROCEDURE — 99214 OFFICE O/P EST MOD 30 MIN: CPT | Performed by: OTHER

## 2021-10-21 PROCEDURE — 86255 FLUORESCENT ANTIBODY SCREEN: CPT

## 2021-10-21 PROCEDURE — 82390 ASSAY OF CERULOPLASMIN: CPT

## 2021-10-21 PROCEDURE — 82607 VITAMIN B-12: CPT | Performed by: INTERNAL MEDICINE

## 2021-10-21 PROCEDURE — 3074F SYST BP LT 130 MM HG: CPT | Performed by: OTHER

## 2021-10-21 PROCEDURE — 83516 IMMUNOASSAY NONANTIBODY: CPT

## 2021-10-21 PROCEDURE — 80048 BASIC METABOLIC PNL TOTAL CA: CPT | Performed by: INTERNAL MEDICINE

## 2021-10-21 PROCEDURE — 86618 LYME DISEASE ANTIBODY: CPT

## 2021-10-21 PROCEDURE — 84446 ASSAY OF VITAMIN E: CPT

## 2021-10-21 PROCEDURE — 82728 ASSAY OF FERRITIN: CPT | Performed by: INTERNAL MEDICINE

## 2021-10-21 PROCEDURE — 84207 ASSAY OF VITAMIN B-6: CPT

## 2021-10-21 PROCEDURE — 83540 ASSAY OF IRON: CPT | Performed by: INTERNAL MEDICINE

## 2021-10-21 PROCEDURE — 86341 ISLET CELL ANTIBODY: CPT

## 2021-10-21 PROCEDURE — 3078F DIAST BP <80 MM HG: CPT | Performed by: OTHER

## 2021-10-21 PROCEDURE — 83519 RIA NONANTIBODY: CPT

## 2021-10-21 PROCEDURE — 36415 COLL VENOUS BLD VENIPUNCTURE: CPT

## 2021-10-21 PROCEDURE — 82550 ASSAY OF CK (CPK): CPT | Performed by: INTERNAL MEDICINE

## 2021-10-21 PROCEDURE — 3008F BODY MASS INDEX DOCD: CPT | Performed by: OTHER

## 2021-10-21 PROCEDURE — 86780 TREPONEMA PALLIDUM: CPT | Performed by: OTHER

## 2021-10-21 RX ORDER — GABAPENTIN 300 MG/1
CAPSULE ORAL
Qty: 90 CAPSULE | Refills: 3 | Status: SHIPPED | OUTPATIENT
Start: 2021-10-21

## 2021-10-21 NOTE — PROGRESS NOTES
Neurology Initial Visit     Referred By: Dr. Emeka Acosta    Chief Complaint: Patient presents with:  Numbness: Pt presents for numbness in arms- mostly in fingertips and gets cramps in hands too. Cramping in legs- from thighs to toes.  Denies the numbness to b history:    Smoking status: Never Smoker   Smokeless tobacco: Never Used       Alcohol use Yes   Comment: once a week       Drug use: Unknown       Family History   Problem Relation Age of Onset   • Breast Cancer Paternal Aunt         in her 52's         C Neurological:     Mental Status- Alert and oriented x3.   Normal attention span and concentration  Thought process intact  Memory intact- recent and remote  Mood intact  Fund of knowledge appropriate for education and age    Language intact including: c of some return of carpal tunnel syndrome. Does not appear to be very related to the cramping episodes.   EMG of upper extremities on the right lower extremity will be done as well as some blood work look for any other treatable causes of neuropathy  - CERU medical attention immediately if symptoms worsen. Patient verbalized understanding of information given. All questions were answered. All side effects of drugs were discussed. Return to clinic in: Return in about 3 weeks (around 11/11/2021).     Jenise Nieves

## 2021-10-29 ENCOUNTER — TELEPHONE (OUTPATIENT)
Dept: NEUROLOGY | Facility: CLINIC | Age: 56
End: 2021-10-29

## 2021-10-29 NOTE — TELEPHONE ENCOUNTER
----- Message from Phil Al MD sent at 10/29/2021  9:34 AM CDT -----  Please let the patient know that results of these particular lab tests so far were normal.    Thank you

## 2021-11-03 RX ORDER — ATENOLOL 50 MG/1
50 TABLET ORAL DAILY
Qty: 90 TABLET | Refills: 1 | Status: SHIPPED | OUTPATIENT
Start: 2021-11-03

## 2021-11-03 NOTE — TELEPHONE ENCOUNTER
Refill passed per [facility] protocol    Requested Prescriptions   Pending Prescriptions Disp Refills    ATENOLOL 50 MG Oral Tab [Pharmacy Med Name: Atenolol 50 MG Oral Tablet] 90 tablet 0     Sig: Take 1 tablet by mouth once daily        Hypertensive

## 2021-12-01 ENCOUNTER — PROCEDURE VISIT (OUTPATIENT)
Dept: NEUROLOGY | Facility: CLINIC | Age: 56
End: 2021-12-01
Payer: MEDICAID

## 2021-12-01 DIAGNOSIS — G56.01 CARPAL TUNNEL SYNDROME OF RIGHT WRIST: ICD-10-CM

## 2021-12-01 DIAGNOSIS — G25.81 RLS (RESTLESS LEGS SYNDROME): ICD-10-CM

## 2021-12-01 DIAGNOSIS — R20.2 PARESTHESIAS: Primary | ICD-10-CM

## 2021-12-01 PROCEDURE — 95913 NRV CNDJ TEST 13/> STUDIES: CPT | Performed by: OTHER

## 2021-12-01 PROCEDURE — 95886 MUSC TEST DONE W/N TEST COMP: CPT | Performed by: OTHER

## 2021-12-01 NOTE — PROCEDURES
HISTORY:    Keon Camacho is a 60year old female with a complaint of numbness and tingling in the right hand, history of carpal tunnel surgery in the left hand. History of some paresthesias and cramping in her legs.     PHYSICAL:    Cranial nerves james needle part of the exam was normal as well.     Motor NCS      Nerve / Sites Muscle Latency Amplitude Rel Amp Durat Segments Distance Lat Diff Velocity     ms mV % ms  cm ms m/s   R Median - APB      Wrist APB 4.11 10.2 100 6.25 Wrist - APB 8        Elbow A Diff Velocity     ms ms µV µV  cm ms m/s   R Median - Ulnar Palmar      Median Wrist 2.50 3.18 41.6 29.2 Median - Wrist 8  32.0      Ulnar Wrist 1.41 2.08 17.2 2.9 Ulnar - Median  -1.09    L Median - Ulnar Palmar      Median Wrist 1.67 2.08 127.8 148.5 Med L5-S2 N None None None None Normal N N N N   R. Gastrocnemius (Medial head) Tibial S1-S2 N None None None None Normal N N N N   R. Tibialis anterior Deep peroneal (Fibular) L4-L5 N None None None None Normal N N N N   R.  Dorsal interossei (pedis) Medial pl

## 2021-12-20 ENCOUNTER — HOSPITAL ENCOUNTER (EMERGENCY)
Facility: HOSPITAL | Age: 56
Discharge: HOME | End: 2021-12-20
Attending: EMERGENCY MEDICINE
Payer: MEDICAID

## 2021-12-20 VITALS
BODY MASS INDEX: 38.09 KG/M2 | WEIGHT: 215 LBS | RESPIRATION RATE: 15 BRPM | TEMPERATURE: 97 F | DIASTOLIC BLOOD PRESSURE: 91 MMHG | HEART RATE: 62 BPM | HEIGHT: 63 IN | OXYGEN SATURATION: 99 % | SYSTOLIC BLOOD PRESSURE: 157 MMHG

## 2021-12-20 DIAGNOSIS — S39.012A LUMBAR STRAIN, INITIAL ENCOUNTER: Primary | ICD-10-CM

## 2021-12-20 PROCEDURE — 99283 EMERGENCY DEPT VISIT LOW MDM: CPT

## 2021-12-20 RX ORDER — CYCLOBENZAPRINE HCL 10 MG
10 TABLET ORAL 3 TIMES DAILY PRN
Qty: 20 TABLET | Refills: 0 | Status: SHIPPED | OUTPATIENT
Start: 2021-12-20 | End: 2021-12-27

## 2021-12-20 RX ORDER — TRAMADOL HYDROCHLORIDE 50 MG/1
50 TABLET ORAL EVERY 8 HOURS PRN
Qty: 14 TABLET | Refills: 0 | Status: SHIPPED | OUTPATIENT
Start: 2021-12-20

## 2021-12-20 NOTE — ED INITIAL ASSESSMENT (HPI)
Pt states she has had low back pain after picking up a crate a week ago. Pt states the pain improves when she takes Aleve.

## 2021-12-20 NOTE — ED PROVIDER NOTES
Patient Seen in: [facility] AND [facility] Emergency Department      History   Patient presents with:  Back Pain    Stated Complaint: Lower Back Pain    Subjective:   HPI    The patient is a 49-year-old female with a history of hypertension fibromyalgia who pres 60   Resp 15   Temp 97.4 °F (36.3 °C)   Temp src Temporal   SpO2 99 %   O2 Device None (Room air)       Current:BP (!) 157/91   Pulse 62   Temp 97.4 °F (36.3 °C) (Temporal)   Resp 15   Ht 160 cm (5' 3\")   Wt 97.5 kg   SpO2 99%   BMI 38.09 kg/m²         Ph Low back pain likely muscular no radicular symptoms         MDM                                   Disposition and Plan     Clinical Impression:  Lumbar strain, initial encounter  (primary encounter diagnosis)     Disposition:  Discharge  12/20/2021  7:44

## 2022-01-14 ENCOUNTER — TELEPHONE (OUTPATIENT)
Dept: NEPHROLOGY | Facility: CLINIC | Age: 57
End: 2022-01-14

## 2022-01-14 RX ORDER — LOSARTAN POTASSIUM 50 MG/1
TABLET ORAL
Qty: 30 TABLET | Refills: 0 | Status: SHIPPED | OUTPATIENT
Start: 2022-01-14 | End: 2022-02-07

## 2022-01-14 RX ORDER — HYDROCHLOROTHIAZIDE 25 MG/1
25 TABLET ORAL DAILY
Qty: 90 TABLET | Refills: 1 | Status: SHIPPED | OUTPATIENT
Start: 2022-01-14 | End: 2022-10-10

## 2022-01-14 NOTE — TELEPHONE ENCOUNTER
LOV  6/8/21  RTC  6 Months  F/U  No schedule  FYI  Patient have appointment today was cancel patient want in person.

## 2022-01-14 NOTE — TELEPHONE ENCOUNTER
Patient was called - state she prefers in person visit and would rather come in .  Patient was instructed to call and schedule appointment    Please cancel todays appointment

## 2022-01-14 NOTE — TELEPHONE ENCOUNTER
Refill passed per [facility], [facility] protocol. Requested Prescriptions   Pending Prescriptions Disp Refills    HYDROCHLOROTHIAZIDE 25 MG Oral Tab [Pharmacy Med Name: hydroCHLOROthiazide 25 MG Oral Tablet] 90 tablet 0     Sig: Take 1 tablet by mouth once daily        Hypertensive Medications Protocol Passed - 1/14/2022 10:24 AM        Passed - CMP or BMP in past 12 months        Passed - Appointment in past 6 or next 3 months        Passed - GFR  > 50     Lab Results   Component Value Date    GFRAA 71 10/21/2021                        Future Appointments         Provider Department Appt Notes    In 1 month Jocelyn Sage MD [address] [address] f/u in 2 mo.             Recent Outpatient Visits              2 months ago [address] Jocelyn Sage MD    Office Visit    3 months ago Primary hypertension    Irma Vernon [place], MD    Office Visit    7 months ago CKD (chronic kidney disease), Darryl Jones Gretta Keens, MD    Office Visit    7 months ago Essential hypertension    Irma Vernon [place], MD    Office Visit    8 months ago Numbness and tingling in left arm    Araseli Vernon MD    Office Visit

## 2022-02-07 RX ORDER — LOSARTAN POTASSIUM 50 MG/1
50 TABLET ORAL DAILY
Qty: 30 TABLET | Refills: 0 | Status: SHIPPED | OUTPATIENT
Start: 2022-02-07 | End: 2022-03-14

## 2022-02-09 ENCOUNTER — TELEPHONE (OUTPATIENT)
Dept: NEUROLOGY | Facility: CLINIC | Age: 57
End: 2022-02-09

## 2022-02-16 ENCOUNTER — OFFICE VISIT (OUTPATIENT)
Dept: DERMATOLOGY CLINIC | Facility: CLINIC | Age: 57
End: 2022-02-16
Payer: MEDICAID

## 2022-02-16 DIAGNOSIS — L30.9 DERMATITIS: ICD-10-CM

## 2022-02-16 DIAGNOSIS — L21.9 SEBORRHEIC DERMATITIS: Primary | ICD-10-CM

## 2022-02-16 PROCEDURE — 99213 OFFICE O/P EST LOW 20 MIN: CPT | Performed by: PHYSICIAN ASSISTANT

## 2022-02-16 RX ORDER — KETOCONAZOLE 20 MG/G
CREAM TOPICAL
Qty: 30 G | Refills: 1 | Status: SHIPPED | OUTPATIENT
Start: 2022-02-16

## 2022-02-16 RX ORDER — KETOCONAZOLE 20 MG/ML
SHAMPOO TOPICAL
Qty: 120 ML | Refills: 3 | Status: SHIPPED | OUTPATIENT
Start: 2022-02-16

## 2022-03-14 ENCOUNTER — OFFICE VISIT (OUTPATIENT)
Dept: NEPHROLOGY | Facility: CLINIC | Age: 57
End: 2022-03-14
Payer: MEDICAID

## 2022-03-14 ENCOUNTER — LAB ENCOUNTER (OUTPATIENT)
Dept: LAB | Facility: HOSPITAL | Age: 57
End: 2022-03-14
Attending: INTERNAL MEDICINE
Payer: MEDICAID

## 2022-03-14 VITALS
DIASTOLIC BLOOD PRESSURE: 69 MMHG | BODY MASS INDEX: 37.92 KG/M2 | SYSTOLIC BLOOD PRESSURE: 124 MMHG | HEIGHT: 63 IN | WEIGHT: 214 LBS | HEART RATE: 54 BPM

## 2022-03-14 DIAGNOSIS — I10 ESSENTIAL HYPERTENSION: ICD-10-CM

## 2022-03-14 DIAGNOSIS — N18.2 CKD (CHRONIC KIDNEY DISEASE), STAGE II: Primary | ICD-10-CM

## 2022-03-14 PROCEDURE — 80048 BASIC METABOLIC PNL TOTAL CA: CPT | Performed by: INTERNAL MEDICINE

## 2022-03-14 PROCEDURE — 99214 OFFICE O/P EST MOD 30 MIN: CPT | Performed by: INTERNAL MEDICINE

## 2022-03-14 PROCEDURE — 3078F DIAST BP <80 MM HG: CPT | Performed by: INTERNAL MEDICINE

## 2022-03-14 PROCEDURE — 36415 COLL VENOUS BLD VENIPUNCTURE: CPT | Performed by: INTERNAL MEDICINE

## 2022-03-14 PROCEDURE — 82550 ASSAY OF CK (CPK): CPT | Performed by: INTERNAL MEDICINE

## 2022-03-14 PROCEDURE — 3074F SYST BP LT 130 MM HG: CPT | Performed by: INTERNAL MEDICINE

## 2022-03-14 PROCEDURE — 3008F BODY MASS INDEX DOCD: CPT | Performed by: INTERNAL MEDICINE

## 2022-03-14 RX ORDER — LOSARTAN POTASSIUM 50 MG/1
50 TABLET ORAL DAILY
Qty: 90 TABLET | Refills: 1 | Status: SHIPPED | OUTPATIENT
Start: 2022-03-14

## 2022-03-28 ENCOUNTER — TELEPHONE (OUTPATIENT)
Dept: INTERNAL MEDICINE CLINIC | Facility: CLINIC | Age: 57
End: 2022-03-28

## 2022-03-29 NOTE — TELEPHONE ENCOUNTER
Spoke with pt,  verified. Pt stated she is looking for screening mammogram order, she denies any breast issue. Order placed per protocol.              Future Appointments   Date Time Provider Mike Allen   2022  9:50 AM Shweta Mortensen MD [facility]

## 2022-04-27 ENCOUNTER — HOSPITAL ENCOUNTER (OUTPATIENT)
Dept: MAMMOGRAPHY | Facility: HOSPITAL | Age: 57
Discharge: HOME | End: 2022-04-27
Attending: INTERNAL MEDICINE
Payer: MEDICAID

## 2022-04-27 DIAGNOSIS — Z12.31 SCREENING MAMMOGRAM FOR BREAST CANCER: ICD-10-CM

## 2022-04-27 PROCEDURE — 77063 BREAST TOMOSYNTHESIS BI: CPT | Performed by: INTERNAL MEDICINE

## 2022-04-27 PROCEDURE — 77067 SCR MAMMO BI INCL CAD: CPT | Performed by: INTERNAL MEDICINE

## 2022-05-20 RX ORDER — ATENOLOL 50 MG/1
50 TABLET ORAL DAILY
Qty: 90 TABLET | Refills: 0 | Status: SHIPPED | OUTPATIENT
Start: 2022-05-20

## 2022-05-20 NOTE — TELEPHONE ENCOUNTER
Refill passed per [facility] protocol. 90 day refill given on 05/20/22, appointment needed for further refills.     Requested Prescriptions   Pending Prescriptions Disp Refills    ATENOLOL 50 MG Oral Tab [Pharmacy Med Name: Atenolol 50 MG Oral Tablet] 90 tablet 0     Sig: Take 1 tablet by mouth once daily        Hypertensive Medications Protocol Failed - 5/20/2022 12:26 PM        Failed - Appointment in past 6 or next 3 months        Passed - CMP or BMP in past 12 months        Passed - GFR  > 50     Lab Results   Component Value Date    GFRAA 60 03/14/2022                        Recent Outpatient Visits              2 months ago CKD (chronic kidney disease), stage II    Darryl Noguera Gretta Keens, MD    Office Visit    3 months ago [facility] Patrice Garcia PA-C    Office Visit    7 months ago [address] [place] [address] Jocelyn Sage MD    Office Visit    7 months ago Primary hypertension    [facility], [address] [place] [address] Izabella Alvarado MD    Office Visit    11 months ago CKD (chronic kidney disease), stage II    Darryl Noguera Gretta Keens, MD    Office Visit            Future Appointments         Provider Department Appt Notes    In 3 months Carrie Hidalgo MD [facility], [facility], [facility] 6 months

## 2022-08-15 ENCOUNTER — LAB ENCOUNTER (OUTPATIENT)
Dept: LAB | Age: 57
End: 2022-08-15
Attending: INTERNAL MEDICINE
Payer: MEDICAID

## 2022-08-15 ENCOUNTER — OFFICE VISIT (OUTPATIENT)
Dept: INTERNAL MEDICINE CLINIC | Facility: CLINIC | Age: 57
End: 2022-08-15
Payer: MEDICAID

## 2022-08-15 VITALS
HEIGHT: 63 IN | SYSTOLIC BLOOD PRESSURE: 140 MMHG | WEIGHT: 215 LBS | DIASTOLIC BLOOD PRESSURE: 90 MMHG | BODY MASS INDEX: 38.09 KG/M2 | HEART RATE: 78 BPM

## 2022-08-15 DIAGNOSIS — R25.2 MUSCLE CRAMPING: ICD-10-CM

## 2022-08-15 DIAGNOSIS — N76.0 BV (BACTERIAL VAGINOSIS): ICD-10-CM

## 2022-08-15 DIAGNOSIS — N64.4 BREAST PAIN, RIGHT: Primary | ICD-10-CM

## 2022-08-15 DIAGNOSIS — B96.89 BV (BACTERIAL VAGINOSIS): ICD-10-CM

## 2022-08-15 DIAGNOSIS — R10.13 POSTPRANDIAL EPIGASTRIC PAIN: ICD-10-CM

## 2022-08-15 DIAGNOSIS — G56.01 CARPAL TUNNEL SYNDROME ON RIGHT: ICD-10-CM

## 2022-08-15 LAB
ALBUMIN SERPL-MCNC: 3.3 G/DL (ref 3.4–5)
ALBUMIN/GLOB SERPL: 0.8 {RATIO} (ref 1–2)
ALP LIVER SERPL-CCNC: 120 U/L
ALT SERPL-CCNC: 28 U/L
ANION GAP SERPL CALC-SCNC: 3 MMOL/L (ref 0–18)
AST SERPL-CCNC: 19 U/L (ref 15–37)
BASOPHILS # BLD AUTO: 0.02 X10(3) UL (ref 0–0.2)
BASOPHILS NFR BLD AUTO: 0.4 %
BILIRUB SERPL-MCNC: 0.3 MG/DL (ref 0.1–2)
BUN BLD-MCNC: 20 MG/DL (ref 7–18)
BUN/CREAT SERPL: 16.9 (ref 10–20)
CALCIUM BLD-MCNC: 9.5 MG/DL (ref 8.5–10.1)
CHLORIDE SERPL-SCNC: 105 MMOL/L (ref 98–112)
CK SERPL-CCNC: 147 U/L
CO2 SERPL-SCNC: 30 MMOL/L (ref 21–32)
CREAT BLD-MCNC: 1.18 MG/DL
DEPRECATED RDW RBC AUTO: 40.3 FL (ref 35.1–46.3)
EOSINOPHIL # BLD AUTO: 0 X10(3) UL (ref 0–0.7)
EOSINOPHIL NFR BLD AUTO: 0 %
ERYTHROCYTE [DISTWIDTH] IN BLOOD BY AUTOMATED COUNT: 13.8 % (ref 11–15)
FASTING STATUS PATIENT QL REPORTED: YES
GFR SERPLBLD BASED ON 1.73 SQ M-ARVRAT: 53 ML/MIN/1.73M2 (ref 60–?)
GLOBULIN PLAS-MCNC: 4.2 G/DL (ref 2.8–4.4)
GLUCOSE BLD-MCNC: 104 MG/DL (ref 70–99)
HCT VFR BLD AUTO: 45 %
HGB BLD-MCNC: 14 G/DL
IMM GRANULOCYTES # BLD AUTO: 0.01 X10(3) UL (ref 0–1)
IMM GRANULOCYTES NFR BLD: 0.2 %
LYMPHOCYTES # BLD AUTO: 1.98 X10(3) UL (ref 1–4)
LYMPHOCYTES NFR BLD AUTO: 39.4 %
MAGNESIUM SERPL-MCNC: 2.1 MG/DL (ref 1.6–2.6)
MCH RBC QN AUTO: 25.2 PG (ref 26–34)
MCHC RBC AUTO-ENTMCNC: 31.1 G/DL (ref 31–37)
MCV RBC AUTO: 81.1 FL
MONOCYTES # BLD AUTO: 0.47 X10(3) UL (ref 0.1–1)
MONOCYTES NFR BLD AUTO: 9.3 %
NEUTROPHILS # BLD AUTO: 2.55 X10 (3) UL (ref 1.5–7.7)
NEUTROPHILS # BLD AUTO: 2.55 X10(3) UL (ref 1.5–7.7)
NEUTROPHILS NFR BLD AUTO: 50.7 %
OSMOLALITY SERPL CALC.SUM OF ELEC: 289 MOSM/KG (ref 275–295)
PLATELET # BLD AUTO: 229 10(3)UL (ref 150–450)
POTASSIUM SERPL-SCNC: 4.1 MMOL/L (ref 3.5–5.1)
PROT SERPL-MCNC: 7.5 G/DL (ref 6.4–8.2)
RBC # BLD AUTO: 5.55 X10(6)UL
SODIUM SERPL-SCNC: 138 MMOL/L (ref 136–145)
WBC # BLD AUTO: 5 X10(3) UL (ref 4–11)

## 2022-08-15 PROCEDURE — 3077F SYST BP >= 140 MM HG: CPT | Performed by: INTERNAL MEDICINE

## 2022-08-15 PROCEDURE — 36415 COLL VENOUS BLD VENIPUNCTURE: CPT | Performed by: INTERNAL MEDICINE

## 2022-08-15 PROCEDURE — 85025 COMPLETE CBC W/AUTO DIFF WBC: CPT | Performed by: INTERNAL MEDICINE

## 2022-08-15 PROCEDURE — 3080F DIAST BP >= 90 MM HG: CPT | Performed by: INTERNAL MEDICINE

## 2022-08-15 PROCEDURE — 99214 OFFICE O/P EST MOD 30 MIN: CPT | Performed by: INTERNAL MEDICINE

## 2022-08-15 PROCEDURE — 82550 ASSAY OF CK (CPK): CPT | Performed by: INTERNAL MEDICINE

## 2022-08-15 PROCEDURE — 3008F BODY MASS INDEX DOCD: CPT | Performed by: INTERNAL MEDICINE

## 2022-08-15 PROCEDURE — 83735 ASSAY OF MAGNESIUM: CPT | Performed by: INTERNAL MEDICINE

## 2022-08-15 PROCEDURE — 80053 COMPREHEN METABOLIC PANEL: CPT | Performed by: INTERNAL MEDICINE

## 2022-08-18 LAB
GENITAL VAGINOSIS SCREEN: NEGATIVE
TRICHOMONAS SCREEN: NEGATIVE

## 2022-08-24 ENCOUNTER — TELEMEDICINE (OUTPATIENT)
Dept: INTERNAL MEDICINE CLINIC | Facility: CLINIC | Age: 57
End: 2022-08-24
Payer: MEDICAID

## 2022-08-24 ENCOUNTER — LAB ENCOUNTER (OUTPATIENT)
Dept: LAB | Age: 57
End: 2022-08-24
Attending: NURSE PRACTITIONER
Payer: MEDICAID

## 2022-08-24 DIAGNOSIS — J06.9 URI WITH COUGH AND CONGESTION: ICD-10-CM

## 2022-08-24 DIAGNOSIS — J06.9 URI WITH COUGH AND CONGESTION: Primary | ICD-10-CM

## 2022-08-24 PROCEDURE — 99441 PHONE E/M BY PHYS 5-10 MIN: CPT | Performed by: NURSE PRACTITIONER

## 2022-08-25 ENCOUNTER — TELEPHONE (OUTPATIENT)
Dept: INTERNAL MEDICINE CLINIC | Facility: CLINIC | Age: 57
End: 2022-08-25

## 2022-08-25 LAB — SARS-COV-2 RNA RESP QL NAA+PROBE: DETECTED

## 2022-08-25 NOTE — TELEPHONE ENCOUNTER
Patient called. She states she received mychart test results; positive for covid. She feels the same, has runny nose, congestion and cough. Went over OTC supportive care with patient. Hydrate, teas. Quarantine. Can use theraflu for symptoms relief. We discussed anti-viral criteria and side effects. Patient was not interested in anti-viral at this time. Patient to call back if any new symptoms or worsening symptoms.

## 2022-09-06 RX ORDER — KETOCONAZOLE 20 MG/ML
SHAMPOO TOPICAL
Qty: 120 ML | Refills: 0 | Status: SHIPPED | OUTPATIENT
Start: 2022-09-06

## 2022-09-06 RX ORDER — KETOCONAZOLE 20 MG/G
CREAM TOPICAL
Qty: 30 G | Refills: 0 | Status: SHIPPED | OUTPATIENT
Start: 2022-09-06

## 2022-09-07 RX ORDER — CYCLOBENZAPRINE HCL 10 MG
TABLET ORAL
Qty: 90 TABLET | Refills: 0 | Status: SHIPPED | OUTPATIENT
Start: 2022-09-07

## 2022-09-07 RX ORDER — ATENOLOL 50 MG/1
50 TABLET ORAL DAILY
Qty: 90 TABLET | Refills: 3 | Status: SHIPPED | OUTPATIENT
Start: 2022-09-07 | End: 2023-09-29

## 2022-09-07 NOTE — TELEPHONE ENCOUNTER
Protocol failed or has No Protocol, please review  Requested Prescriptions   Pending Prescriptions Disp Refills    CYCLOBENZAPRINE 10 MG Oral Tab [Pharmacy Med Name: Cyclobenzaprine HCl 10 MG Oral Tablet] 90 tablet 0     Sig: Take 1 tablet by mouth nightly        There is no refill protocol information for this order        ATENOLOL 50 MG Oral Tab [Pharmacy Med Name: Atenolol 50 MG Oral Tablet] 90 tablet 0     Sig: TAKE 1 TABLET BY MOUTH ONCE DAILY .  APPOINTMENT REQUIRED FOR FUTURE REFILLS        Hypertensive Medications Protocol Failed - 9/4/2022 12:58 PM        Failed - Last BP reading less than 140/90     BP Readings from Last 1 Encounters:  08/15/22 : 140/90                Passed - In person appointment in the past 12 or next 3 months       Recent Outpatient Visits              2 weeks ago URI with cough and congestion    [place] [facility], [address], [place], [place], GABY    Telemedicine    3 weeks ago Breast pain, right    [place] [employer] [employer] [facility], [address] [facility], Izabella Reyes MD    Office Visit    5 months ago CKD (chronic kidney disease), stage II    [place] [facility], [address] [facility], Linh Andrews MD    Office Visit    6 months ago Seborrheic dermatitis    [facility] Josué Benavides PA-C    Office Visit    10 months ago [address] Bharati Martin MD    Office Visit     Future Appointments         Provider Department Appt Notes    Tomorrow [facility] [address] for Health covid positive 8/24, gave hotline #, order in epic, ag    In 5 days Jef Sharma MD [place] [facility], [facility] 6 months    In 2 months Michael Rico, [address] [facility], [address] stomach issues -- 9/1/22 [facility] sent location change - AC               Passed - CMP or BMP in past 6 months     Recent Results (from the past 4392 hour(s))   COMP METABOLIC PANEL (14)    Collection Time: 08/15/22 10:35 AM   Result Value Ref Range    Glucose 104 (H) 70 - 99 mg/dL    Sodium 138 136 - 145 mmol/L    Potassium 4.1 3.5 - 5.1 mmol/L    Chloride 105 98 - 112 mmol/L    CO2 30.0 21.0 - 32.0 mmol/L    Anion Gap 3 0 - 18 mmol/L    BUN 20 (H) 7 - 18 mg/dL    Creatinine 1.18 (H) 0.55 - 1.02 mg/dL    BUN/CREA Ratio 16.9 10.0 - 20.0    Calcium, Total 9.5 8.5 - 10.1 mg/dL    Calculated Osmolality 289 275 - 295 mOsm/kg    eGFR-Cr 53 (L) >=60 mL/min/1.73m2    ALT 28 13 - 56 U/L    AST 19 15 - 37 U/L    Alkaline Phosphatase 120 (H) 46 - 118 U/L    Bilirubin, Total 0.3 0.1 - 2.0 mg/dL    Total Protein 7.5 6.4 - 8.2 g/dL    Albumin 3.3 (L) 3.4 - 5.0 g/dL    Globulin  4.2 2.8 - 4.4 g/dL    A/G Ratio 0.8 (L) 1.0 - 2.0    Patient Fasting for CMP? Yes      *Note: Due to a large number of results and/or encounters for the requested time period, some results have not been displayed. A complete set of results can be found in Results Review.                  Passed - In person appointment or virtual visit in the past 6 months       Recent Outpatient Visits              2 weeks ago URI with cough and congestion    [facility], [address] [facility], [facility]    Telemedicine    3 weeks ago Breast pain, right    [facility], [address] [facility], Izabella Richardson MD    Office Visit    5 months ago CKD (chronic kidney disease), stage II    [facility], [address] [facility], Jannette Andrews MD    Office Visit    6 months ago [facility] Carlos Sousa PA-C    Office Visit    10 months ago [address] Jeff Ley MD    Office Visit     Future Appointments         Provider Department Appt Notes    Tomorrow [facility] THE [address] covid positive 8/24, gave hotline #, order in epic, ag    In 5 days Clotmariano Salcedo MD [facility], [address] [place] [address] 6 months In 2 months Tammy Postal, [address], [address] stomach issues -- 9/1/22  sent location change - AC               Passed - GFR > 50     Lab Results   Component Value Date    EGFRCR 53 (L) 08/15/2022                     Future Appointments         Provider Department Appt Notes    Tomorrow [facility] SYSTEM OF THE [address] for Health covid positive 8/24, gave hotline #, order in epic, ag    In 5 days Kelly Kong MD [address] [facility], 602 [facility] 6 months    In 2 months Tammy Postal, [address], [address] stomach issues -- 9/1/22 [facility] sent location change [facility]          Recent Outpatient Visits              2 weeks ago URI with cough and congestion    [address] [facility], [address] [facility]    [facility]icine    3 weeks ago Breast pain, right    Paulo Mendez [place], MD    Office Visit    5 months ago CKD (chronic kidney disease), stage II    Darryl Lee, Moisés Oliva MD    Office Visit    6 months ago [address] Dermatology Vicky Reich PA-C    Office Visit    10 months ago [address] [place] [address] Carlton Quijano MD    Office Visit

## 2022-09-08 ENCOUNTER — HOSPITAL ENCOUNTER (OUTPATIENT)
Dept: ULTRASOUND IMAGING | Facility: HOSPITAL | Age: 57
Discharge: HOME | End: 2022-09-08
Attending: INTERNAL MEDICINE
Payer: MEDICAID

## 2022-09-08 ENCOUNTER — HOSPITAL ENCOUNTER (OUTPATIENT)
Dept: MAMMOGRAPHY | Facility: HOSPITAL | Age: 57
Discharge: HOME | End: 2022-09-08
Attending: INTERNAL MEDICINE
Payer: MEDICAID

## 2022-09-08 DIAGNOSIS — N64.4 BREAST PAIN, RIGHT: ICD-10-CM

## 2022-09-08 PROCEDURE — 77065 DX MAMMO INCL CAD UNI: CPT | Performed by: INTERNAL MEDICINE

## 2022-09-08 PROCEDURE — 77061 BREAST TOMOSYNTHESIS UNI: CPT | Performed by: INTERNAL MEDICINE

## 2022-09-08 PROCEDURE — 76642 ULTRASOUND BREAST LIMITED: CPT | Performed by: INTERNAL MEDICINE

## 2022-09-12 ENCOUNTER — OFFICE VISIT (OUTPATIENT)
Dept: NEPHROLOGY | Facility: CLINIC | Age: 57
End: 2022-09-12
Payer: MEDICAID

## 2022-09-12 VITALS
HEIGHT: 63 IN | DIASTOLIC BLOOD PRESSURE: 85 MMHG | WEIGHT: 217 LBS | SYSTOLIC BLOOD PRESSURE: 125 MMHG | HEART RATE: 67 BPM | BODY MASS INDEX: 38.45 KG/M2

## 2022-09-12 DIAGNOSIS — N18.2 CKD (CHRONIC KIDNEY DISEASE), STAGE II: Primary | ICD-10-CM

## 2022-09-12 PROCEDURE — 99214 OFFICE O/P EST MOD 30 MIN: CPT | Performed by: INTERNAL MEDICINE

## 2022-09-12 PROCEDURE — 3008F BODY MASS INDEX DOCD: CPT | Performed by: INTERNAL MEDICINE

## 2022-09-12 PROCEDURE — 3074F SYST BP LT 130 MM HG: CPT | Performed by: INTERNAL MEDICINE

## 2022-09-12 PROCEDURE — 3079F DIAST BP 80-89 MM HG: CPT | Performed by: INTERNAL MEDICINE

## 2022-10-10 RX ORDER — HYDROCHLOROTHIAZIDE 25 MG/1
25 TABLET ORAL DAILY
Qty: 90 TABLET | Refills: 1 | Status: SHIPPED | OUTPATIENT
Start: 2022-10-10

## 2022-10-10 NOTE — TELEPHONE ENCOUNTER
Refill passed per "[employer] [facility] protocol.   Requested Prescriptions   Pending Prescriptions Disp Refills    HYDROCHLOROTHIAZIDE 25 MG Oral Tab [Pharmacy Med Name: hydroCHLOROthiazide 25 MG Oral Tablet] 90 tablet 0     Sig: Take 1 tablet by mouth once daily        Hypertensive Medications Protocol Passed - 10/9/2022  1:21 PM        Passed - In person appointment in the past 12 or next 3 months       Recent Outpatient Visits              4 weeks ago CKD (chronic kidney disease), stage II    [facility], [address] [place] [address], Mariela Andrews MD    Office Visit    1 month ago URI with cough and congestion    [place] [employer] [facility], [address] [facility] [place], [address] [facility], [facility]    Telemedicine    1 month ago Breast pain, right    [place] [employer] [facility], 12 [facility]    Office Visit    7 months ago CKD (chronic kidney disease), stage II    [facility], [address] [place] [address], Mariela Andrews MD    Office Visit    7 months ago Seborrheic dermatitis    [facility] Keesha Valverde PA-C    Office Visit     Future Appointments         Provider Department Appt Notes    In 4 weeks Monica Cedeno, 137 [facility], 59 [facility] stomach issues -- 9/1/22 [address] sent location change - AC    In 1 month Ericka Peñaloza MD [facility] Consult per IB    In 5 months Mariela Albert MD [place] [facility], [address] [place] [address] 6 mos               Passed - Last BP reading less than 140/90     BP Readings from Last 1 Encounters:  09/12/22 : 125/85                Passed - CMP or BMP in past 6 months     Recent Results (from the past 4392 hour(s))   COMP METABOLIC PANEL (14)    Collection Time: 08/15/22 10:35 AM   Result Value Ref Range    Glucose 104 (H) 70 - 99 mg/dL    Sodium 138 136 - 145 mmol/L    Potassium 4.1 3.5 - 5.1 mmol/L    Chloride 105 98 - 112 mmol/L    CO2 30.0 21.0 - 32.0 mmol/L    Anion Gap 3 0 - 18 mmol/L    BUN 20 (H) 7 - 18 mg/dL    Creatinine 1.18 (H) 0.55 - 1.02 mg/dL    BUN/CREA Ratio 16.9 10.0 - 20.0    Calcium, Total 9.5 8.5 - 10.1 mg/dL    Calculated Osmolality 289 275 - 295 mOsm/kg    eGFR-Cr 53 (L) >=60 mL/min/1.73m2    ALT 28 13 - 56 U/L    AST 19 15 - 37 U/L    Alkaline Phosphatase 120 (H) 46 - 118 U/L    Bilirubin, Total 0.3 0.1 - 2.0 mg/dL    Total Protein 7.5 6.4 - 8.2 g/dL    Albumin 3.3 (L) 3.4 - 5.0 g/dL    Globulin  4.2 2.8 - 4.4 g/dL    A/G Ratio 0.8 (L) 1.0 - 2.0    Patient Fasting for CMP? Yes      *Note: Due to a large number of results and/or encounters for the requested time period, some results have not been displayed. A complete set of results can be found in Results Review.                  Passed - In person appointment or virtual visit in the past 6 months       Recent Outpatient Visits              4 weeks ago CKD (chronic kidney disease), stage II    [facility], [address] [place] [address], Justin Andrews MD    Office Visit    1 month ago URI with cough and congestion    [address] [facility], [address] [facility]    Telemedicine    1 month ago Breast pain, right    [address] [facility], 12 [facility]    Office Visit    7 months ago CKD (chronic kidney disease), stage II    [address] [facility], [address] [place] [address], Justin Andrews MD    Office Visit    7 months ago [address] Dermatology Aida Joiner PA-C    Office Visit     Future Appointments         Provider Department Appt Notes    In 4 weeks Kai Anne, [address] [facility] [address] [facility] [address], [address] [facility] [address] stomach issues -- 9/1/22 [facility] sent location change - AC    In 1 month Jacob Harris MD [address] [facility] [address] per IB    In 5 months Jeniffer Justin Gaston MD [address] [facility], [address] [place], [place] [address] 6 mos               Passed - [facility] or GFRAA > 50     GFR Evaluation  EGFRCR: 48 , resulted on 8/15/2022                Recent Outpatient Visits 4 weeks ago CKD (chronic kidney disease), stage II    [facility], [address] [facility], Timothy Andrews MD    Office Visit    1 month ago URI with cough and congestion    [facility] [facility], [facility]    Telemedicine    1 month ago Breast pain, right    [address] [facility], [address] [facility], [facility]raw[facility]    Office Visit    7 months ago CKD (chronic kidney disease), stage II    [address] [facility], [address] [facility], Timothy Andrews MD    Office Visit    7 months ago Seborrheic dermatitis    [facility] Alexandrea Tatum PA-C    Office Visit          Future Appointments         Provider Department Appt Notes    In 4 weeks Marylu Huitron, 137 [facility], 59 [facility] stomach issues -- 9/1/22 [facility] sent location change - AC    In 1 month Cate Milan MD [address] per IB    In 5 months Timothy Arnold MD [address] [facility], [address] [facility], [place] 6 mos

## 2022-10-12 ENCOUNTER — TELEPHONE (OUTPATIENT)
Dept: INTERNAL MEDICINE CLINIC | Facility: CLINIC | Age: 57
End: 2022-10-12

## 2022-10-12 NOTE — TELEPHONE ENCOUNTER
Patient reports she has coughing and wheezing since she had COVID 1 month ago. Afebrile. Patient states symptoms stable and she would like to see Dr. Carlos George only. Offered next available on 10/18 but scheduled for the following appointment due to patient availability. Future Appointments   Date Time Provider Mike Allen   10/21/2022 11:40 AM Mack Dale MD [facility] [place]   11/7/2022  9:00 AM GABY [facility]   11/11/2022 11:30 AM Carmen Smith MD [facility]   3/13/2023  9:50 AM Lokesh Pate MD [facility] Amisha CANCINO     Reviewed emergency symptoms and when patient should be seen sooner in ER/ICC.

## 2022-10-13 RX ORDER — LOSARTAN POTASSIUM 50 MG/1
50 TABLET ORAL DAILY
Qty: 90 TABLET | Refills: 1 | Status: SHIPPED | OUTPATIENT
Start: 2022-10-13 | End: 2023-05-12

## 2022-10-21 ENCOUNTER — OFFICE VISIT (OUTPATIENT)
Dept: INTERNAL MEDICINE CLINIC | Facility: CLINIC | Age: 57
End: 2022-10-21
Payer: MEDICAID

## 2022-10-21 VITALS
OXYGEN SATURATION: 99 % | WEIGHT: 214 LBS | HEIGHT: 63 IN | SYSTOLIC BLOOD PRESSURE: 138 MMHG | BODY MASS INDEX: 37.92 KG/M2 | DIASTOLIC BLOOD PRESSURE: 88 MMHG | HEART RATE: 84 BPM

## 2022-10-21 DIAGNOSIS — R06.2 WHEEZING: Primary | ICD-10-CM

## 2022-10-21 DIAGNOSIS — N76.4 ABSCESS OF RIGHT GENITAL LABIA: ICD-10-CM

## 2022-10-21 PROCEDURE — 3079F DIAST BP 80-89 MM HG: CPT | Performed by: INTERNAL MEDICINE

## 2022-10-21 PROCEDURE — 3075F SYST BP GE 130 - 139MM HG: CPT | Performed by: INTERNAL MEDICINE

## 2022-10-21 PROCEDURE — 99214 OFFICE O/P EST MOD 30 MIN: CPT | Performed by: INTERNAL MEDICINE

## 2022-10-21 PROCEDURE — 3008F BODY MASS INDEX DOCD: CPT | Performed by: INTERNAL MEDICINE

## 2022-10-21 RX ORDER — ALBUTEROL SULFATE 90 UG/1
2 AEROSOL, METERED RESPIRATORY (INHALATION) EVERY 4 HOURS PRN
Qty: 1 EACH | Refills: 0 | Status: SHIPPED | OUTPATIENT
Start: 2022-10-21

## 2022-10-21 RX ORDER — CEFADROXIL 500 MG/1
500 CAPSULE ORAL 2 TIMES DAILY
Qty: 14 CAPSULE | Refills: 0 | Status: SHIPPED | OUTPATIENT
Start: 2022-10-21

## 2022-11-10 ENCOUNTER — TELEPHONE (OUTPATIENT)
Dept: INTERNAL MEDICINE CLINIC | Facility: CLINIC | Age: 57
End: 2022-11-10

## 2022-11-10 NOTE — TELEPHONE ENCOUNTER
11-18 pt scheduled her nurse visit in [place] [address]. Internal medicine for her 1st shingles vaccine. Pleas ask Dr. Tushar Lloyd to order it.

## 2022-11-11 ENCOUNTER — OFFICE VISIT (OUTPATIENT)
Dept: SURGERY | Facility: CLINIC | Age: 57
End: 2022-11-11
Payer: MEDICAID

## 2022-11-11 VITALS
HEART RATE: 68 BPM | WEIGHT: 216.19 LBS | RESPIRATION RATE: 16 BRPM | OXYGEN SATURATION: 97 % | DIASTOLIC BLOOD PRESSURE: 78 MMHG | SYSTOLIC BLOOD PRESSURE: 122 MMHG | HEIGHT: 63 IN | TEMPERATURE: 97 F | BODY MASS INDEX: 38.3 KG/M2

## 2022-11-11 DIAGNOSIS — Z98.890 HISTORY OF RIGHT BREAST BIOPSY: Primary | ICD-10-CM

## 2022-11-11 DIAGNOSIS — N64.4 BREAST PAIN, RIGHT: ICD-10-CM

## 2022-11-11 PROCEDURE — 99244 OFF/OP CNSLTJ NEW/EST MOD 40: CPT | Performed by: SURGERY

## 2022-11-21 DIAGNOSIS — Z12.31 SCREENING MAMMOGRAM FOR BREAST CANCER: Primary | ICD-10-CM

## 2023-02-21 ENCOUNTER — TELEPHONE (OUTPATIENT)
Dept: INTERNAL MEDICINE CLINIC | Facility: CLINIC | Age: 58
End: 2023-02-21

## 2023-02-21 NOTE — TELEPHONE ENCOUNTER
Patient calling to schedule follow-up visit with Dr. Dinorah Hebert    History of hemorrhoids, recent constipation. A few days ago noticed blood when wiping.   Denies anal pain or itching    Advised to try OTC hemorrhoid cream if needed    Would also like to address hair loss      Future Appointments   Date Time Provider Mike Allen   3/3/2023 11:40 AM Eveline Batista MD [address]

## 2023-03-03 ENCOUNTER — OFFICE VISIT (OUTPATIENT)
Dept: INTERNAL MEDICINE CLINIC | Facility: CLINIC | Age: 58
End: 2023-03-03

## 2023-03-03 ENCOUNTER — LAB ENCOUNTER (OUTPATIENT)
Dept: LAB | Age: 58
End: 2023-03-03
Attending: INTERNAL MEDICINE
Payer: MEDICAID

## 2023-03-03 VITALS
SYSTOLIC BLOOD PRESSURE: 127 MMHG | WEIGHT: 216 LBS | HEIGHT: 63 IN | DIASTOLIC BLOOD PRESSURE: 82 MMHG | HEART RATE: 64 BPM | BODY MASS INDEX: 38.27 KG/M2

## 2023-03-03 DIAGNOSIS — N18.2 CKD (CHRONIC KIDNEY DISEASE), STAGE II: ICD-10-CM

## 2023-03-03 DIAGNOSIS — I10 PRIMARY HYPERTENSION: Primary | ICD-10-CM

## 2023-03-03 DIAGNOSIS — E55.9 VITAMIN D DEFICIENCY: ICD-10-CM

## 2023-03-03 DIAGNOSIS — L30.9 DERMATITIS: ICD-10-CM

## 2023-03-03 DIAGNOSIS — L65.9 HAIR LOSS: ICD-10-CM

## 2023-03-03 DIAGNOSIS — M54.50 LUMBAR PAIN: ICD-10-CM

## 2023-03-03 DIAGNOSIS — I10 PRIMARY HYPERTENSION: ICD-10-CM

## 2023-03-03 LAB
ALBUMIN SERPL-MCNC: 3.4 G/DL (ref 3.4–5)
ALBUMIN/GLOB SERPL: 0.8 {RATIO} (ref 1–2)
ALP LIVER SERPL-CCNC: 127 U/L
ALT SERPL-CCNC: 25 U/L
ANION GAP SERPL CALC-SCNC: 8 MMOL/L (ref 0–18)
AST SERPL-CCNC: 20 U/L (ref 15–37)
BASOPHILS # BLD AUTO: 0.03 X10(3) UL (ref 0–0.2)
BASOPHILS NFR BLD AUTO: 0.5 %
BILIRUB SERPL-MCNC: 0.3 MG/DL (ref 0.1–2)
BUN BLD-MCNC: 29 MG/DL (ref 7–18)
BUN/CREAT SERPL: 23 (ref 10–20)
CALCIUM BLD-MCNC: 9.4 MG/DL (ref 8.5–10.1)
CHLORIDE SERPL-SCNC: 104 MMOL/L (ref 98–112)
CO2 SERPL-SCNC: 29 MMOL/L (ref 21–32)
CREAT BLD-MCNC: 1.26 MG/DL
CREAT UR-SCNC: 175 MG/DL
DEPRECATED HBV CORE AB SER IA-ACNC: 440.8 NG/ML
DEPRECATED RDW RBC AUTO: 39.3 FL (ref 35.1–46.3)
EOSINOPHIL # BLD AUTO: 0.03 X10(3) UL (ref 0–0.7)
EOSINOPHIL NFR BLD AUTO: 0.5 %
ERYTHROCYTE [DISTWIDTH] IN BLOOD BY AUTOMATED COUNT: 13.6 % (ref 11–15)
FASTING STATUS PATIENT QL REPORTED: NO
FOLATE SERPL-MCNC: 19.7 NG/ML (ref 8.7–?)
GFR SERPLBLD BASED ON 1.73 SQ M-ARVRAT: 49 ML/MIN/1.73M2 (ref 60–?)
GLOBULIN PLAS-MCNC: 4.5 G/DL (ref 2.8–4.4)
GLUCOSE BLD-MCNC: 107 MG/DL (ref 70–99)
HCT VFR BLD AUTO: 44.1 %
HGB BLD-MCNC: 14 G/DL
IMM GRANULOCYTES # BLD AUTO: 0.01 X10(3) UL (ref 0–1)
IMM GRANULOCYTES NFR BLD: 0.2 %
LYMPHOCYTES # BLD AUTO: 1.72 X10(3) UL (ref 1–4)
LYMPHOCYTES NFR BLD AUTO: 26.5 %
MAGNESIUM SERPL-MCNC: 2.2 MG/DL (ref 1.6–2.6)
MCH RBC QN AUTO: 25.2 PG (ref 26–34)
MCHC RBC AUTO-ENTMCNC: 31.7 G/DL (ref 31–37)
MCV RBC AUTO: 79.3 FL
MICROALBUMIN UR-MCNC: 2.05 MG/DL
MICROALBUMIN/CREAT 24H UR-RTO: 11.7 UG/MG (ref ?–30)
MONOCYTES # BLD AUTO: 0.59 X10(3) UL (ref 0.1–1)
MONOCYTES NFR BLD AUTO: 9.1 %
NEUTROPHILS # BLD AUTO: 4.11 X10 (3) UL (ref 1.5–7.7)
NEUTROPHILS # BLD AUTO: 4.11 X10(3) UL (ref 1.5–7.7)
NEUTROPHILS NFR BLD AUTO: 63.2 %
OSMOLALITY SERPL CALC.SUM OF ELEC: 298 MOSM/KG (ref 275–295)
PLATELET # BLD AUTO: 277 10(3)UL (ref 150–450)
POTASSIUM SERPL-SCNC: 3.5 MMOL/L (ref 3.5–5.1)
PROT SERPL-MCNC: 7.9 G/DL (ref 6.4–8.2)
RBC # BLD AUTO: 5.56 X10(6)UL
SODIUM SERPL-SCNC: 141 MMOL/L (ref 136–145)
VIT B12 SERPL-MCNC: 1095 PG/ML (ref 193–986)
VIT D+METAB SERPL-MCNC: 24.4 NG/ML (ref 30–100)
WBC # BLD AUTO: 6.5 X10(3) UL (ref 4–11)

## 2023-03-03 PROCEDURE — 80053 COMPREHEN METABOLIC PANEL: CPT

## 2023-03-03 PROCEDURE — 3074F SYST BP LT 130 MM HG: CPT | Performed by: INTERNAL MEDICINE

## 2023-03-03 PROCEDURE — 99214 OFFICE O/P EST MOD 30 MIN: CPT | Performed by: INTERNAL MEDICINE

## 2023-03-03 PROCEDURE — 82570 ASSAY OF URINE CREATININE: CPT

## 2023-03-03 PROCEDURE — 82746 ASSAY OF FOLIC ACID SERUM: CPT

## 2023-03-03 PROCEDURE — 82306 VITAMIN D 25 HYDROXY: CPT

## 2023-03-03 PROCEDURE — 82043 UR ALBUMIN QUANTITATIVE: CPT

## 2023-03-03 PROCEDURE — 3079F DIAST BP 80-89 MM HG: CPT | Performed by: INTERNAL MEDICINE

## 2023-03-03 PROCEDURE — 82728 ASSAY OF FERRITIN: CPT

## 2023-03-03 PROCEDURE — 3008F BODY MASS INDEX DOCD: CPT | Performed by: INTERNAL MEDICINE

## 2023-03-03 PROCEDURE — 85025 COMPLETE CBC W/AUTO DIFF WBC: CPT

## 2023-03-03 PROCEDURE — 82607 VITAMIN B-12: CPT

## 2023-03-03 PROCEDURE — 36415 COLL VENOUS BLD VENIPUNCTURE: CPT

## 2023-03-03 PROCEDURE — 83735 ASSAY OF MAGNESIUM: CPT

## 2023-03-03 RX ORDER — TRIAMCINOLONE ACETONIDE 1 MG/G
CREAM TOPICAL
Qty: 60 G | Refills: 0 | Status: SHIPPED | OUTPATIENT
Start: 2023-03-03

## 2023-03-08 ENCOUNTER — TELEPHONE (OUTPATIENT)
Dept: INTERNAL MEDICINE CLINIC | Facility: CLINIC | Age: 58
End: 2023-03-08

## 2023-03-08 NOTE — TELEPHONE ENCOUNTER
Patient is calling in regards to her appt that was on 3/3/2023 and she was referred by  to see Alisha Cleverly and they cannot get her in until 6/16/2023. She states  said she would call and see if they can get her in sooner and she was calling to follow up on that.

## 2023-03-08 NOTE — TELEPHONE ENCOUNTER
Message routed to Nurse/MA for gastro. If possible would we be able to help patient schedule a sooner appointment to see Dr. Odette Velazquez or associates?

## 2023-03-13 ENCOUNTER — OFFICE VISIT (OUTPATIENT)
Dept: NEPHROLOGY | Facility: CLINIC | Age: 58
End: 2023-03-13

## 2023-03-13 VITALS
HEART RATE: 62 BPM | WEIGHT: 214 LBS | SYSTOLIC BLOOD PRESSURE: 140 MMHG | BODY MASS INDEX: 37.92 KG/M2 | DIASTOLIC BLOOD PRESSURE: 89 MMHG | HEIGHT: 63 IN

## 2023-03-13 DIAGNOSIS — N18.31 STAGE 3A CHRONIC KIDNEY DISEASE (HCC): Primary | ICD-10-CM

## 2023-03-13 DIAGNOSIS — I10 ESSENTIAL HYPERTENSION: ICD-10-CM

## 2023-03-13 PROCEDURE — 3079F DIAST BP 80-89 MM HG: CPT | Performed by: INTERNAL MEDICINE

## 2023-03-13 PROCEDURE — 3077F SYST BP >= 140 MM HG: CPT | Performed by: INTERNAL MEDICINE

## 2023-03-13 PROCEDURE — 3008F BODY MASS INDEX DOCD: CPT | Performed by: INTERNAL MEDICINE

## 2023-03-13 PROCEDURE — 99213 OFFICE O/P EST LOW 20 MIN: CPT | Performed by: INTERNAL MEDICINE

## 2023-03-23 ENCOUNTER — OFFICE VISIT (OUTPATIENT)
Dept: GASTROENTEROLOGY | Facility: CLINIC | Age: 58
End: 2023-03-23

## 2023-03-23 VITALS
WEIGHT: 218 LBS | DIASTOLIC BLOOD PRESSURE: 88 MMHG | BODY MASS INDEX: 38.62 KG/M2 | HEIGHT: 63 IN | SYSTOLIC BLOOD PRESSURE: 160 MMHG

## 2023-03-23 DIAGNOSIS — K21.9 GASTROESOPHAGEAL REFLUX DISEASE WITHOUT ESOPHAGITIS: ICD-10-CM

## 2023-03-23 DIAGNOSIS — R10.11 RUQ PAIN: Primary | ICD-10-CM

## 2023-03-23 PROCEDURE — 3077F SYST BP >= 140 MM HG: CPT | Performed by: INTERNAL MEDICINE

## 2023-03-23 PROCEDURE — 3008F BODY MASS INDEX DOCD: CPT | Performed by: INTERNAL MEDICINE

## 2023-03-23 PROCEDURE — 3079F DIAST BP 80-89 MM HG: CPT | Performed by: INTERNAL MEDICINE

## 2023-03-23 PROCEDURE — 99214 OFFICE O/P EST MOD 30 MIN: CPT | Performed by: INTERNAL MEDICINE

## 2023-03-23 RX ORDER — OMEPRAZOLE 40 MG/1
40 CAPSULE, DELAYED RELEASE ORAL DAILY
Qty: 90 CAPSULE | Refills: 3 | Status: SHIPPED | OUTPATIENT
Start: 2023-03-23 | End: 2024-03-17

## 2023-03-23 NOTE — PATIENT INSTRUCTIONS
Go for abdominal ultrasound  Avoid caffeine, chocolate, peppermint, and alcohol. Also avoid lying down flat or in a recumbent position for 3 hours after a meal.   Start omeprazole daily - 30 min before breakfast  Release of information for [facility] colonoscopy records (since 2018) and pathology   5. To get help with weight loss, I recommend seeing Dr. Lorene Bence at [facility]. Please call Phone: [phone] to set up appointment.

## 2023-03-27 ENCOUNTER — TELEPHONE (OUTPATIENT)
Facility: CLINIC | Age: 58
End: 2023-03-27

## 2023-04-12 NOTE — TELEPHONE ENCOUNTER
Received colonoscopy records done10/31/2020 from [facility] and placed on Dr Dulce Chung in [place] [address] for review.

## 2023-04-21 NOTE — TELEPHONE ENCOUNTER
Colonoscopy report reviewed-colonoscopy from October 2020    - Pandiverticulosis, type II hepatic flexure, lipoma 50 mm in the distal ascending colon status post biopsied. Yellowish fatty material seen  - Ascending colon normal, transverse colon normal descending colon normal, sigmoid normal.    The rectosigmoid was moderately tortuous with tight fixed angulation at 20 cm from the verge. He.  Pediatric scope with Did not pass this area. Then careful reduction and push required to pass the area with adult gastroscope. - External hemorrhoids.    - Repeat colonoscopy in 3 years for surveillance.       Dr. Ricarda Phillips:  -colon biopsy within normal limits

## 2023-04-29 ENCOUNTER — HOSPITAL ENCOUNTER (OUTPATIENT)
Dept: MAMMOGRAPHY | Facility: HOSPITAL | Age: 58
Discharge: HOME | End: 2023-04-29
Attending: SURGERY
Payer: MEDICAID

## 2023-04-29 DIAGNOSIS — Z12.31 SCREENING MAMMOGRAM FOR BREAST CANCER: ICD-10-CM

## 2023-04-29 PROCEDURE — 77063 BREAST TOMOSYNTHESIS BI: CPT | Performed by: SURGERY

## 2023-04-29 PROCEDURE — 77067 SCR MAMMO BI INCL CAD: CPT | Performed by: SURGERY

## 2023-05-08 ENCOUNTER — HOSPITAL ENCOUNTER (OUTPATIENT)
Dept: GENERAL RADIOLOGY | Age: 58
Discharge: HOME | End: 2023-05-08
Attending: INTERNAL MEDICINE
Payer: MEDICAID

## 2023-05-08 ENCOUNTER — TELEPHONE (OUTPATIENT)
Facility: CLINIC | Age: 58
End: 2023-05-08

## 2023-05-08 DIAGNOSIS — R06.2 WHEEZING: ICD-10-CM

## 2023-05-08 PROCEDURE — 71046 X-RAY EXAM CHEST 2 VIEWS: CPT | Performed by: INTERNAL MEDICINE

## 2023-05-08 NOTE — TELEPHONE ENCOUNTER
Pt is following up on a referral for Dr. Ryan Guardibobo  , pt states it is hard to get in touch with the office please follow up

## 2023-05-08 NOTE — TELEPHONE ENCOUNTER
I called Marcus at [phone] and spoke to Florina Agee. She confirmed receipt of our referral/information. It is in MD review which can take 7-14 days. Detailed message left on patient's voicemail (ok per verbal release on file) informing of above/that it is still in MD review along with [facility] Jackson Contact number.

## 2023-05-09 NOTE — TELEPHONE ENCOUNTER
Dr. Terry Dickinson,    I spoke to patient and let her know Marcus/ Dr. Faustina Moore office has all of the necessary information and is currently in MD review. She notes that she has been having issues with her bowels. Has not had a real bowel movement since this past Saturday. Has the feeling to go but only 2-3 drops will come out if she pushes. She did buy a fiber supplement this morning but requesting additional recommendations until she is seen by Dr. Ese Joshi. Please advise, thank you.

## 2023-05-12 RX ORDER — LOSARTAN POTASSIUM 50 MG/1
TABLET ORAL
Qty: 90 TABLET | Refills: 0 | Status: SHIPPED | OUTPATIENT
Start: 2023-05-12

## 2023-05-12 NOTE — TELEPHONE ENCOUNTER
LOV- 3/23/23  RTC- 7 months  F/U- left pt a message to call and schedule her 7 month follow up. Also sent pt a my chart message. Refill sent per protocol when MD is out of the office.

## 2023-05-16 ENCOUNTER — TELEPHONE (OUTPATIENT)
Facility: CLINIC | Age: 58
End: 2023-05-16

## 2023-05-16 DIAGNOSIS — K59.00 CONSTIPATION, UNSPECIFIED CONSTIPATION TYPE: ICD-10-CM

## 2023-05-16 DIAGNOSIS — R14.0 BLOATING: Primary | ICD-10-CM

## 2023-05-16 NOTE — TELEPHONE ENCOUNTER
Dr. Edelmira Sherwood,    Patient has been unable to schedule with Dr. Apoorva Vallecillo. She also notes that she tried the 1/2 Golytely that you recommended previously and she did not have a bowel movement. Has not gone in 4 days. Please advise, thank you.

## 2023-05-16 NOTE — TELEPHONE ENCOUNTER
Dr. Haylee Light,    I spoke to patient and reviewed information below. She will call to schedule x-ray. She also wanted to know if there is another GI you reccomend other than Dr. Staci Quiros since they are hard to get a hold of.

## 2023-05-16 NOTE — TELEPHONE ENCOUNTER
Pt states that she has not had a bowel movement since taking the medication to clear her out. It has been 4 days and she feels constipated. She also states that she tried to schedule the CLN with Dr Jose Miguel Graves. She is not getting a return call to schedule.   Please call

## 2023-05-17 NOTE — TELEPHONE ENCOUNTER
She could go to [facility] for another attempt at c-scope: either with Dr. Nu Garcia, Dr. Kevin Goff, or Dr. Lukas Frankel,

## 2023-05-18 ENCOUNTER — HOSPITAL ENCOUNTER (OUTPATIENT)
Dept: GENERAL RADIOLOGY | Age: 58
Discharge: HOME | End: 2023-05-18
Attending: INTERNAL MEDICINE
Payer: MEDICAID

## 2023-05-18 DIAGNOSIS — K59.00 CONSTIPATION, UNSPECIFIED CONSTIPATION TYPE: ICD-10-CM

## 2023-05-18 DIAGNOSIS — R14.0 BLOATING: ICD-10-CM

## 2023-05-18 PROCEDURE — 74021 RADEX ABDOMEN 3+ VIEWS: CPT | Performed by: INTERNAL MEDICINE

## 2023-05-20 ENCOUNTER — TELEPHONE (OUTPATIENT)
Dept: INTERNAL MEDICINE CLINIC | Facility: CLINIC | Age: 58
End: 2023-05-20

## 2023-05-20 NOTE — TELEPHONE ENCOUNTER
Dr Nurys HAGAN    Patient (name and  verified) with an update with her abd bloating and constipation. Patient is currently under the care of Dr Audrey Fenton for these symptoms. Patient is now having back pain for 2 weeks. Patient has followed up with Dr Audrey Fenton and recently had a Xray of the abd. Patient has not received the results from Dr Audrey Fenton. Patient was instructed by GI office to follow up with a specialist from [facility] but the patient states she is not receiving a call back to schedule an appt. Patient was told she had a \"twisted intestine 4 years ago\". The offices are now closed. Patient was instructed to go the ER for worsening symptoms of back pain with constipation. Patient verbalized understanding and agrees to plan.

## 2023-05-22 ENCOUNTER — TELEPHONE (OUTPATIENT)
Facility: CLINIC | Age: 58
End: 2023-05-22

## 2023-05-22 DIAGNOSIS — Q43.8 TORTUOUS COLON: Primary | ICD-10-CM

## 2023-05-22 DIAGNOSIS — K59.09 CHRONIC CONSTIPATION: Primary | ICD-10-CM

## 2023-05-22 NOTE — TELEPHONE ENCOUNTER
D/w patient re: X-ray abdomen showing constipation. No unusual weigh tloss, etc. She is moving her bowels, remain on miralax BID. She will call [facility] as she did not hear back from [facility].     GI Staff:   [facility] referral placed, please send

## 2023-05-22 NOTE — TELEPHONE ENCOUNTER
Pt states that Dr Rebecca La referred her to [facility] for a colonoscopy. Bhavik Pineda is requesting a referral to be faxed to [phone].

## 2023-05-23 NOTE — TELEPHONE ENCOUNTER
Dr. Gonsalo Woodruff,    Patient is requesting to speak directly to you regarding her next colonoscopy. Please call when able.

## 2023-05-23 NOTE — TELEPHONE ENCOUNTER
Patient states she spoke with [facility] and was told that she is not due for CLN until 10/2023. Patient requesting to speak with Dr Ortega Mckeon. Please call. Thank you.

## 2023-05-26 ENCOUNTER — HOSPITAL ENCOUNTER (OUTPATIENT)
Dept: ULTRASOUND IMAGING | Facility: HOSPITAL | Age: 58
Discharge: HOME | End: 2023-05-26
Attending: INTERNAL MEDICINE
Payer: MEDICAID

## 2023-05-26 DIAGNOSIS — R10.11 RUQ PAIN: ICD-10-CM

## 2023-05-26 PROCEDURE — 76705 ECHO EXAM OF ABDOMEN: CPT | Performed by: INTERNAL MEDICINE

## 2023-05-26 NOTE — PROGRESS NOTES
Dear Rosalinda Sahu- your ultrasound results look good, no gallstones noted. No inflammation seen. Your liver size is slightly enlarged but we see that commonly, your liver is working appropriately. Please follow-up with Marcus for your colonoscopy. Your last colonoscopy was in October 2020, so your repeat is due around Oct 2023.     Jenny Solorio

## 2023-06-02 ENCOUNTER — HOSPITAL ENCOUNTER (EMERGENCY)
Facility: HOSPITAL | Age: 58
Discharge: HOME | End: 2023-06-02
Attending: EMERGENCY MEDICINE
Payer: MEDICAID

## 2023-06-02 VITALS
OXYGEN SATURATION: 98 % | HEART RATE: 74 BPM | TEMPERATURE: 97 F | SYSTOLIC BLOOD PRESSURE: 130 MMHG | RESPIRATION RATE: 18 BRPM | DIASTOLIC BLOOD PRESSURE: 72 MMHG

## 2023-06-02 DIAGNOSIS — J04.0 ACUTE LARYNGITIS: ICD-10-CM

## 2023-06-02 DIAGNOSIS — J02.9 VIRAL PHARYNGITIS: Primary | ICD-10-CM

## 2023-06-02 LAB — S PYO AG THROAT QL: NEGATIVE

## 2023-06-02 PROCEDURE — 99283 EMERGENCY DEPT VISIT LOW MDM: CPT

## 2023-06-02 PROCEDURE — 99284 EMERGENCY DEPT VISIT MOD MDM: CPT

## 2023-06-02 PROCEDURE — 87880 STREP A ASSAY W/OPTIC: CPT

## 2023-06-27 RX ORDER — HYDROCHLOROTHIAZIDE 25 MG/1
25 TABLET ORAL DAILY
Qty: 90 TABLET | Refills: 3 | Status: SHIPPED | OUTPATIENT
Start: 2023-06-27

## 2023-08-04 ENCOUNTER — OFFICE VISIT (OUTPATIENT)
Dept: SURGERY | Facility: CLINIC | Age: 58
End: 2023-08-04
Payer: MEDICAID

## 2023-08-04 VITALS
DIASTOLIC BLOOD PRESSURE: 82 MMHG | BODY MASS INDEX: 37.62 KG/M2 | WEIGHT: 215 LBS | HEIGHT: 63.3 IN | SYSTOLIC BLOOD PRESSURE: 138 MMHG | OXYGEN SATURATION: 100 % | HEART RATE: 71 BPM

## 2023-08-04 DIAGNOSIS — R20.2 NUMBNESS AND TINGLING IN BOTH HANDS: ICD-10-CM

## 2023-08-04 DIAGNOSIS — K21.9 GASTROESOPHAGEAL REFLUX DISEASE WITHOUT ESOPHAGITIS: ICD-10-CM

## 2023-08-04 DIAGNOSIS — R20.0 NUMBNESS AND TINGLING IN BOTH HANDS: ICD-10-CM

## 2023-08-04 DIAGNOSIS — E66.9 OBESITY (BMI 30-39.9): ICD-10-CM

## 2023-08-04 DIAGNOSIS — N18.9 CHRONIC KIDNEY DISEASE, UNSPECIFIED CKD STAGE: ICD-10-CM

## 2023-08-04 DIAGNOSIS — I10 PRIMARY HYPERTENSION: Primary | ICD-10-CM

## 2023-08-04 PROCEDURE — 3075F SYST BP GE 130 - 139MM HG: CPT | Performed by: NURSE PRACTITIONER

## 2023-08-04 PROCEDURE — 3008F BODY MASS INDEX DOCD: CPT | Performed by: NURSE PRACTITIONER

## 2023-08-04 PROCEDURE — 3079F DIAST BP 80-89 MM HG: CPT | Performed by: NURSE PRACTITIONER

## 2023-08-04 PROCEDURE — 99204 OFFICE O/P NEW MOD 45 MIN: CPT | Performed by: NURSE PRACTITIONER

## 2023-08-04 NOTE — PATIENT INSTRUCTIONS
Update labs. Probiotic. Reduce soda to 1/2 cans/day  Replace additional servings with water    I recommend a whole food, plant powered diet with low glycemic index:     Aim for 3 meals a day and 1-2 snacks as needed. 11 AM Fruit   3 PM nuts/seeds    Aim for a protein + produce at each meal time. Keep meals between 7 am and 7 pm.     Breakfast ideas:  1. Fruit and nuts/seeds. 2. Eggs scrambled with vegetables. 3. Oatmeal (stovetop), cinnamon, 2 tbsp flaxseed, berries, nuts. 4. Protein shake + fruit. (1 scoop with water/ice). [place] [address], [place], [place], and [place] are good brands. Snacks:  Raw vegetables and hummus, apples and peanut butter, nuts, seeds, fruit, pecans drizzled with organic honey. Use the Healthy Plate method for lunch and dinner:  1/2 right side of plate non-starchy vegetables. Bottom left 1/4 plate protein. Top left 1/4 starch as desired. Aim for a total of:  2 fruits a day (avocado, tomato, citrus/oranges, apples, berries)  1/2 cup or medium size    4 non-starchy vegetables (handful) (greens, peppers, onions, garlic, broccoli, cauliflower, etc.)    0-2 starches (oatmeal, sweet potato, carrots, brown rice, etc). 3 protein per day (fish, seafood, meat, or plants: salmon, nuts, seeds, shrimp, chicken, turkey, beans, lentils, chickpeas, etc).     2 healthy fats (avocado, avocado oil, olives, olive oil, salmon, nuts, seeds)

## 2023-08-05 ENCOUNTER — HOSPITAL ENCOUNTER (EMERGENCY)
Facility: HOSPITAL | Age: 58
Discharge: HOME | End: 2023-08-05
Attending: EMERGENCY MEDICINE
Payer: MEDICAID

## 2023-08-05 VITALS
RESPIRATION RATE: 20 BRPM | HEART RATE: 64 BPM | TEMPERATURE: 98 F | OXYGEN SATURATION: 98 % | DIASTOLIC BLOOD PRESSURE: 84 MMHG | SYSTOLIC BLOOD PRESSURE: 144 MMHG

## 2023-08-05 DIAGNOSIS — S39.012A BACK STRAIN, INITIAL ENCOUNTER: Primary | ICD-10-CM

## 2023-08-05 PROCEDURE — 96372 THER/PROPH/DIAG INJ SC/IM: CPT

## 2023-08-05 PROCEDURE — 99284 EMERGENCY DEPT VISIT MOD MDM: CPT

## 2023-08-05 PROCEDURE — 99283 EMERGENCY DEPT VISIT LOW MDM: CPT

## 2023-08-05 RX ORDER — DIAZEPAM 5 MG/1
5 TABLET ORAL 3 TIMES DAILY PRN
Qty: 12 TABLET | Refills: 0 | Status: SHIPPED | OUTPATIENT
Start: 2023-08-05 | End: 2023-08-12

## 2023-08-05 RX ORDER — KETOROLAC TROMETHAMINE 10 MG/1
10 TABLET, FILM COATED ORAL EVERY 6 HOURS PRN
Qty: 12 TABLET | Refills: 0 | Status: SHIPPED | OUTPATIENT
Start: 2023-08-05 | End: 2023-08-12

## 2023-08-05 RX ORDER — DIAZEPAM 5 MG/1
5 TABLET ORAL ONCE
Status: COMPLETED | OUTPATIENT
Start: 2023-08-05 | End: 2023-08-05

## 2023-08-05 RX ORDER — KETOROLAC TROMETHAMINE 30 MG/ML
30 INJECTION, SOLUTION INTRAMUSCULAR; INTRAVENOUS ONCE
Status: COMPLETED | OUTPATIENT
Start: 2023-08-05 | End: 2023-08-05

## 2023-08-05 NOTE — ED INITIAL ASSESSMENT (HPI)
Patient arrives complaining of lumbar pain that started yesterday after doing some heavy lifting. No dysuria.

## 2023-08-08 NOTE — TELEPHONE ENCOUNTER
Message given to [facility]. This encounter closed and placed under Dr. Tiffany Cummings telephone encounter from today. Please reduce your insulin dosage by 1/3.  If you are taking 90 units, twice a day, this should be about 60 units in the morning and 60 units at night.  Please return immediately if you get worse or if new problems develop.  Please follow-up with your primary care doctor this week.  Please follow-up with the cardiologist above.  Regular meals.  Usual medicines.

## 2023-08-09 ENCOUNTER — LAB ENCOUNTER (OUTPATIENT)
Dept: LAB | Facility: HOSPITAL | Age: 58
End: 2023-08-09
Attending: NURSE PRACTITIONER
Payer: MEDICAID

## 2023-08-09 DIAGNOSIS — R20.2 NUMBNESS AND TINGLING IN BOTH HANDS: ICD-10-CM

## 2023-08-09 DIAGNOSIS — E66.9 OBESITY (BMI 30-39.9): ICD-10-CM

## 2023-08-09 DIAGNOSIS — I10 PRIMARY HYPERTENSION: ICD-10-CM

## 2023-08-09 DIAGNOSIS — R20.0 NUMBNESS AND TINGLING IN BOTH HANDS: ICD-10-CM

## 2023-08-09 DIAGNOSIS — K21.9 GASTROESOPHAGEAL REFLUX DISEASE WITHOUT ESOPHAGITIS: ICD-10-CM

## 2023-08-09 DIAGNOSIS — N18.9 CHRONIC KIDNEY DISEASE, UNSPECIFIED CKD STAGE: ICD-10-CM

## 2023-08-09 LAB
ALBUMIN SERPL-MCNC: 3.1 G/DL (ref 3.4–5)
ALBUMIN/GLOB SERPL: 0.7 {RATIO} (ref 1–2)
ALP LIVER SERPL-CCNC: 113 U/L
ALT SERPL-CCNC: 23 U/L
ANION GAP SERPL CALC-SCNC: 6 MMOL/L (ref 0–18)
AST SERPL-CCNC: 16 U/L (ref 15–37)
BILIRUB SERPL-MCNC: 0.4 MG/DL (ref 0.1–2)
BUN BLD-MCNC: 24 MG/DL (ref 7–18)
BUN/CREAT SERPL: 19.8 (ref 10–20)
CALCIUM BLD-MCNC: 9.2 MG/DL (ref 8.5–10.1)
CHLORIDE SERPL-SCNC: 108 MMOL/L (ref 98–112)
CHOLEST SERPL-MCNC: 192 MG/DL (ref ?–200)
CO2 SERPL-SCNC: 27 MMOL/L (ref 21–32)
CREAT BLD-MCNC: 1.21 MG/DL
EGFRCR SERPLBLD CKD-EPI 2021: 51 ML/MIN/1.73M2 (ref 60–?)
EST. AVERAGE GLUCOSE BLD GHB EST-MCNC: 131 MG/DL (ref 68–126)
FASTING PATIENT LIPID ANSWER: YES
FASTING STATUS PATIENT QL REPORTED: YES
GLOBULIN PLAS-MCNC: 4.5 G/DL (ref 2.8–4.4)
GLUCOSE BLD-MCNC: 103 MG/DL (ref 70–99)
HBA1C MFR BLD: 6.2 % (ref ?–5.7)
HDLC SERPL-MCNC: 76 MG/DL (ref 40–59)
INSULIN SERPL-ACNC: 21.8 MU/L (ref 3–25)
LDLC SERPL CALC-MCNC: 107 MG/DL (ref ?–100)
NONHDLC SERPL-MCNC: 116 MG/DL (ref ?–130)
OSMOLALITY SERPL CALC.SUM OF ELEC: 296 MOSM/KG (ref 275–295)
POTASSIUM SERPL-SCNC: 3.6 MMOL/L (ref 3.5–5.1)
PROT SERPL-MCNC: 7.6 G/DL (ref 6.4–8.2)
SODIUM SERPL-SCNC: 141 MMOL/L (ref 136–145)
T4 FREE SERPL-MCNC: 1.2 NG/DL (ref 0.8–1.7)
TRIGL SERPL-MCNC: 47 MG/DL (ref 30–149)
TSI SER-ACNC: 2.22 MIU/ML (ref 0.36–3.74)
VIT D+METAB SERPL-MCNC: 36.2 NG/ML (ref 30–100)
VLDLC SERPL CALC-MCNC: 8 MG/DL (ref 0–30)

## 2023-08-09 PROCEDURE — 82306 VITAMIN D 25 HYDROXY: CPT

## 2023-08-09 PROCEDURE — 83525 ASSAY OF INSULIN: CPT

## 2023-08-09 PROCEDURE — 83036 HEMOGLOBIN GLYCOSYLATED A1C: CPT

## 2023-08-09 PROCEDURE — 80053 COMPREHEN METABOLIC PANEL: CPT

## 2023-08-09 PROCEDURE — 36415 COLL VENOUS BLD VENIPUNCTURE: CPT

## 2023-08-09 PROCEDURE — 83520 IMMUNOASSAY QUANT NOS NONAB: CPT

## 2023-08-09 PROCEDURE — 84443 ASSAY THYROID STIM HORMONE: CPT

## 2023-08-09 PROCEDURE — 80061 LIPID PANEL: CPT

## 2023-08-09 PROCEDURE — 84439 ASSAY OF FREE THYROXINE: CPT

## 2023-08-11 LAB — LEPTIN: 94.8 NG/ML

## 2023-08-18 NOTE — TELEPHONE ENCOUNTER
LOV-3/13/23  RTC-7 months around October  Follow up-no appt yet. Please sign pending order if appropriate thanks.

## 2023-08-21 RX ORDER — LOSARTAN POTASSIUM 50 MG/1
50 TABLET ORAL DAILY
Qty: 90 TABLET | Refills: 0 | Status: SHIPPED | OUTPATIENT
Start: 2023-08-21

## 2023-09-01 DIAGNOSIS — N18.9 CHRONIC KIDNEY DISEASE, UNSPECIFIED CKD STAGE: ICD-10-CM

## 2023-09-01 DIAGNOSIS — E66.9 OBESITY (BMI 30-39.9): ICD-10-CM

## 2023-09-01 DIAGNOSIS — R20.0 NUMBNESS AND TINGLING IN BOTH HANDS: ICD-10-CM

## 2023-09-01 DIAGNOSIS — I10 PRIMARY HYPERTENSION: Primary | ICD-10-CM

## 2023-09-01 DIAGNOSIS — K21.9 GASTROESOPHAGEAL REFLUX DISEASE WITHOUT ESOPHAGITIS: ICD-10-CM

## 2023-09-01 DIAGNOSIS — R20.2 NUMBNESS AND TINGLING IN BOTH HANDS: ICD-10-CM

## 2023-09-11 ENCOUNTER — VIRTUAL PHONE E/M (OUTPATIENT)
Facility: CLINIC | Age: 58
End: 2023-09-11
Payer: MEDICAID

## 2023-09-11 ENCOUNTER — TELEPHONE (OUTPATIENT)
Facility: CLINIC | Age: 58
End: 2023-09-11

## 2023-09-11 DIAGNOSIS — R14.0 ABDOMINAL BLOATING: Primary | ICD-10-CM

## 2023-09-11 DIAGNOSIS — K59.04 CHRONIC IDIOPATHIC CONSTIPATION: ICD-10-CM

## 2023-09-11 NOTE — PROGRESS NOTES
Virtual Telephone Check-In    John Johnson verbally consents to a Virtual/Telephone Check-In visit on 09/11/23. Patient has been referred to the [facility] website at [email or web address] to review the yearly Consent to Treat document. Patient understands and accepts financial responsibility for any deductible, co-insurance and/or co-pays associated with this service. Duration of the service: 12 minutes    John Johnson is a 59year old year-old female with history of TA of colon, GERD, HTN: who has been having worsening bloating and ab pain, constipation. -patient seen 6 months ago  -since then she went to [facility] and she says completed a colonoscopy---> NO polyps noted, told to repeat in 2028.  -she has bee having pains on her L side, no fevers/chills/vomiting  -incomplete bowel movements daily  -her last ab x-ray showed constipation      #Assessment - suspect CIC causing constipation/fecal loading. No red flag sx that need immediate ER visit however if any fevers/chills/vomiting, worsening sx, go to ER. Plan:  -washout with miralax/gatorade  -miralax regularly (1 capful daily with water)  -repeat CLN in 2028 at [facility]  -continue PPI daily for reflux/GERD  -avoid NSAIDS    Clearing fecal retention with Miralax (or Dulcolax Balance) and Gatorade    1. Buy one 238 gram bottle of Miralax or Dulcolax Balance. Buy 64 oz of the Gatorade flavor of your choice. 2. Pour the contents of the MiraLAX bottle and the 64 oz of. Gatorade into a pitcher and stir. Let the \"punch\" sit for five minutes for the solution to fully dissolve. This will taste better if you drink it cold. 3. Drink 8 oz. of the \"punch\" every 20 to 30 minutes until completed. Okay to have clear liquids while drinking the laxative. Do not eat solid food while drinking the washout solution. 4. Once completed the solution, wait a few hours and then okay to have a light meal later in the day.          Jo Harmon MD

## 2023-09-11 NOTE — TELEPHONE ENCOUNTER
Blossom Linda- can you please call patient and set up a virtual visit for this afternoon at 1pm. Thank you!

## 2023-09-11 NOTE — TELEPHONE ENCOUNTER
Dr Nick Riddle and spoke to the patient, /name verified. She won't be able to do the 1 pm since she is at work but she is available after 3:15 pm today and she is contemplating on possibly going to the ER, she is now c/o pain on her left side.     Thank you

## 2023-09-11 NOTE — TELEPHONE ENCOUNTER
Dr Larry Almeida and spoke to the patient d/t message below, /name verified. The patient reported experiencing post prandial lower abdominal pain since August but more noticeable 2 weeks ago. She described the pain as \"achy\" and pain level 5-6/10 which does not radiate. She denies n/v, fever. Her abdomen feels soft, she feels BM is normal 3x/day. Stools soft, had 1 episode of liquid stools this past Saturday. She stopped omeprazole, restarted 2 days ago. Advised to take at least 30 min before breakfast.    Suggested she should go to ED for evaluation if the pain gets severe which she verbalized understanding.     Thank you

## 2023-09-11 NOTE — TELEPHONE ENCOUNTER
left message to call back    Please transfer to [id number]    Or please schedule for virtual visit with Dr Hernandez Or today at 1 pm  Thanks

## 2023-09-11 NOTE — TELEPHONE ENCOUNTER
Pt states that since she had colonoscopy done her lower stomach hurts whenever she eats anything. Please call.

## 2023-09-29 ENCOUNTER — OFFICE VISIT (OUTPATIENT)
Dept: INTERNAL MEDICINE CLINIC | Facility: CLINIC | Age: 58
End: 2023-09-29

## 2023-09-29 VITALS
RESPIRATION RATE: 18 BRPM | SYSTOLIC BLOOD PRESSURE: 129 MMHG | DIASTOLIC BLOOD PRESSURE: 83 MMHG | WEIGHT: 213.63 LBS | HEIGHT: 63 IN | HEART RATE: 56 BPM | BODY MASS INDEX: 37.85 KG/M2

## 2023-09-29 DIAGNOSIS — R73.03 PREDIABETES: ICD-10-CM

## 2023-09-29 DIAGNOSIS — I10 PRIMARY HYPERTENSION: Primary | ICD-10-CM

## 2023-09-29 DIAGNOSIS — K21.9 GASTROESOPHAGEAL REFLUX DISEASE WITHOUT ESOPHAGITIS: ICD-10-CM

## 2023-09-29 DIAGNOSIS — L29.9 ITCHY SCALP: ICD-10-CM

## 2023-09-29 DIAGNOSIS — E66.9 OBESITY (BMI 30-39.9): ICD-10-CM

## 2023-09-29 PROCEDURE — 3008F BODY MASS INDEX DOCD: CPT | Performed by: INTERNAL MEDICINE

## 2023-09-29 PROCEDURE — 3074F SYST BP LT 130 MM HG: CPT | Performed by: INTERNAL MEDICINE

## 2023-09-29 PROCEDURE — 99214 OFFICE O/P EST MOD 30 MIN: CPT | Performed by: INTERNAL MEDICINE

## 2023-09-29 PROCEDURE — 3079F DIAST BP 80-89 MM HG: CPT | Performed by: INTERNAL MEDICINE

## 2023-09-29 RX ORDER — AMITRIPTYLINE HYDROCHLORIDE 10 MG/1
TABLET, FILM COATED ORAL
Qty: 180 TABLET | Refills: 0 | Status: SHIPPED | OUTPATIENT
Start: 2023-09-29

## 2023-09-29 RX ORDER — LOSARTAN POTASSIUM 50 MG/1
50 TABLET ORAL DAILY
Qty: 90 TABLET | Refills: 3 | Status: SHIPPED | OUTPATIENT
Start: 2023-09-29

## 2023-09-29 RX ORDER — ATENOLOL 50 MG/1
50 TABLET ORAL DAILY
Qty: 90 TABLET | Refills: 3 | Status: SHIPPED | OUTPATIENT
Start: 2023-09-29

## 2023-10-04 ENCOUNTER — TELEMEDICINE (OUTPATIENT)
Dept: SURGERY | Facility: CLINIC | Age: 58
End: 2023-10-04
Payer: MEDICAID

## 2023-10-04 VITALS — BODY MASS INDEX: 38 KG/M2 | WEIGHT: 215 LBS

## 2023-10-04 DIAGNOSIS — N18.9 CHRONIC KIDNEY DISEASE, UNSPECIFIED CKD STAGE: ICD-10-CM

## 2023-10-04 DIAGNOSIS — R20.2 NUMBNESS AND TINGLING IN BOTH HANDS: ICD-10-CM

## 2023-10-04 DIAGNOSIS — K21.9 GASTROESOPHAGEAL REFLUX DISEASE WITHOUT ESOPHAGITIS: ICD-10-CM

## 2023-10-04 DIAGNOSIS — I10 PRIMARY HYPERTENSION: Primary | ICD-10-CM

## 2023-10-04 DIAGNOSIS — E66.9 OBESITY (BMI 30-39.9): ICD-10-CM

## 2023-10-04 DIAGNOSIS — R20.0 NUMBNESS AND TINGLING IN BOTH HANDS: ICD-10-CM

## 2023-10-04 PROCEDURE — 99214 OFFICE O/P EST MOD 30 MIN: CPT | Performed by: NURSE PRACTITIONER

## 2023-10-04 RX ORDER — PHENTERMINE HYDROCHLORIDE 15 MG/1
15 CAPSULE ORAL EVERY MORNING
Qty: 30 CAPSULE | Refills: 3 | Status: SHIPPED | OUTPATIENT
Start: 2023-10-04

## 2023-11-08 ENCOUNTER — OFFICE VISIT (OUTPATIENT)
Dept: FAMILY MEDICINE CLINIC | Facility: CLINIC | Age: 58
End: 2023-11-08
Payer: MEDICAID

## 2023-11-08 VITALS
DIASTOLIC BLOOD PRESSURE: 70 MMHG | TEMPERATURE: 98 F | BODY MASS INDEX: 38.09 KG/M2 | SYSTOLIC BLOOD PRESSURE: 120 MMHG | WEIGHT: 215 LBS | HEIGHT: 63 IN | RESPIRATION RATE: 18 BRPM | OXYGEN SATURATION: 100 % | HEART RATE: 80 BPM

## 2023-11-08 DIAGNOSIS — R05.8 COUGH PRESENT FOR GREATER THAN 3 WEEKS: Primary | ICD-10-CM

## 2023-11-08 LAB
OPERATOR ID: NORMAL
RAPID SARS-COV-2 BY PCR: NOT DETECTED

## 2023-11-08 PROCEDURE — U0002 COVID-19 LAB TEST NON-CDC: HCPCS | Performed by: NURSE PRACTITIONER

## 2023-11-08 PROCEDURE — 99213 OFFICE O/P EST LOW 20 MIN: CPT | Performed by: NURSE PRACTITIONER

## 2023-11-08 PROCEDURE — 3008F BODY MASS INDEX DOCD: CPT | Performed by: NURSE PRACTITIONER

## 2023-11-08 PROCEDURE — 3078F DIAST BP <80 MM HG: CPT | Performed by: NURSE PRACTITIONER

## 2023-11-08 PROCEDURE — 3074F SYST BP LT 130 MM HG: CPT | Performed by: NURSE PRACTITIONER

## 2023-11-08 RX ORDER — DOXYCYCLINE HYCLATE 100 MG/1
100 CAPSULE ORAL 2 TIMES DAILY
Qty: 14 CAPSULE | Refills: 0 | Status: SHIPPED | OUTPATIENT
Start: 2023-11-08 | End: 2023-11-15

## 2023-11-08 NOTE — PATIENT INSTRUCTIONS
-Doxycyline as prescribed   -Push fluids and plenty of rest    -Vicks to chest  -OTC cough medicine such as Mucinex DM or Robitussin DM (guaifenesin and dextromethorphan) as packet insert for dry and congested cough.     -OTC Tylenol as packet insert If no allergies  -Soothing cough drops as packet insert   -Good handwashing, to prevent spread of virus    -Face mask helps prevent viral infections    Follow up in 3-5 days for worsening symptoms with clinic or PCP

## 2024-01-09 NOTE — TELEPHONE ENCOUNTER
Caller Name: Saud Christopher Henderson    Call Back Number: [phone]      How would the patient prefer to be contacted with a response: Phone call OK to leave a detailed message    Patient's mom is requesting a letter for the patient's  to state that he is well to go to school. Please advise.    Dr. Cantu Loss refilled for 30 days. ^ month refill sent as requested.

## 2024-02-21 ENCOUNTER — OFFICE VISIT (OUTPATIENT)
Dept: INTERNAL MEDICINE CLINIC | Facility: CLINIC | Age: 59
End: 2024-02-21

## 2024-02-21 ENCOUNTER — LAB ENCOUNTER (OUTPATIENT)
Dept: LAB | Age: 59
End: 2024-02-21
Attending: INTERNAL MEDICINE
Payer: MEDICAID

## 2024-02-21 VITALS
DIASTOLIC BLOOD PRESSURE: 82 MMHG | WEIGHT: 216 LBS | BODY MASS INDEX: 38.27 KG/M2 | HEIGHT: 63 IN | SYSTOLIC BLOOD PRESSURE: 131 MMHG | HEART RATE: 62 BPM | RESPIRATION RATE: 16 BRPM

## 2024-02-21 DIAGNOSIS — R53.83 OTHER FATIGUE: ICD-10-CM

## 2024-02-21 DIAGNOSIS — R20.2 NUMBNESS AND TINGLING IN BOTH HANDS: ICD-10-CM

## 2024-02-21 DIAGNOSIS — R73.03 PREDIABETES: ICD-10-CM

## 2024-02-21 DIAGNOSIS — N18.9 CHRONIC KIDNEY DISEASE, UNSPECIFIED CKD STAGE: ICD-10-CM

## 2024-02-21 DIAGNOSIS — Z13.21 ENCOUNTER FOR VITAMIN DEFICIENCY SCREENING: ICD-10-CM

## 2024-02-21 DIAGNOSIS — G56.01 CARPAL TUNNEL SYNDROME OF RIGHT WRIST: ICD-10-CM

## 2024-02-21 DIAGNOSIS — R13.19 OTHER DYSPHAGIA: ICD-10-CM

## 2024-02-21 DIAGNOSIS — Z12.31 SCREENING MAMMOGRAM FOR BREAST CANCER: ICD-10-CM

## 2024-02-21 DIAGNOSIS — I10 PRIMARY HYPERTENSION: ICD-10-CM

## 2024-02-21 DIAGNOSIS — M25.541 ARTHRALGIA OF BOTH HANDS: ICD-10-CM

## 2024-02-21 DIAGNOSIS — M25.542 ARTHRALGIA OF BOTH HANDS: ICD-10-CM

## 2024-02-21 DIAGNOSIS — M25.541 ARTHRALGIA OF BOTH HANDS: Primary | ICD-10-CM

## 2024-02-21 DIAGNOSIS — E66.9 OBESITY (BMI 30-39.9): ICD-10-CM

## 2024-02-21 DIAGNOSIS — K21.9 GASTROESOPHAGEAL REFLUX DISEASE WITHOUT ESOPHAGITIS: ICD-10-CM

## 2024-02-21 DIAGNOSIS — M25.542 ARTHRALGIA OF BOTH HANDS: Primary | ICD-10-CM

## 2024-02-21 DIAGNOSIS — E87.6 HYPOKALEMIA: ICD-10-CM

## 2024-02-21 DIAGNOSIS — R20.0 NUMBNESS AND TINGLING IN BOTH HANDS: ICD-10-CM

## 2024-02-21 LAB
ALBUMIN SERPL-MCNC: 4.2 G/DL (ref 3.2–4.8)
ALBUMIN/GLOB SERPL: 1.4 {RATIO} (ref 1–2)
ALP LIVER SERPL-CCNC: 112 U/L
ALT SERPL-CCNC: 21 U/L
ANION GAP SERPL CALC-SCNC: 9 MMOL/L (ref 0–18)
AST SERPL-CCNC: 23 U/L (ref ?–34)
BASOPHILS # BLD AUTO: 0.02 X10(3) UL (ref 0–0.2)
BASOPHILS NFR BLD AUTO: 0.3 %
BILIRUB SERPL-MCNC: 0.3 MG/DL (ref 0.2–1.1)
BUN BLD-MCNC: 22 MG/DL (ref 9–23)
BUN/CREAT SERPL: 19 (ref 10–20)
CALCIUM BLD-MCNC: 9.5 MG/DL (ref 8.7–10.4)
CHLORIDE SERPL-SCNC: 104 MMOL/L (ref 98–112)
CO2 SERPL-SCNC: 28 MMOL/L (ref 21–32)
CREAT BLD-MCNC: 1.16 MG/DL
CRP SERPL-MCNC: 1.5 MG/DL (ref ?–1)
DEPRECATED RDW RBC AUTO: 39.9 FL (ref 35.1–46.3)
EGFRCR SERPLBLD CKD-EPI 2021: 54 ML/MIN/1.73M2 (ref 60–?)
EOSINOPHIL # BLD AUTO: 0.06 X10(3) UL (ref 0–0.7)
EOSINOPHIL NFR BLD AUTO: 1 %
ERYTHROCYTE [DISTWIDTH] IN BLOOD BY AUTOMATED COUNT: 14.5 % (ref 11–15)
ERYTHROCYTE [SEDIMENTATION RATE] IN BLOOD: 63 MM/HR
EST. AVERAGE GLUCOSE BLD GHB EST-MCNC: 137 MG/DL (ref 68–126)
FASTING STATUS PATIENT QL REPORTED: NO
GLOBULIN PLAS-MCNC: 2.9 G/DL (ref 2.8–4.4)
GLUCOSE BLD-MCNC: 82 MG/DL (ref 70–99)
HBA1C MFR BLD: 6.4 % (ref ?–5.7)
HCT VFR BLD AUTO: 41.5 %
HGB BLD-MCNC: 13.1 G/DL
IMM GRANULOCYTES # BLD AUTO: 0.02 X10(3) UL (ref 0–1)
IMM GRANULOCYTES NFR BLD: 0.3 %
LYMPHOCYTES # BLD AUTO: 2.27 X10(3) UL (ref 1–4)
LYMPHOCYTES NFR BLD AUTO: 36 %
MCH RBC QN AUTO: 24.4 PG (ref 26–34)
MCHC RBC AUTO-ENTMCNC: 31.6 G/DL (ref 31–37)
MCV RBC AUTO: 77.3 FL
MONOCYTES # BLD AUTO: 0.62 X10(3) UL (ref 0.1–1)
MONOCYTES NFR BLD AUTO: 9.8 %
NEUTROPHILS # BLD AUTO: 3.31 X10 (3) UL (ref 1.5–7.7)
NEUTROPHILS # BLD AUTO: 3.31 X10(3) UL (ref 1.5–7.7)
NEUTROPHILS NFR BLD AUTO: 52.6 %
OSMOLALITY SERPL CALC.SUM OF ELEC: 294 MOSM/KG (ref 275–295)
PLATELET # BLD AUTO: 249 10(3)UL (ref 150–450)
POTASSIUM SERPL-SCNC: 3.4 MMOL/L (ref 3.5–5.1)
PROT SERPL-MCNC: 7.1 G/DL (ref 5.7–8.2)
RBC # BLD AUTO: 5.37 X10(6)UL
RHEUMATOID FACT SERPL-ACNC: <10 IU/ML (ref ?–14)
SODIUM SERPL-SCNC: 141 MMOL/L (ref 136–145)
TSI SER-ACNC: 1.84 MIU/ML (ref 0.55–4.78)
VIT B12 SERPL-MCNC: >2000 PG/ML (ref 211–911)
VIT D+METAB SERPL-MCNC: 28.6 NG/ML (ref 30–100)
WBC # BLD AUTO: 6.3 X10(3) UL (ref 4–11)

## 2024-02-21 PROCEDURE — 80053 COMPREHEN METABOLIC PANEL: CPT

## 2024-02-21 PROCEDURE — 99214 OFFICE O/P EST MOD 30 MIN: CPT | Performed by: INTERNAL MEDICINE

## 2024-02-21 PROCEDURE — 85652 RBC SED RATE AUTOMATED: CPT

## 2024-02-21 PROCEDURE — 86431 RHEUMATOID FACTOR QUANT: CPT

## 2024-02-21 PROCEDURE — 83036 HEMOGLOBIN GLYCOSYLATED A1C: CPT

## 2024-02-21 PROCEDURE — 36415 COLL VENOUS BLD VENIPUNCTURE: CPT

## 2024-02-21 PROCEDURE — 85025 COMPLETE CBC W/AUTO DIFF WBC: CPT

## 2024-02-21 PROCEDURE — 82607 VITAMIN B-12: CPT

## 2024-02-21 PROCEDURE — 84443 ASSAY THYROID STIM HORMONE: CPT

## 2024-02-21 PROCEDURE — 86140 C-REACTIVE PROTEIN: CPT

## 2024-02-21 PROCEDURE — 82306 VITAMIN D 25 HYDROXY: CPT

## 2024-02-21 NOTE — PROGRESS NOTES
Subjective:     Patient ID: Triny Head is a 58 year old female.  Presents for evaluation of multiple concerns    HPI  Patient states that she has been feeling more tired than usual, getting enough sleep, her mother passed and now she has more time for herself.  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  Working with the weight loss team taking medication so far no significant weight loss, bothered by pain in the joints of the right hand also numbness tingling in the right hand, EMG from 2021 showed carpal tunnel syndrome mild to moderate, she had already surgery on the left wrist in the past.  Occasional right armpit pain, pain substernal when she is eating.  States that at times food does not want to go down, has not had EGD for many years.  Next colonoscopy in 2028, last 1 was performed at [facility] July 2023.    Current Outpatient Medications   Medication Sig Dispense Refill    Phentermine HCl 15 MG Oral Cap Take 1 capsule (15 mg total) by mouth every morning. 30 capsule 3    atenolol 50 MG Oral Tab Take 1 tablet (50 mg total) by mouth daily. 90 tablet 3    losartan 50 MG Oral Tab Take 1 tablet (50 mg total) by mouth daily. 90 tablet 3    amitriptyline 10 MG Oral Tab Take  1-2 tab po at bedtime as needed 180 tablet 0    hydroCHLOROthiazide 25 MG Oral Tab Take 1 tablet (25 mg total) by mouth daily. 90 tablet 3    Omeprazole 40 MG Oral Capsule Delayed Release Take 1 capsule (40 mg total) by mouth daily. Take 1 capsule by mouth daily before breakfast. 90 capsule 3    CYCLOBENZAPRINE 10 MG Oral Tab Take 1 tablet by mouth nightly 90 tablet 0    KETOCONAZOLE 2 % External Shampoo APPLY TO SCALP 2 TIMES PER WEEK 120 mL 0    ketoconazole 2 % External Cream APPLY  CREAM TOPICALLY TO AFFECTED AREA TWICE DAILY 30 g 0    Multiple Vitamin (MULTI-VITAMIN) Oral Tab Take 1 tablet by mouth daily.      Aspirin 81 MG Oral Tab Take 1  tablet (81 mg total) by mouth daily.       Allergies:  Allergies   Allergen Reactions    Latex      Added based on information entered during case entry, please review and add reactions, type, and severity as needed    Morphine Sulfate ITCHING       Past Medical History:   Diagnosis Date    Bronchitis     Cardiomyopathy (HCC)     \"enlarged heart\" per pt    Fibromyalgia     GERD (gastroesophageal reflux disease) 2019    repeat EGD in  due to esophagitis    High blood pressure     Tubular adenoma of colon 2019    repeat CLN in - sees Dr. Allen at [facility]      Past Surgical History:   Procedure Laterality Date          COLONOSCOPY N/A 2019    Procedure: COLONOSCOPY;  Surgeon: SARAH Pina MD;  Location: [facility] ENDOSCOPY    COLONOSCOPY N/A 2020    Procedure: COLONOSCOPY;  Surgeon: SARAH Pina MD;  Location: [facility] ENDOSCOPY    CYST ASPIRATION RIGHT  2014    HYSTERECTOMY      38    DEBRA LOCALIZATION WIRE 1 SITE RIGHT (CPT=19281)      NEEDLE BIOPSY RIGHT      in her 20's      Family History   Problem Relation Age of Onset    Breast Cancer Paternal Aunt         in her 50's      Social History:   Social History     Socioeconomic History    Marital status: Single   Tobacco Use    Smoking status: Never    Smokeless tobacco: Never   Vaping Use    Vaping Use: Never used   Substance and Sexual Activity    Alcohol use: Yes     Comment: occ    Drug use: Never        /82 (BP Location: Left arm, Patient Position: Sitting, Cuff Size: large)   Pulse 62   Resp 16   Ht 5' 3\" (1.6 m)   Wt 216 lb (98 kg)   BMI 38.26 kg/m²    Physical Exam  Constitutional:       Appearance: Normal appearance.   HENT:      Head: Normocephalic and atraumatic.   Eyes:      General: No scleral icterus.     Extraocular Movements: Extraocular movements intact.      Conjunctiva/sclera: Conjunctivae normal.      Pupils: Pupils are equal, round, and reactive to light.   Cardiovascular:      Rate and Rhythm: Normal rate  and regular rhythm.      Heart sounds: No murmur heard.     No gallop.   Pulmonary:      Effort: Pulmonary effort is normal.      Breath sounds: No wheezing or rhonchi.   Musculoskeletal:      Cervical back: Normal range of motion and neck supple.   Lymphadenopathy:      Head:      Right side of head: No submental, submandibular, posterior auricular or occipital adenopathy.      Left side of head: No submental, submandibular, posterior auricular or occipital adenopathy.      Cervical: No cervical adenopathy.      Right cervical: No superficial or deep cervical adenopathy.     Left cervical: No superficial or deep cervical adenopathy.   Skin:     General: Skin is warm.      Coloration: Skin is not jaundiced.   Neurological:      General: No focal deficit present.      Mental Status: She is alert and oriented to person, place, and time. Mental status is at baseline.   Psychiatric:         Mood and Affect: Mood normal.         Behavior: Behavior normal.         Thought Content: Thought content normal.         Assessment & Plan:   1. Arthralgia of both hands rule out rheumatoid arthritis, see rheumatology will check inflammatory markers   2. Carpal tunnel syndrome of right wrist EMG right upper extremity ordered   3. Other fatigue etiology not clear will will rule out hypothyroidism, connective tissue disorder may need scans of the body if symptoms persist   4. Encounter for vitamin deficiency screening    5. Screening mammogram for breast cancer    6. Prediabetes check hemoglobin A1c, continue low carbohydrate diet   7.      Hypertension controlled continue current medication follow-up with nephrology as planned, low-salt diet and adequate hydration recommended    Orders Placed This Encounter   Procedures    CBC With Differential With Platelet    Comp Metabolic Panel (14)    Sed Rate, Westergren (Automated)    C-Reactive Protein    TSH W Reflex To Free T4    Vitamin D    Vitamin B12    Rheumatoid Arthritis Factor     Hemoglobin A1C       Meds This Visit:  Requested Prescriptions      No prescriptions requested or ordered in this encounter       Imaging & Referrals:  RHEUMATOLOGY - INTERNAL  DEBRA ISHAN 2D+3D SCREENING BILAT (CPT=77067/50191)   Follow-up in 3 months

## 2024-02-23 ENCOUNTER — TELEPHONE (OUTPATIENT)
Dept: INTERNAL MEDICINE CLINIC | Facility: CLINIC | Age: 59
End: 2024-02-23

## 2024-02-23 NOTE — TELEPHONE ENCOUNTER
Dr. Danielle- patient calling to clarify:    She does not currently take a Vitamin D3 supplement. Would you like her to start at 2000? Also, would you like her to repeat a Vitamin D3 blood test after doing so?  For the Potassium, [facility]    Thank you.  She already has an appointment with a rheumatologist            Diabetic test hemoglobin A1c 6.4 slightly higher than 6 months ago, 6.53: Diabetes, work on the weight loss low carbohydrate diet to prevent progression to diabetes, higher than normal vitamin B12 level if you take supplement you can stop for 6 to 12 months and restart periodically afterwards.  Vitamin D level slightly lower than normal please continue to take vitamin D3 2000 units daily indefinitely to maintain good level.  No anemia, slightly abnormal but stable kidney function test, low potassium level.  I will send prescription for potassium supplement take it for 2 weeks and do blood test again BMP I will place order.  Normal thyroid function test, 2 inflammatory markers sed rate and C-reactive protein elevated, please see rheumatologist as we discussed.  Rheumatoid factor is negative.  Feel free to give my office a call if you have questions

## 2024-02-24 NOTE — TELEPHONE ENCOUNTER
Left message for the patient to start vitamin D3 2000 units daily, potassium chloride 10 mEq 1 tablet daily sent to the pharmacy check blood test in 10 days after starting potassium supplement

## 2024-03-18 ENCOUNTER — LAB ENCOUNTER (OUTPATIENT)
Dept: LAB | Age: 59
End: 2024-03-18
Attending: INTERNAL MEDICINE
Payer: MEDICAID

## 2024-03-18 ENCOUNTER — OFFICE VISIT (OUTPATIENT)
Dept: INTERNAL MEDICINE CLINIC | Facility: CLINIC | Age: 59
End: 2024-03-18

## 2024-03-18 VITALS
TEMPERATURE: 98 F | WEIGHT: 213.13 LBS | HEART RATE: 57 BPM | OXYGEN SATURATION: 97 % | SYSTOLIC BLOOD PRESSURE: 124 MMHG | HEIGHT: 63 IN | DIASTOLIC BLOOD PRESSURE: 83 MMHG | BODY MASS INDEX: 37.76 KG/M2

## 2024-03-18 DIAGNOSIS — E87.6 HYPOKALEMIA: ICD-10-CM

## 2024-03-18 DIAGNOSIS — T16.2XXA FOREIGN BODY OF LEFT EAR, INITIAL ENCOUNTER: Primary | ICD-10-CM

## 2024-03-18 LAB
ANION GAP SERPL CALC-SCNC: 7 MMOL/L (ref 0–18)
BUN BLD-MCNC: 26 MG/DL (ref 9–23)
BUN/CREAT SERPL: 21.7 (ref 10–20)
CALCIUM BLD-MCNC: 10.1 MG/DL (ref 8.7–10.4)
CHLORIDE SERPL-SCNC: 106 MMOL/L (ref 98–112)
CO2 SERPL-SCNC: 29 MMOL/L (ref 21–32)
CREAT BLD-MCNC: 1.2 MG/DL
EGFRCR SERPLBLD CKD-EPI 2021: 52 ML/MIN/1.73M2 (ref 60–?)
FASTING STATUS PATIENT QL REPORTED: NO
GLUCOSE BLD-MCNC: 97 MG/DL (ref 70–99)
OSMOLALITY SERPL CALC.SUM OF ELEC: 299 MOSM/KG (ref 275–295)
POTASSIUM SERPL-SCNC: 3.8 MMOL/L (ref 3.5–5.1)
SODIUM SERPL-SCNC: 142 MMOL/L (ref 136–145)

## 2024-03-18 PROCEDURE — 80048 BASIC METABOLIC PNL TOTAL CA: CPT

## 2024-03-18 PROCEDURE — 99213 OFFICE O/P EST LOW 20 MIN: CPT | Performed by: INTERNAL MEDICINE

## 2024-03-18 PROCEDURE — 36415 COLL VENOUS BLD VENIPUNCTURE: CPT

## 2024-03-18 RX ORDER — CIPROFLOXACIN AND DEXAMETHASONE 3; 1 MG/ML; MG/ML
4 SUSPENSION/ DROPS AURICULAR (OTIC) 2 TIMES DAILY
Qty: 7.5 ML | Refills: 0 | Status: SHIPPED | OUTPATIENT
Start: 2024-03-18 | End: 2024-03-21

## 2024-03-18 NOTE — PROGRESS NOTES
Triny Head is a 58 year old female.  Chief Complaint   Patient presents with    Ear Problem     Left ear has qtip stuck in the ear. No pain only discomfort.      HPI:   Patient presented today with c/o retained Q tip soft part in Left ear. She was cleaning her ear when she noticed that the soft part was missing. She tried to clean it with a abby pin but nothing came out. This incident happened one day ago. Denies any pain in the ear, no hearing impairment.     Current Outpatient Medications   Medication Sig Dispense Refill    ciprofloxacin-dexamethasone (CIPRODEX) 0.3-0.1 % Otic Suspension Place 4 drops into the right ear 2 (two) times daily for 3 days. 7.5 mL 0    Potassium Chloride ER 10 MEQ Oral Tab CR Take 1 tablet (10 mEq total) by mouth daily. 30 tablet 0    Phentermine HCl 15 MG Oral Cap Take 1 capsule (15 mg total) by mouth every morning. 30 capsule 3    atenolol 50 MG Oral Tab Take 1 tablet (50 mg total) by mouth daily. 90 tablet 3    losartan 50 MG Oral Tab Take 1 tablet (50 mg total) by mouth daily. 90 tablet 3    amitriptyline 10 MG Oral Tab Take  1-2 tab po at bedtime as needed 180 tablet 0    hydroCHLOROthiazide 25 MG Oral Tab Take 1 tablet (25 mg total) by mouth daily. 90 tablet 3    CYCLOBENZAPRINE 10 MG Oral Tab Take 1 tablet by mouth nightly 90 tablet 0    KETOCONAZOLE 2 % External Shampoo APPLY TO SCALP 2 TIMES PER WEEK 120 mL 0    ketoconazole 2 % External Cream APPLY  CREAM TOPICALLY TO AFFECTED AREA TWICE DAILY 30 g 0    Multiple Vitamin (MULTI-VITAMIN) Oral Tab Take 1 tablet by mouth daily.      Aspirin 81 MG Oral Tab Take 1 tablet (81 mg total) by mouth daily.        Past Medical History:   Diagnosis Date    Bronchitis     Cardiomyopathy (HCC)     \"enlarged heart\" per pt    Fibromyalgia     GERD (gastroesophageal reflux disease) 2019    repeat EGD in 2020 due to esophagitis    High blood pressure     Tubular adenoma of colon 2019    repeat CLN in 2023- sees Dr. Allen at [facility]       Past Surgical History:   Procedure Laterality Date          COLONOSCOPY N/A 2019    Procedure: COLONOSCOPY;  Surgeon: SARAH Pina MD;  Location: [facility] ENDOSCOPY    COLONOSCOPY N/A 2020    Procedure: COLONOSCOPY;  Surgeon: SARAH Pina MD;  Location: [facility] ENDOSCOPY    CYST ASPIRATION RIGHT  2014    HYSTERECTOMY      38    DEBRA LOCALIZATION WIRE 1 SITE RIGHT (CPT=19281)  2005    NEEDLE BIOPSY RIGHT      in her 20's      Social History:  Social History     Socioeconomic History    Marital status: Single   Tobacco Use    Smoking status: Never    Smokeless tobacco: Never   Vaping Use    Vaping Use: Never used   Substance and Sexual Activity    Alcohol use: Yes     Comment: occ    Drug use: Never      Family History   Problem Relation Age of Onset    Breast Cancer Paternal Aunt         in her 50's      Allergies   Allergen Reactions    Latex      Added based on information entered during case entry, please review and add reactions, type, and severity as needed    Morphine Sulfate ITCHING        REVIEW OF SYSTEMS:   Review of Systems   Review of Systems   Constitutional: Negative for activity change, appetite change and fever.   HENT: Negative for congestion and voice change.    Respiratory: Negative for cough and shortness of breath.    Cardiovascular: Negative for chest pain.   Gastrointestinal: Negative for abdominal distention, abdominal pain and vomiting.   Genitourinary: Negative for hematuria.   Skin: Negative for wound.   Psychiatric/Behavioral: Negative for behavioral problems.   Wt Readings from Last 5 Encounters:   24 213 lb 1.6 oz (96.7 kg)   24 216 lb (98 kg)   23 215 lb (97.5 kg)   10/04/23 215 lb (97.5 kg)   23 213 lb 9.6 oz (96.9 kg)     Body mass index is 37.75 kg/m².      EXAM:   /83   Pulse 57   Temp 98.2 °F (36.8 °C)   Ht 5' 3\" (1.6 m)   Wt 213 lb 1.6 oz (96.7 kg)   SpO2 97%   BMI 37.75 kg/m²   Physical Exam   Constitutional:       Appearance:  Normal appearance.   HENT:      Head: Normocephalic.     Ear- L ear with redness and irritation, no discharge or foreign objects seen  Eyes:      Conjunctiva/sclera: Conjunctivae normal.   Cardiovascular:      Rate and Rhythm: Normal rate and regular rhythm.      Heart sounds: Normal heart sounds. No murmur heard.  Pulmonary:      Effort: Pulmonary effort is normal.      Breath sounds: Normal breath sounds. No rhonchi or rales.   Abdominal:      General: Bowel sounds are normal.      Palpations: Abdomen is soft.      Tenderness: There is no abdominal tenderness.   Musculoskeletal:      Cervical back: Neck supple.      Right lower leg: No edema.      Left lower leg: No edema.   Skin:     General: Skin is warm and dry.   Neurological:      General: No focal deficit present.      Mental Status: He is alert and oriented to person, place, and time. Mental status is at baseline.   Psychiatric:         Mood and Affect: Mood normal.         Behavior: Behavior normal.       ASSESSMENT AND PLAN:   1. Foreign body of left ear, initial encounter  - left ear does not have any foreign body on exam but appears to have otitis externa likely secondary to use of abby pin  - avoid foreign objects in ear  - ciprofloxacin-dexamethasone (CIPRODEX) 0.3-0.1 % Otic Suspension; Place 4 drops into the right ear 2 (two) times daily for 3 days.  Dispense: 7.5 mL; Refill: 0      The patient indicates understanding of these issues and agrees to the plan.      Gertrude Smith MD

## 2024-04-01 ENCOUNTER — TELEPHONE (OUTPATIENT)
Dept: NEUROLOGY | Facility: CLINIC | Age: 59
End: 2024-04-01

## 2024-04-01 ENCOUNTER — OFFICE VISIT (OUTPATIENT)
Dept: NEUROLOGY | Facility: CLINIC | Age: 59
End: 2024-04-01
Payer: MEDICAID

## 2024-04-01 ENCOUNTER — LAB ENCOUNTER (OUTPATIENT)
Dept: LAB | Age: 59
End: 2024-04-01
Attending: Other
Payer: MEDICAID

## 2024-04-01 VITALS — BODY MASS INDEX: 37.27 KG/M2 | WEIGHT: 213 LBS | HEIGHT: 63.5 IN

## 2024-04-01 DIAGNOSIS — R25.2 LEG CRAMPS: Primary | ICD-10-CM

## 2024-04-01 DIAGNOSIS — G31.84 MCI (MILD COGNITIVE IMPAIRMENT) WITH MEMORY LOSS: ICD-10-CM

## 2024-04-01 DIAGNOSIS — G56.01 CARPAL TUNNEL SYNDROME OF RIGHT WRIST: ICD-10-CM

## 2024-04-01 DIAGNOSIS — R20.0 NUMBNESS AND TINGLING OF BOTH LEGS: ICD-10-CM

## 2024-04-01 DIAGNOSIS — R20.2 NUMBNESS AND TINGLING OF BOTH LEGS: ICD-10-CM

## 2024-04-01 LAB
FOLATE SERPL-MCNC: 15 NG/ML (ref 5.4–?)
T PALLIDUM AB SER QL IA: NONREACTIVE
TSI SER-ACNC: 1.52 MIU/ML (ref 0.55–4.78)
VIT B12 SERPL-MCNC: 1575 PG/ML (ref 211–911)

## 2024-04-01 PROCEDURE — 82746 ASSAY OF FOLIC ACID SERUM: CPT

## 2024-04-01 PROCEDURE — 86780 TREPONEMA PALLIDUM: CPT

## 2024-04-01 PROCEDURE — 84443 ASSAY THYROID STIM HORMONE: CPT

## 2024-04-01 PROCEDURE — 36415 COLL VENOUS BLD VENIPUNCTURE: CPT

## 2024-04-01 PROCEDURE — 82607 VITAMIN B-12: CPT

## 2024-04-01 PROCEDURE — 99214 OFFICE O/P EST MOD 30 MIN: CPT | Performed by: OTHER

## 2024-04-01 NOTE — PROGRESS NOTES
Neurology Initial Visit     Referred By: Dr. Frazier ref. provider found    Chief Complaint:   Chief Complaint   Patient presents with    Neurologic Problem     LOV 10/21/2021 Patient present with numbness, dropping things in her right hand.Patient also states she also has memory challenges. Patient states she goes to same location every day and sometimes forget where she is at. Patient states she noticed her memory challenges about a year ago, it doesn't happen often but it happen.       HPI:     Triny Head is a 58 year old female, who presents for rambling and paresthesias.  Apparently patient has had history of carpal tunnel syndrome, she even had a surgery back some years ago on the left side.  Back pain since at least 2020 or so patient started develop this feeling of cramping in her legs especially, some cramping in her hands as well.  Especially when she is at rest however she is trying to go to sleep.  She says needs to get out of the car to walk around.  At night she frequently gets up to walk around the coffee table in the bedroom.  Sometimes cramping makes her feel as if she is spreading her toes or fingers.  CPK in June was normal but then it was elevated in September.  History of fibromyalgia, hypertension.  Patient also has been developing some feeling of tingling and numbness in the fingertips of both hands, occasionally dropping things from her hands.     EEG showed evidence of mild to moderate carpal tunnel on the right side.  No significant neuropathy.    Patient was lost to follow-up for 3 years and came back.  Her hand weakness was getting worse.  She continued to have cramps in the legs.  But she did not do gabapentin anymore.  Also she was concerned that she was forgetful.    Past Medical History:   Diagnosis Date    Bronchitis     Cardiomyopathy (HCC)     \"enlarged heart\" per pt    Fibromyalgia     GERD (gastroesophageal reflux disease) 2019    repeat EGD in 2020 due to esophagitis    High  blood pressure     Tubular adenoma of colon     repeat CLN in - sees Dr. Allen at [facility]       Past Surgical History:   Procedure Laterality Date          COLONOSCOPY N/A 2019    Procedure: COLONOSCOPY;  Surgeon: SARAH Pina MD;  Location: [facility] ENDOSCOPY    COLONOSCOPY N/A 2020    Procedure: COLONOSCOPY;  Surgeon: SARAH Pina MD;  Location: [facility] ENDOSCOPY    CYST ASPIRATION RIGHT      HYSTERECTOMY      38    DEBRA LOCALIZATION WIRE 1 SITE RIGHT (CPT=19281)      NEEDLE BIOPSY RIGHT      in her 20's       Social history:  History   Smoking Status    Never   Smokeless Tobacco    Never     History   Alcohol Use    Yes     Comment: occ     History   Drug Use Unknown       Family History   Problem Relation Age of Onset    Breast Cancer Paternal Aunt         in her 50's         Current Outpatient Medications:     Potassium Chloride ER 10 MEQ Oral Tab CR, Take 1 tablet (10 mEq total) by mouth daily., Disp: 30 tablet, Rfl: 0    Phentermine HCl 15 MG Oral Cap, Take 1 capsule (15 mg total) by mouth every morning., Disp: 30 capsule, Rfl: 3    atenolol 50 MG Oral Tab, Take 1 tablet (50 mg total) by mouth daily., Disp: 90 tablet, Rfl: 3    losartan 50 MG Oral Tab, Take 1 tablet (50 mg total) by mouth daily., Disp: 90 tablet, Rfl: 3    hydroCHLOROthiazide 25 MG Oral Tab, Take 1 tablet (25 mg total) by mouth daily., Disp: 90 tablet, Rfl: 3    KETOCONAZOLE 2 % External Shampoo, APPLY TO SCALP 2 TIMES PER WEEK, Disp: 120 mL, Rfl: 0    Multiple Vitamin (MULTI-VITAMIN) Oral Tab, Take 1 tablet by mouth daily., Disp: , Rfl:     amitriptyline 10 MG Oral Tab, Take  1-2 tab po at bedtime as needed, Disp: 180 tablet, Rfl: 0    CYCLOBENZAPRINE 10 MG Oral Tab, Take 1 tablet by mouth nightly, Disp: 90 tablet, Rfl: 0    ketoconazole 2 % External Cream, APPLY  CREAM TOPICALLY TO AFFECTED AREA TWICE DAILY, Disp: 30 g, Rfl: 0    Aspirin 81 MG Oral Tab, Take 1 tablet (81 mg total) by mouth daily., Disp: ,  Rfl:     Allergies   Allergen Reactions    Latex      Added based on information entered during case entry, please review and add reactions, type, and severity as needed    Morphine Sulfate ITCHING       ROS:   As in HPI, the rest of the 14 system review was done and was negative      Physical Exam:  Vitals:    04/01/24 1518   Weight: 213 lb (96.6 kg)   Height: 63.5\"       General: No apparent distress, well nourished, well groomed.  Head- Normocephalic, atraumatic  Eyes- No redness or swelling  ENT- Hearing intake, normal glutition  Neck- No masses or adenopathy  Cv: pulses were palpable and normal, no cyanosis or edema     Neurological:     Mental Status- Alert MoCA was done and was 21/30 and showed deficiencies in multiple domains.      Cranial Nerves:    VII. Face symmetric, no facial weakness  VIII. Hearing intact to whisper.  IX. Pallet elevates symmetrically.  XI. Shoulder shrug is intact  XII. Tongue is midline    Motor Exam:  Muscle tone normal  No atrophy or fasciculations  Strength- upper extremities 5/5 proximally and distally                   Rapid alternating movements intact    Gait:  Normal posture  Normal physiologic      Labs:    Lab Results   Component Value Date    TSH 1.836 02/21/2024     Lab Results   Component Value Date    HDL 76 (H) 08/09/2023     (H) 08/09/2023    TRIG 47 08/09/2023     Lab Results   Component Value Date    HGB 13.1 02/21/2024    HCT 41.5 02/21/2024    MCV 77.3 (L) 02/21/2024    WBC 6.3 02/21/2024    .0 02/21/2024      Lab Results   Component Value Date    BUN 26 (H) 03/18/2024    CA 10.1 03/18/2024    ALT 21 02/21/2024    AST 23 02/21/2024    ALB 4.2 02/21/2024     03/18/2024    K 3.8 03/18/2024     03/18/2024    CO2 29.0 03/18/2024      I have reviewed labs.      Assessment   1. Paresthesias  Possibility of some return of carpal tunnel syndrome.  EMG will be ordered.  In the meantime patient also will be referred to a hand surgeon    2. Cramps,  extremity  Strong possibility of possible restless leg syndrome, but some dystonic features is being described as well, extensive workup was not revealing.  Patient decided not to do any symptomatic management at this point.    3.  Memory loss    MRI of the brain, neuropsychological testing and blood work will be done.           Education and counseling provided to patient. Instructed patient to call my office or seek medical attention immediately if symptoms worsen.  Patient verbalized understanding of information given. All questions were answered. All side effects of drugs were discussed.     Return to clinic in: Return in about 3 months (around 7/1/2024).    Aldo De La Rosa MD

## 2024-04-01 NOTE — TELEPHONE ENCOUNTER
Pt called asking if she could switch her care to a different provider in the office. Per pt she stated that she doesn't want to see dr lizarraga anymore, and stated \"the people there are rude\" Please give pt a call to discuss further

## 2024-04-01 NOTE — TELEPHONE ENCOUNTER
I spoke with the patient and asked if there was a reason that she wanted to change providers and she stated that she wanted to see someone who practiced closer to [place].  I informed her the [place] or [place] location is the closest and if she wants to switch providers, we are scheduling out to June/July at the moment.  The patient stated she is not feeling well today and was hoping not to have to travel too far. Pt states that she will continue her care with Dr. De La Rosa.  Reviewed and electronically signed by: STAN Airas

## 2024-04-05 ENCOUNTER — OFFICE VISIT (OUTPATIENT)
Dept: RHEUMATOLOGY | Facility: CLINIC | Age: 59
End: 2024-04-05
Payer: MEDICAID

## 2024-04-05 ENCOUNTER — LAB ENCOUNTER (OUTPATIENT)
Dept: LAB | Age: 59
End: 2024-04-05
Attending: INTERNAL MEDICINE
Payer: MEDICAID

## 2024-04-05 ENCOUNTER — TELEPHONE (OUTPATIENT)
Age: 59
End: 2024-04-05

## 2024-04-05 VITALS — RESPIRATION RATE: 16 BRPM | HEIGHT: 63.5 IN | WEIGHT: 213 LBS | BODY MASS INDEX: 37.27 KG/M2

## 2024-04-05 DIAGNOSIS — R79.82 CRP ELEVATED: Primary | ICD-10-CM

## 2024-04-05 DIAGNOSIS — Z83.2 FAMILY HISTORY OF AUTOIMMUNE DISORDER: ICD-10-CM

## 2024-04-05 DIAGNOSIS — N18.9 CHRONIC KIDNEY DISEASE, UNSPECIFIED CKD STAGE: ICD-10-CM

## 2024-04-05 DIAGNOSIS — E87.6 HYPOKALEMIA: ICD-10-CM

## 2024-04-05 DIAGNOSIS — M06.4 INFLAMMATORY POLYARTHRITIS (HCC): ICD-10-CM

## 2024-04-05 DIAGNOSIS — R70.0 ESR RAISED: ICD-10-CM

## 2024-04-05 DIAGNOSIS — M06.4 INFLAMMATORY POLYARTHRITIS (HCC): Primary | ICD-10-CM

## 2024-04-05 DIAGNOSIS — R79.82 CRP ELEVATED: ICD-10-CM

## 2024-04-05 LAB
ANION GAP SERPL CALC-SCNC: 5 MMOL/L (ref 0–18)
BUN BLD-MCNC: 21 MG/DL (ref 9–23)
BUN/CREAT SERPL: 17.9 (ref 10–20)
C3 SERPL-MCNC: 190.9 MG/DL (ref 90–170)
C4 SERPL-MCNC: 49.5 MG/DL (ref 12–36)
CALCIUM BLD-MCNC: 10 MG/DL (ref 8.7–10.4)
CHLORIDE SERPL-SCNC: 107 MMOL/L (ref 98–112)
CO2 SERPL-SCNC: 30 MMOL/L (ref 21–32)
CREAT BLD-MCNC: 1.17 MG/DL
CRP SERPL-MCNC: 1.9 MG/DL (ref ?–1)
EGFRCR SERPLBLD CKD-EPI 2021: 53 ML/MIN/1.73M2 (ref 60–?)
ERYTHROCYTE [SEDIMENTATION RATE] IN BLOOD: 62 MM/HR
FASTING STATUS PATIENT QL REPORTED: YES
GLUCOSE BLD-MCNC: 96 MG/DL (ref 70–99)
HAV IGM SER QL: NONREACTIVE
HBV CORE IGM SER QL: NONREACTIVE
HBV SURFACE AG SERPL QL IA: NONREACTIVE
HCV AB SERPL QL IA: NONREACTIVE
OSMOLALITY SERPL CALC.SUM OF ELEC: 297 MOSM/KG (ref 275–295)
POTASSIUM SERPL-SCNC: 4 MMOL/L (ref 3.5–5.1)
SODIUM SERPL-SCNC: 142 MMOL/L (ref 136–145)

## 2024-04-05 PROCEDURE — 86200 CCP ANTIBODY: CPT | Performed by: INTERNAL MEDICINE

## 2024-04-05 PROCEDURE — 86225 DNA ANTIBODY NATIVE: CPT

## 2024-04-05 PROCEDURE — 86160 COMPLEMENT ANTIGEN: CPT

## 2024-04-05 PROCEDURE — 85652 RBC SED RATE AUTOMATED: CPT

## 2024-04-05 PROCEDURE — 80074 ACUTE HEPATITIS PANEL: CPT

## 2024-04-05 PROCEDURE — 36415 COLL VENOUS BLD VENIPUNCTURE: CPT

## 2024-04-05 PROCEDURE — 99215 OFFICE O/P EST HI 40 MIN: CPT | Performed by: INTERNAL MEDICINE

## 2024-04-05 PROCEDURE — 80048 BASIC METABOLIC PNL TOTAL CA: CPT

## 2024-04-05 PROCEDURE — 86140 C-REACTIVE PROTEIN: CPT

## 2024-04-05 PROCEDURE — 86480 TB TEST CELL IMMUN MEASURE: CPT

## 2024-04-05 PROCEDURE — 86038 ANTINUCLEAR ANTIBODIES: CPT | Performed by: INTERNAL MEDICINE

## 2024-04-05 RX ORDER — PREDNISONE 5 MG/1
TABLET ORAL
Qty: 30 TABLET | Refills: 0 | Status: SHIPPED | OUTPATIENT
Start: 2024-04-05 | End: 2024-04-16

## 2024-04-05 NOTE — PROGRESS NOTES
RHEUMATOLOGY CLINIC- NEW PATIENT    Triny Head is a 58 year old female.    ASSESSMENT/PLAN:       ICD-10-CM    1. Inflammatory polyarthritis (HCC)  M06.4 Hepatitis Panel, Acute (4)     Quantiferon TB Plus     C-Reactive Protein     Sed Rate, Westergren (Automated)     Miscellaneous Testing     Anti-dsDNA Antibody, IgG     Anti-Nuclear Antibody (ELYSE) by IFA, Reflex Titer + Specific Antibodies     Complement C3, Serum     Complement C4, Serum     Cyclic Citrullinate Pep. IGG      2. Chronic kidney disease, unspecified CKD stage  N18.9 Hepatitis Panel, Acute (4)     Quantiferon TB Plus     C-Reactive Protein     Sed Rate, Westergren (Automated)     Miscellaneous Testing     Anti-dsDNA Antibody, IgG     Anti-Nuclear Antibody (ELYSE) by IFA, Reflex Titer + Specific Antibodies     Complement C3, Serum     Complement C4, Serum     Cyclic Citrullinate Pep. IGG      3. Family history of autoimmune disorder  Z83.2 Hepatitis Panel, Acute (4)     Quantiferon TB Plus     C-Reactive Protein     Sed Rate, Westergren (Automated)     Miscellaneous Testing     Anti-dsDNA Antibody, IgG     Anti-Nuclear Antibody (ELYSE) by IFA, Reflex Titer + Specific Antibodies     Complement C3, Serum     Complement C4, Serum     Cyclic Citrullinate Pep. IGG      4. ESR raised  R70.0 Hepatitis Panel, Acute (4)     Quantiferon TB Plus     C-Reactive Protein     Sed Rate, Westergren (Automated)     Miscellaneous Testing     Anti-dsDNA Antibody, IgG     Anti-Nuclear Antibody (ELYSE) by IFA, Reflex Titer + Specific Antibodies     Complement C3, Serum     Complement C4, Serum     Cyclic Citrullinate Pep. IGG      5. CRP elevated  R79.82 Hepatitis Panel, Acute (4)     Quantiferon TB Plus     C-Reactive Protein     Sed Rate, Westergren (Automated)     Miscellaneous Testing     Anti-dsDNA Antibody, IgG     Anti-Nuclear Antibody (ELYSE) by IFA, Reflex Titer + Specific Antibodies     Complement C3, Serum     Complement C4, Serum     Cyclic Citrullinate Pep. IGG         DISCUSSION:  Patient presents as a new outpatient referral from her PCP for evaluation of hand pain with synovitis on exam consistent with an underlying inflammatory arthropathy.  Prior lab work reviewed and notable for elevated inflammatory markers and negative RF.  Patient also has a family history notable for mother with SLE.  Therefore, we will workup patient further as above.  In the meantime, will trial p.o. p.o. prednisone taper.    PLAN:  -P.o. prednisone taper over 12 days starting at 20 mg every morning.  Discussed with patient to monitor symptoms at various doses and to contact office should her symptoms flare, as some patients may require maintenance dosing.  - Patient to obtain the above nonfasting lab work  - Consult/evaluation communicated with referring physician/provider.    Patient to follow-up in 5 to 6 weeks with workup completed prior.    Carlos Davis DO  4/5/2024   9:48 AM      Spent 60 minutes including about 40 minutes of face to face time obtaining history, evaluating patient, and discussing treatment options as well as reviewing prior notes for other providers, reviewing labs, and completing documentation        HPI:     Chief Complaint   Patient presents with    Consult     Arthralgia of both hands, Elevated C-RP and Sed Rate       I had the pleasure of seeing Triny Head on 4/5/2024 as a new outpatient consultation for bilateral hand pain. The patient was originally referred by her PCP, Dr Raquel Danielle.     58 year old female w/ PMH HTN, GERD, CKD, carpal tunnel syndrome status post left wrist carpal tunnel release who presents to clinic today.  Patient noting years of bilateral cramping and numbness of her hands, however, symptoms of her left hand improved after her carpal tunnel surgery.  She has pain when waking up as well as with activity, affecting her whole hand.  Shaking out her hand helps alleviate the symptoms somewhat as well as as needed ibuprofen.  She is also  concerned that her hands look asymmetric.  ROS negative for unexplained fever, chills, photosensitive rash, hematuria, Raynaud's phenomena, sicca symptoms, oral/nasal ulcers, history of uveitis/iritis, pleurisy, history of clotting disorders.  Family history notable for mother with SLE.    Current Medications:  N/A    Medication History:  PRN Ibuprofen- helpful    Interval History:   See Above    HISTORY:  Past Medical History:   Diagnosis Date    Bronchitis     Cardiomyopathy (HCC)     \"enlarged heart\" per pt    Fibromyalgia     GERD (gastroesophageal reflux disease) 2019    repeat EGD in 2020 due to esophagitis    High blood pressure     Tubular adenoma of colon 2019    repeat CLN in 2023- sees Dr. Allen at [facility] Hx Reviewed   Family Hx Reviewed     Medications (Active prior to today's visit):  Current Outpatient Medications   Medication Sig Dispense Refill    Potassium Chloride ER 10 MEQ Oral Tab CR Take 1 tablet (10 mEq total) by mouth daily. 30 tablet 0    Phentermine HCl 15 MG Oral Cap Take 1 capsule (15 mg total) by mouth every morning. 30 capsule 3    atenolol 50 MG Oral Tab Take 1 tablet (50 mg total) by mouth daily. 90 tablet 3    losartan 50 MG Oral Tab Take 1 tablet (50 mg total) by mouth daily. 90 tablet 3    hydroCHLOROthiazide 25 MG Oral Tab Take 1 tablet (25 mg total) by mouth daily. 90 tablet 3    KETOCONAZOLE 2 % External Shampoo APPLY TO SCALP 2 TIMES PER WEEK 120 mL 0    ketoconazole 2 % External Cream APPLY  CREAM TOPICALLY TO AFFECTED AREA TWICE DAILY 30 g 0    Multiple Vitamin (MULTI-VITAMIN) Oral Tab Take 1 tablet by mouth daily.      Aspirin 81 MG Oral Tab Take 1 tablet (81 mg total) by mouth daily.         Allergies:  Allergies   Allergen Reactions    Latex      Added based on information entered during case entry, please review and add reactions, type, and severity as needed    Morphine Sulfate ITCHING         ROS:   Review of Systems   Constitutional:  Negative for  chills and fever.   HENT:  Negative for congestion, hearing loss, mouth sores, nosebleeds and trouble swallowing.    Eyes:  Negative for photophobia, pain, redness and visual disturbance.   Respiratory:  Negative for cough and shortness of breath.    Cardiovascular:  Negative for chest pain, palpitations and leg swelling.   Gastrointestinal:  Negative for abdominal pain, blood in stool, diarrhea and nausea.   Endocrine: Negative for cold intolerance and heat intolerance.   Genitourinary:  Negative for dysuria, frequency and hematuria.   Musculoskeletal:  Positive for arthralgias and joint swelling. Negative for back pain, gait problem, myalgias, neck pain and neck stiffness.   Skin:  Negative for color change and rash.   Neurological:  Positive for numbness. Negative for dizziness, weakness and headaches.   Psychiatric/Behavioral:  Negative for confusion and dysphoric mood.         PHYSICAL EXAM:     Constitutional:  Well developed, Well nourished, No acute distress  HENT:  Normocephalic, Atraumatic, Bilateral external ears normal, Oropharynx moist, No oral exudates.  Neck: Normal range of motion, No tenderness, Supple, No stridor.  Eyes:  PERRL, EOMI, Conjunctiva normal, No discharge.  Respiratory:  Normal breath sounds, No respiratory distress, No wheezing.  Cardiovascular:  Normal heart rate, Normal rhythm, No murmurs, No rubs, No gallops.  GI:  Bowel sounds normal, Soft, No tenderness, No masses, No pulsatile masses.  : No CVA tenderness.  Musculoskeletal:  A comprehensive 28 count joint exam was done and was negative for swelling or tenderness except as noted. Inspections for misalignment, asymmetry, crepitation, defects, tenderness, masses, nodules, effusions, range of motion, and stability in the upper and lower extremities bilaterally are all normal unless noted.           Integument:  Warm, Dry, No erythema, No rash.  Lymphatic:  No lymphadenopathy noted.  Neurologic:  Alert & oriented x 3, Normal motor  function, Normal sensory function, No focal deficits noted.  Psychiatric:  Affect normal, Judgment normal, Mood normal.    LABS:     Lab work reviewed and notable for:    4/1/2024:  B12 high  TSH 1.516 WNL, folic acid 15 WNL    3/18/2024:  BMP with BUN 26, CR 1.2    2/21/2024:  Hemoglobin A1c 6.4  RF less than 10  CRP 1.5 (high), ESR 63 (high)    CMP with BUN 22, CR 1.16, LFTs nonelevated, no gamma gap noted  CBC with normal WBC, Hgb, PLT    1/22/20:  ELYSE negative    CK2 100 (borderline high), aldolase 3.5 WNL  Imaging:       N/A

## 2024-04-08 LAB
CCP IGG SERPL-ACNC: 0.9 U/ML (ref 0–6.9)
DSDNA IGG SERPL IA-ACNC: 1.7 IU/ML
M TB IFN-G CD4+ T-CELLS BLD-ACNC: 0.02 IU/ML
M TB TUBERC IFN-G BLD QL: NEGATIVE
M TB TUBERC IGNF/MITOGEN IGNF CONTROL: >10 IU/ML
QFT TB1 AG MINUS NIL: 0 IU/ML
QFT TB2 AG MINUS NIL: 0 IU/ML

## 2024-04-09 LAB — NUCLEAR IGG TITR SER IF: NEGATIVE {TITER}

## 2024-04-22 ENCOUNTER — TELEPHONE (OUTPATIENT)
Dept: SURGERY | Facility: CLINIC | Age: 59
End: 2024-04-22

## 2024-04-22 NOTE — TELEPHONE ENCOUNTER
M for pt.  Dr Walters reviewed her EMG nerve test.  Pt has mild R CTS that can be treated non-surgically. Referred pt to physiatry, [phone].  Canceled appt in our office on 4/29/24.

## 2024-05-01 ENCOUNTER — ANESTHESIA EVENT (OUTPATIENT)
Dept: MRI IMAGING | Facility: HOSPITAL | Age: 59
End: 2024-05-01
Payer: MEDICAID

## 2024-05-02 ENCOUNTER — ANESTHESIA (OUTPATIENT)
Dept: MRI IMAGING | Facility: HOSPITAL | Age: 59
End: 2024-05-02
Payer: MEDICAID

## 2024-05-02 ENCOUNTER — HOSPITAL ENCOUNTER (OUTPATIENT)
Dept: MRI IMAGING | Facility: HOSPITAL | Age: 59
Discharge: HOME | End: 2024-05-02
Attending: Other
Payer: MEDICAID

## 2024-05-02 VITALS
BODY MASS INDEX: 34.39 KG/M2 | DIASTOLIC BLOOD PRESSURE: 78 MMHG | SYSTOLIC BLOOD PRESSURE: 143 MMHG | TEMPERATURE: 97 F | RESPIRATION RATE: 18 BRPM | HEART RATE: 63 BPM | HEIGHT: 66 IN | OXYGEN SATURATION: 95 % | WEIGHT: 214 LBS

## 2024-05-02 DIAGNOSIS — G31.84 MCI (MILD COGNITIVE IMPAIRMENT) WITH MEMORY LOSS: ICD-10-CM

## 2024-05-02 PROCEDURE — 70551 MRI BRAIN STEM W/O DYE: CPT | Performed by: OTHER

## 2024-05-02 RX ORDER — NALOXONE HYDROCHLORIDE 0.4 MG/ML
80 INJECTION, SOLUTION INTRAMUSCULAR; INTRAVENOUS; SUBCUTANEOUS AS NEEDED
Status: ACTIVE | OUTPATIENT
Start: 2024-05-02 | End: 2024-05-02

## 2024-05-02 RX ORDER — METOPROLOL TARTRATE 1 MG/ML
2.5 INJECTION, SOLUTION INTRAVENOUS ONCE
Status: DISCONTINUED | OUTPATIENT
Start: 2024-05-02 | End: 2024-05-04

## 2024-05-02 RX ORDER — SODIUM CHLORIDE, SODIUM LACTATE, POTASSIUM CHLORIDE, CALCIUM CHLORIDE 600; 310; 30; 20 MG/100ML; MG/100ML; MG/100ML; MG/100ML
INJECTION, SOLUTION INTRAVENOUS CONTINUOUS
Status: DISCONTINUED | OUTPATIENT
Start: 2024-05-02 | End: 2024-05-04

## 2024-05-02 RX ORDER — METOCLOPRAMIDE 10 MG/1
10 TABLET ORAL ONCE
Status: DISCONTINUED | OUTPATIENT
Start: 2024-05-02 | End: 2024-05-04

## 2024-05-02 RX ORDER — ONDANSETRON 2 MG/ML
4 INJECTION INTRAMUSCULAR; INTRAVENOUS EVERY 6 HOURS PRN
Status: DISCONTINUED | OUTPATIENT
Start: 2024-05-02 | End: 2024-05-04

## 2024-05-02 RX ORDER — LIDOCAINE HYDROCHLORIDE 10 MG/ML
INJECTION, SOLUTION EPIDURAL; INFILTRATION; INTRACAUDAL; PERINEURAL AS NEEDED
Status: DISCONTINUED | OUTPATIENT
Start: 2024-05-02 | End: 2024-05-02 | Stop reason: SURG

## 2024-05-02 RX ORDER — FAMOTIDINE 10 MG/ML
20 INJECTION, SOLUTION INTRAVENOUS ONCE
Status: DISCONTINUED | OUTPATIENT
Start: 2024-05-02 | End: 2024-05-04

## 2024-05-02 RX ORDER — MEPERIDINE HYDROCHLORIDE 25 MG/ML
12.5 INJECTION INTRAMUSCULAR; INTRAVENOUS; SUBCUTANEOUS AS NEEDED
Status: DISCONTINUED | OUTPATIENT
Start: 2024-05-02 | End: 2024-05-04

## 2024-05-02 RX ORDER — METOCLOPRAMIDE HYDROCHLORIDE 5 MG/ML
10 INJECTION INTRAMUSCULAR; INTRAVENOUS ONCE
Status: DISCONTINUED | OUTPATIENT
Start: 2024-05-02 | End: 2024-05-04

## 2024-05-02 RX ORDER — ACETAMINOPHEN 500 MG
1000 TABLET ORAL ONCE
Status: DISCONTINUED | OUTPATIENT
Start: 2024-05-02 | End: 2024-05-04

## 2024-05-02 RX ORDER — FAMOTIDINE 20 MG/1
20 TABLET, FILM COATED ORAL ONCE
Status: DISCONTINUED | OUTPATIENT
Start: 2024-05-02 | End: 2024-05-04

## 2024-05-02 RX ADMIN — SODIUM CHLORIDE, SODIUM LACTATE, POTASSIUM CHLORIDE, CALCIUM CHLORIDE: 600; 310; 30; 20 INJECTION, SOLUTION INTRAVENOUS at 14:45:00

## 2024-05-02 RX ADMIN — SODIUM CHLORIDE, SODIUM LACTATE, POTASSIUM CHLORIDE, CALCIUM CHLORIDE: 600; 310; 30; 20 INJECTION, SOLUTION INTRAVENOUS at 15:25:00

## 2024-05-02 RX ADMIN — LIDOCAINE HYDROCHLORIDE 25 MG: 10 INJECTION, SOLUTION EPIDURAL; INFILTRATION; INTRACAUDAL; PERINEURAL at 14:48:00

## 2024-05-02 NOTE — ANESTHESIA PREPROCEDURE EVALUATION
Anesthesia PreOp Note    HPI:     Triny Head is a 58 year old female who presents for preoperative consultation requested by: * No surgeons listed *    Date of Surgery: 2024    * No procedures listed *  Indication: * No pre-op diagnosis entered *    Relevant Problems   No relevant active problems       NPO:  Last Liquid Consumption Date: 24  Last Liquid Consumption Time:   Last Solid Consumption Date: 24  Last Solid Consumption Time:   Last Liquid Consumption Date: 24          History Review:  Patient Active Problem List    Diagnosis Date Noted    Inflammatory polyarthritis (HCC) 2024    Family history of autoimmune disorder 2024    ESR raised 2024    CRP elevated 2024    Chronic kidney disease 10/04/2023    Obesity (BMI 30-39.9) 2023    RUQ pain 2023    Gastroesophageal reflux disease without esophagitis 2023    Hiatal hernia     Tortuous colon     Numbness and tingling in both hands 2020    Numbness and tingling of both legs 2020    Esophageal polyp     Polyp of colon     Gastroesophageal reflux disease with esophagitis 2019    Hypertension 2012       Past Medical History:    Back problem    Bronchitis    Cardiomyopathy (HCC)    \"enlarged heart\" per pt    Fibromyalgia    GERD (gastroesophageal reflux disease)    repeat EGD in  due to esophagitis    High blood pressure    Problems with swallowing    Tubular adenoma of colon    repeat CLN in - sees Dr. Allen at [facility]       Past Surgical History:   Procedure Laterality Date          Colonoscopy N/A 2019    Procedure: COLONOSCOPY;  Surgeon: SARAH Pina MD;  Location: [facility] ENDOSCOPY    Colonoscopy N/A 2020    Procedure: COLONOSCOPY;  Surgeon: SARAH Pina MD;  Location: [facility] ENDOSCOPY    Colonoscopy      Cyst aspiration right  2014    Hysterectomy      38    Desmond localization wire 1 site right (cpt=[id number])      Needle biopsy  right      in her 20's       (Not in a hospital admission)    Current Outpatient Medications Ordered in Epic   Medication Sig Dispense Refill    Potassium Chloride ER 10 MEQ Oral Tab CR Take 1 tablet (10 mEq total) by mouth daily. 30 tablet 0    atenolol 50 MG Oral Tab Take 1 tablet (50 mg total) by mouth daily. 90 tablet 3    losartan 50 MG Oral Tab Take 1 tablet (50 mg total) by mouth daily. 90 tablet 3    hydroCHLOROthiazide 25 MG Oral Tab Take 1 tablet (25 mg total) by mouth daily. 90 tablet 3    KETOCONAZOLE 2 % External Shampoo APPLY TO SCALP 2 TIMES PER WEEK 120 mL 0    ketoconazole 2 % External Cream APPLY  CREAM TOPICALLY TO AFFECTED AREA TWICE DAILY 30 g 0    Aspirin 81 MG Oral Tab Take 1 tablet (81 mg total) by mouth daily.      Phentermine HCl 15 MG Oral Cap Take 1 capsule (15 mg total) by mouth every morning. 30 capsule 3    Multiple Vitamin (MULTI-VITAMIN) Oral Tab Take 1 tablet by mouth daily.       Current Facility-Administered Medications Ordered in Epic   Medication Dose Route Frequency Provider Last Rate Last Admin    lactated ringers infusion   Intravenous Continuous Aldo De La Rosa MD        acetaminophen (Tylenol Extra Strength) tab 1,000 mg  1,000 mg Oral Once Aldo De La Rosa MD        metoprolol tartrate (Lopressor) tab 25 mg  25 mg Oral Once PRN Aldo De La Rosa MD        famotidine (Pepcid) tab 20 mg  20 mg Oral Once Aldo De La Rosa MD        Or    famotidine (Pepcid) 20 mg/2mL injection 20 mg  20 mg Intravenous Once Aldo De La Rosa MD        metoclopramide (Reglan) tab 10 mg  10 mg Oral Once Aldo De La Rosa MD        Or    metoclopramide (Reglan) 5 mg/mL injection 10 mg  10 mg Intravenous Once Aldo De La Rosa MD           Allergies   Allergen Reactions    Latex HIVES    Morphine Sulfate ITCHING       Family History   Problem Relation Age of Onset    Heart Disorder Father     Other (Other) Mother     Breast Cancer Paternal Aunt         in her 50's      Social History     Socioeconomic History    Marital status: Single   Tobacco Use    Smoking status: Former     Types: Cigarettes     Start date: 1979    Smokeless tobacco: Former   Vaping Use    Vaping status: Never Used   Substance and Sexual Activity    Alcohol use: Yes     Comment: occ    Drug use: Never       Available pre-op labs reviewed.  Lab Results   Component Value Date    WBC 6.3 02/21/2024    RBC 5.37 (H) 02/21/2024    HGB 13.1 02/21/2024    HCT 41.5 02/21/2024    MCV 77.3 (L) 02/21/2024    MCH 24.4 (L) 02/21/2024    MCHC 31.6 02/21/2024    RDW 14.5 02/21/2024    .0 02/21/2024     Lab Results   Component Value Date     04/05/2024    K 4.0 04/05/2024     04/05/2024    CO2 30.0 04/05/2024    BUN 21 04/05/2024    CREATSERUM 1.17 (H) 04/05/2024    GLU 96 04/05/2024    CA 10.0 04/05/2024          Vital Signs:  Body mass index is 34.54 kg/m².   height is 1.676 m (5' 6\") and weight is 97.1 kg (214 lb). Her oral temperature is 98 °F (36.7 °C). Her blood pressure is 166/81 (abnormal) and her pulse is 58. Her respiration is 16 and oxygen saturation is 96%.   Vitals:    05/01/24 1118 05/02/24 1300   BP:  (!) 166/81   Pulse:  58   Resp:  16   Temp:  98 °F (36.7 °C)   TempSrc:  Oral   SpO2:  96%   Weight: 95.3 kg (210 lb) 97.1 kg (214 lb)   Height: 1.676 m (5' 6\") 1.676 m (5' 6\")        Anesthesia Evaluation     Patient summary reviewed and Nursing notes reviewed    Airway   Mallampati: II  TM distance: >3 FB  Neck ROM: full  Dental          Pulmonary - normal exam    breath sounds clear to auscultation  Cardiovascular - normal exam  (+) hypertension    Rhythm: regular  Rate: normal    Neuro/Psych    (+)  neuromuscular disease,        GI/Hepatic/Renal    (+) GERD    Endo/Other    Abdominal                  Anesthesia Plan:   ASA:  3  Plan:   General  Airway:  LMA  Informed Consent Plan and Risks Discussed With:  Patient      I have informed Triny Head and/or legal guardian or family  member of the nature of the anesthetic plan, benefits, risks including possible dental damage if relevant, major complications, and any alternative forms of anesthetic management.   All of the patient's questions were answered to the best of my ability. The patient desires the anesthetic management as planned.  KIRIT HESS MD  5/2/2024 2:46 PM  Present on Admission:  **None**

## 2024-05-02 NOTE — DISCHARGE INSTRUCTIONS
HOME INSTRUCTIONS  [facility] HOME CARE INSTRUCTIONS: POST-OP ANESTHESIA  The medication that you received for sedation or general anesthesia can last up to 24 hours. Your judgment and reflexes may be altered, even if you feel like your normal self.      We Recommend:   Do not drive any motor vehicle or bicycle   Avoid mowing the lawn, playing sports, or working with power tools/applicances (power saws, electric knives or mixers)   That you have someone stay with you on your first night home   Do not drink alcohol or take sleeping pills or tranquilizers   Do not sign legal documents within 24 hours of your procedure   If you had a nerve block for your surgery, take extra care not to put any pressure on your arm or hand for 24 hours    It is normal:  For you to have a sore throat if you had a breathing tube during surgery (while you were asleep!). The sore throat should get better within 48 hours. You can gargle with warm salt water (1/2 tsp in 4 oz warm water) or use a throat lozenge for comfort  To feel muscle aches or soreness especially in the abdomen, chest or neck. The achy feeling should go away in the next 24 hours  To feel weak, sleepy or \"wiped out\". Your should start feeling better in the next 24 hours.   To experience mild discomforts such as sore lip or tongue, headache, cramps, gas pains or a bloated feeling in your abdomen.   To experience mild back pain or soreness for a day or two if you had spinal or epidural anesthesia.   If you had laparoscopic surgery, to feel shoulder pain or discomfort on the day of surgery.   For some patients to have nausea after surgery/anesthesia    If you feel nausea or experience vomiting:   Try to move around less.   Eat less than usual or drink only liquids until the next morning   Nausea should resolve in about 24 hours    If you have a problem when you are at home:    Call your surgeons office +      Discharge Instructions: After Your Surgery  You’ve just had surgery.  During surgery, you were given medicine called anesthesia to keep you relaxed and free of pain. After surgery, you may have some pain or nausea. This is common. Here are some tips for feeling better and getting well after surgery.   Going home  Your healthcare provider will show you how to take care of yourself when you go home. They'll also answer your questions. Have an adult family member or friend drive you home. For the first 24 hours after your surgery:   Don't drive or use heavy equipment.  Don't make important decisions or sign legal papers.  Take medicines as directed.  Don't drink alcohol.  Have someone stay with you, if needed. They can watch for problems and help keep you safe.  Be sure to go to all follow-up visits with your healthcare provider. And rest after your surgery for as long as your provider tells you to.   Coping with pain  If you have pain after surgery, pain medicine will help you feel better. Take it as directed, before pain becomes severe. Also, ask your healthcare provider or pharmacist about other ways to control pain. This might be with heat, ice, or relaxation. And follow any other instructions your surgeon or nurse gives you.      Stay on schedule with your medicine.     Tips for taking pain medicine  To get the best relief possible, remember these points:   Pain medicines can upset your stomach. Taking them with a little food may help.  Most pain relievers taken by mouth need at least 20 to 30 minutes to start to work.  Don't wait till your pain becomes severe before you take your medicine. Try to time your medicine so that you can take it before starting an activity. This might be before you get dressed, go for a walk, or sit down for dinner.  Constipation is a common side effect of some pain medicines. Call your healthcare provider before taking any medicines such as laxatives or stool softeners to help ease constipation. Also ask if you should skip any foods. Drinking lots of  fluids and eating foods such as fruits and vegetables that are high in fiber can also help. Remember, don't take laxatives unless your surgeon has prescribed them.  Drinking alcohol and taking pain medicine can cause dizziness and slow your breathing. It can even be deadly. Don't drink alcohol while taking pain medicine.  Pain medicine can make you react more slowly to things. Don't drive or run machinery while taking pain medicine.  Your healthcare provider may tell you to take acetaminophen to help ease your pain. Ask them how much you're supposed to take each day. Acetaminophen or other pain relievers may interact with your prescription medicines or other over-the-counter (OTC) medicines. Some prescription medicines have acetaminophen and other ingredients in them. Using both prescription and OTC acetaminophen for pain can cause you to accidentally overdose. Read the labels on your OTC medicines with care. This will help you to clearly know the list of ingredients, how much to take, and any warnings. It may also help you not take too much acetaminophen. If you have questions or don't understand the information, ask your pharmacist or healthcare provider to explain it to you before you take the OTC medicine.   Managing nausea  Some people have an upset stomach (nausea) after surgery. This is often because of anesthesia, pain, or pain medicine, less movement of food in the stomach, or the stress of surgery. These tips will help you handle nausea and eat healthy foods as you get better. If you were on a special food plan before surgery, ask your healthcare provider if you should follow it while you get better. Check with your provider on how your eating should progress. It may depend on the surgery you had. These general tips may help:   Don't push yourself to eat. Your body will tell you when to eat and how much.  Start off with clear liquids and soup. They're easier to digest.  Next try semi-solid foods as you  feel ready. These include mashed potatoes, applesauce, and gelatin.  Slowly move to solid foods. Don’t eat fatty, rich, or spicy foods at first.  Don't force yourself to have 3 large meals a day. Instead eat smaller amounts more often.  Take pain medicines with a small amount of solid food, such as crackers or toast. This helps prevent nausea.  When to call your healthcare provider  Call your healthcare provider right away if you have any of these:   You still have too much pain, or the pain gets worse, after taking the medicine. The medicine may not be strong enough. Or there may be a complication from the surgery.  You feel too sleepy, dizzy, or groggy. The medicine may be too strong.  Side effects such as nausea or vomiting. Your healthcare provider may advise taking other medicines to .  Skin changes such as rash, itching, or hives. This may mean you have an allergic reaction. Your provider may advise taking other medicines.  The incision looks different (for instance, part of it opens up).  Bleeding or fluid leaking from the incision site, and weren't told to expect that.  Fever of 100.4°F (38°C) or higher, or as directed by your provider.  Call 911  Call 911 right away if you have:   Trouble breathing  Facial swelling    If you have obstructive sleep apnea   You were given anesthesia medicine during surgery to keep you comfortable and free of pain. After surgery, you may have more apnea spells because of this medicine and other medicines you were given. The spells may last longer than normal.    At home:  Keep using the continuous positive airway pressure (CPAP) device when you sleep. Unless your healthcare provider tells you not to, use it when you sleep, day or night. CPAP is a common device used to treat obstructive sleep apnea.  Talk with your provider before taking any pain medicine, muscle relaxants, or sedatives. Your provider will tell you about the possible dangers of taking these medicines.  Contact  your provider if your sleeping changes a lot even when taking medicines as directed.  [employer] last reviewed this educational content on 10/1/2021  © 2776-2518 [employer]. All rights reserved. This information is not intended as a substitute for professional medical care. Always follow your healthcare professional's instructions.

## 2024-05-02 NOTE — ANESTHESIA PROCEDURE NOTES
Airway  Date/Time: 5/2/2024 2:49 PM  Urgency: Elective      General Information and Staff    Patient location during procedure: OR  Anesthesiologist: Jason Lyle MD  Performed: anesthesiologist   Performed by: Jason Lyle MD  Authorized by: Jason Lyle MD      Indications and Patient Condition  Indications for airway management: anesthesia  Sedation level: deep  Preoxygenated: yes  Patient position: sniffing  Mask difficulty assessment: 1 - vent by mask    Final Airway Details  Final airway type: supraglottic airway      Successful airway: classic  Size 4       Number of attempts at approach: 1

## 2024-05-02 NOTE — ANESTHESIA POSTPROCEDURE EVALUATION
Patient: Triny Head    Procedure Summary       Date: 05/02/24 Room / Location: [facility] MRI; [facility] Post Anesthesia Care Unit    Anesthesia Start: 1445 Anesthesia Stop:     Procedure: MRI BRAIN (CPT=70551) Diagnosis: MCI (mild cognitive impairment) with memory loss    Scheduled Providers:  Anesthesiologist: Jason Lyle MD    Anesthesia Type: general ASA Status: 3            Anesthesia Type: general    Vitals Value Taken Time   /89 05/02/24 1531   Temp 97.3 °F (36.3 °C) 05/02/24 1530   Pulse 50 05/02/24 1531   Resp 17 05/02/24 1531   SpO2 94 % 05/02/24 1531   Vitals shown include unfiled device data.    [facility] AN Post Evaluation:   Patient Evaluated in PACU  Patient Participation: complete - patient participated  Level of Consciousness: awake  Pain Score: 0  Pain Management: adequate  Airway Patency:patent  Dental exam unchanged from preop  Yes    Cardiovascular Status: acceptable  Respiratory Status: acceptable  Postoperative Hydration acceptable      MACEY REDDING MD  5/2/2024 3:32 PM

## 2024-05-20 ENCOUNTER — LAB ENCOUNTER (OUTPATIENT)
Dept: LAB | Age: 59
End: 2024-05-20
Attending: INTERNAL MEDICINE

## 2024-05-20 ENCOUNTER — OFFICE VISIT (OUTPATIENT)
Dept: INTERNAL MEDICINE CLINIC | Facility: CLINIC | Age: 59
End: 2024-05-20

## 2024-05-20 VITALS
HEART RATE: 64 BPM | DIASTOLIC BLOOD PRESSURE: 84 MMHG | BODY MASS INDEX: 34.23 KG/M2 | SYSTOLIC BLOOD PRESSURE: 126 MMHG | HEIGHT: 66 IN | WEIGHT: 213 LBS

## 2024-05-20 DIAGNOSIS — I88.9 LYMPHADENITIS: ICD-10-CM

## 2024-05-20 DIAGNOSIS — R73.03 PREDIABETES: ICD-10-CM

## 2024-05-20 DIAGNOSIS — R89.9 ABNORMAL LABORATORY TEST: ICD-10-CM

## 2024-05-20 DIAGNOSIS — I10 PRIMARY HYPERTENSION: ICD-10-CM

## 2024-05-20 DIAGNOSIS — R13.19 OTHER DYSPHAGIA: Primary | ICD-10-CM

## 2024-05-20 DIAGNOSIS — R71.8 MICROCYTOSIS: ICD-10-CM

## 2024-05-20 LAB
ANION GAP SERPL CALC-SCNC: 7 MMOL/L (ref 0–18)
BASOPHILS # BLD AUTO: 0.01 X10(3) UL (ref 0–0.2)
BASOPHILS NFR BLD AUTO: 0.2 %
BUN BLD-MCNC: 22 MG/DL (ref 9–23)
BUN/CREAT SERPL: 18.5 (ref 10–20)
CALCIUM BLD-MCNC: 9.6 MG/DL (ref 8.7–10.4)
CHLORIDE SERPL-SCNC: 106 MMOL/L (ref 98–112)
CO2 SERPL-SCNC: 31 MMOL/L (ref 21–32)
CREAT BLD-MCNC: 1.19 MG/DL
CRP SERPL-MCNC: 2 MG/DL (ref ?–1)
DEPRECATED RDW RBC AUTO: 38.4 FL (ref 35.1–46.3)
EGFRCR SERPLBLD CKD-EPI 2021: 52 ML/MIN/1.73M2 (ref 60–?)
EOSINOPHIL # BLD AUTO: 0 X10(3) UL (ref 0–0.7)
EOSINOPHIL NFR BLD AUTO: 0 %
ERYTHROCYTE [DISTWIDTH] IN BLOOD BY AUTOMATED COUNT: 13.4 % (ref 11–15)
ERYTHROCYTE [SEDIMENTATION RATE] IN BLOOD: 26 MM/HR
EST. AVERAGE GLUCOSE BLD GHB EST-MCNC: 128 MG/DL (ref 68–126)
FASTING STATUS PATIENT QL REPORTED: NO
GLUCOSE BLD-MCNC: 98 MG/DL (ref 70–99)
HBA1C MFR BLD: 6.1 % (ref ?–5.7)
HCT VFR BLD AUTO: 43.4 %
HGB BLD-MCNC: 13.4 G/DL
IMM GRANULOCYTES # BLD AUTO: 0.02 X10(3) UL (ref 0–1)
IMM GRANULOCYTES NFR BLD: 0.3 %
LYMPHOCYTES # BLD AUTO: 2.1 X10(3) UL (ref 1–4)
LYMPHOCYTES NFR BLD AUTO: 31.8 %
MCH RBC QN AUTO: 24.6 PG (ref 26–34)
MCHC RBC AUTO-ENTMCNC: 30.9 G/DL (ref 31–37)
MCV RBC AUTO: 79.8 FL
MONOCYTES # BLD AUTO: 0.61 X10(3) UL (ref 0.1–1)
MONOCYTES NFR BLD AUTO: 9.2 %
NEUTROPHILS # BLD AUTO: 3.87 X10 (3) UL (ref 1.5–7.7)
NEUTROPHILS # BLD AUTO: 3.87 X10(3) UL (ref 1.5–7.7)
NEUTROPHILS NFR BLD AUTO: 58.5 %
OSMOLALITY SERPL CALC.SUM OF ELEC: 301 MOSM/KG (ref 275–295)
PLATELET # BLD AUTO: 254 10(3)UL (ref 150–450)
POTASSIUM SERPL-SCNC: 4 MMOL/L (ref 3.5–5.1)
RBC # BLD AUTO: 5.44 X10(6)UL
SODIUM SERPL-SCNC: 144 MMOL/L (ref 136–145)
WBC # BLD AUTO: 6.6 X10(3) UL (ref 4–11)

## 2024-05-20 PROCEDURE — 36415 COLL VENOUS BLD VENIPUNCTURE: CPT

## 2024-05-20 PROCEDURE — 82728 ASSAY OF FERRITIN: CPT

## 2024-05-20 PROCEDURE — 83540 ASSAY OF IRON: CPT

## 2024-05-20 PROCEDURE — 86140 C-REACTIVE PROTEIN: CPT

## 2024-05-20 PROCEDURE — 84466 ASSAY OF TRANSFERRIN: CPT

## 2024-05-20 PROCEDURE — 85025 COMPLETE CBC W/AUTO DIFF WBC: CPT

## 2024-05-20 PROCEDURE — 80048 BASIC METABOLIC PNL TOTAL CA: CPT

## 2024-05-20 PROCEDURE — 85652 RBC SED RATE AUTOMATED: CPT

## 2024-05-20 PROCEDURE — 83036 HEMOGLOBIN GLYCOSYLATED A1C: CPT

## 2024-05-20 PROCEDURE — 99213 OFFICE O/P EST LOW 20 MIN: CPT | Performed by: INTERNAL MEDICINE

## 2024-05-20 RX ORDER — PANTOPRAZOLE SODIUM 40 MG/1
40 TABLET, DELAYED RELEASE ORAL
Qty: 90 TABLET | Refills: 0 | Status: SHIPPED | OUTPATIENT
Start: 2024-05-20

## 2024-05-20 NOTE — PROGRESS NOTES
Subjective:     Patient ID: Triny Head is a 58 year old female.  For follow-up on several concerns    HPI  Patient reports difficulty swallowing food gets stuck in her throat, she does have more heartburn slightly.    Also discovered lump under her chin which is not painful at times can be sensitive to touch.  She had endoscopy 3 years ago, denies cough or chest pains.  She has history of kidney disease stage III and hypokalemia  Does not take potassium supplement lately last time it was given to her in the February 2022 for 30 tablets hopefully  She has seen RHEUMATOLOGIST , congestion and of inflammatory arthropathy was made because inflammatory markers sed rate and C-reactive protein were elevated, patient was advised to try prednisone but she is afraid to try steroids because mother had bad side effects to the steroids.    Current Outpatient Medications   Medication Sig Dispense Refill    Potassium Chloride ER 10 MEQ Oral Tab CR Take 1 tablet (10 mEq total) by mouth daily. 30 tablet 0    atenolol 50 MG Oral Tab Take 1 tablet (50 mg total) by mouth daily. 90 tablet 3    losartan 50 MG Oral Tab Take 1 tablet (50 mg total) by mouth daily. 90 tablet 3    hydroCHLOROthiazide 25 MG Oral Tab Take 1 tablet (25 mg total) by mouth daily. 90 tablet 3    KETOCONAZOLE 2 % External Shampoo APPLY TO SCALP 2 TIMES PER WEEK 120 mL 0    ketoconazole 2 % External Cream APPLY  CREAM TOPICALLY TO AFFECTED AREA TWICE DAILY 30 g 0    Multiple Vitamin (MULTI-VITAMIN) Oral Tab Take 1 tablet by mouth daily.      Aspirin 81 MG Oral Tab Take 1 tablet (81 mg total) by mouth daily.      Phentermine HCl 15 MG Oral Cap Take 1 capsule (15 mg total) by mouth every morning. (Patient not taking: Reported on 5/20/2024) 30 capsule 3     Allergies:  Allergies   Allergen Reactions    Latex HIVES    Morphine Sulfate ITCHING       Past Medical History:    Back problem    Bronchitis    Cardiomyopathy (HCC)    \"enlarged heart\" per pt    Fibromyalgia     GERD (gastroesophageal reflux disease)    repeat EGD in  due to esophagitis    High blood pressure    Problems with swallowing    Tubular adenoma of colon    repeat CLN in - sees Dr. Allen at [facility]      Past Surgical History:   Procedure Laterality Date          Colonoscopy N/A 2019    Procedure: COLONOSCOPY;  Surgeon: SARAH Pina MD;  Location: [facility] ENDOSCOPY    Colonoscopy N/A 2020    Procedure: COLONOSCOPY;  Surgeon: SARAH Pina MD;  Location: [facility] ENDOSCOPY    Colonoscopy      Cyst aspiration right      Hysterectomy      38    Desmond localization wire 1 site right (cpt=19281)      Needle biopsy right      in her 20's      Family History   Problem Relation Age of Onset    Heart Disorder Father     Other (Other) Mother     Breast Cancer Paternal Aunt         in her 50's      Social History:   Social History     Socioeconomic History    Marital status: Single   Tobacco Use    Smoking status: Former     Types: Cigarettes     Start date:     Smokeless tobacco: Former   Vaping Use    Vaping status: Never Used   Substance and Sexual Activity    Alcohol use: Yes     Comment: occ    Drug use: Never        /84   Pulse 64   Ht 5' 6\" (1.676 m)   Wt 213 lb (96.6 kg)   BMI 34.38 kg/m²    Physical Exam  Constitutional:       Appearance: Normal appearance. She is obese.   HENT:      Head: Normocephalic and atraumatic.   Eyes:      General: No scleral icterus.     Extraocular Movements: Extraocular movements intact.      Conjunctiva/sclera: Conjunctivae normal.      Pupils: Pupils are equal, round, and reactive to light.   Neck:      Vascular: No carotid bruit.     Cardiovascular:      Rate and Rhythm: Normal rate and regular rhythm.      Heart sounds: No murmur heard.     No gallop.   Pulmonary:      Effort: Pulmonary effort is normal.      Breath sounds: No wheezing or rhonchi.   Musculoskeletal:         General: Normal range of motion.      Cervical back: Normal  range of motion.      Right lower leg: No edema.      Left lower leg: No edema.   Lymphadenopathy:      Cervical: No cervical adenopathy.   Skin:     General: Skin is warm.      Coloration: Skin is not jaundiced.   Neurological:      General: No focal deficit present.      Mental Status: She is alert and oriented to person, place, and time. Mental status is at baseline.      Gait: Gait normal.   Psychiatric:         Mood and Affect: Mood normal.         Behavior: Behavior normal.         Thought Content: Thought content normal.       Assessment & Plan:   1. Other dysphagia reviewed with patient importance of GERD diet, continue pantoprazole 40 mg daily, see gastroenterologist for EGD   2. Prediabetes check CBC CMP hemoglobin A1c continue low carbohydrate diet attempts to lose weight encouraged   3. Abnormal laboratory test    4. Lymphadenitis will check LDH see ENT specialist       Orders Placed This Encounter   Procedures    CBC With Differential With Platelet    Basic Metabolic Panel (8) [E]    Hemoglobin A1C [E]    Sed Rate, Westergren (Automated)    C-Reactive Protein       Meds This Visit:  Requested Prescriptions      No prescriptions requested or ordered in this encounter       Imaging & Referrals:  GASTRO - INTERNAL  ENT - INTERNAL     Follow-up in 3 months sooner if needed

## 2024-05-21 LAB
DEPRECATED HBV CORE AB SER IA-ACNC: 278.5 NG/ML
IRON SATN MFR SERPL: 15 %
IRON SERPL-MCNC: 47 UG/DL
TIBC SERPL-MCNC: 323 UG/DL (ref 250–425)
TRANSFERRIN SERPL-MCNC: 217 MG/DL (ref 250–380)

## 2024-05-22 ENCOUNTER — TELEPHONE (OUTPATIENT)
Facility: CLINIC | Age: 59
End: 2024-05-22

## 2024-05-22 NOTE — TELEPHONE ENCOUNTER
Dr. Pina,    I spoke to patient who states she has been having trouble swallowing, is a bit better today. Feels like mucus/acid is stuck in her throat. Having these symptoms for the past few weeks. Occurs throughout the day, denies vomiting, chest pain, cough, shortness of breath.     She has an appt with ENT for a lump under her chin. She restarted pantoprazole today.     Please advise, thank you.

## 2024-05-28 ENCOUNTER — OFFICE VISIT (OUTPATIENT)
Facility: LOCATION | Age: 59
End: 2024-05-28

## 2024-05-28 VITALS — BODY MASS INDEX: 39.01 KG/M2 | WEIGHT: 212 LBS | HEIGHT: 62 IN

## 2024-05-28 DIAGNOSIS — R13.10 ODYNOPHAGIA: ICD-10-CM

## 2024-05-28 DIAGNOSIS — R59.0 CERVICAL LYMPHADENOPATHY: Primary | ICD-10-CM

## 2024-05-28 PROCEDURE — 99203 OFFICE O/P NEW LOW 30 MIN: CPT | Performed by: STUDENT IN AN ORGANIZED HEALTH CARE EDUCATION/TRAINING PROGRAM

## 2024-05-28 NOTE — PROGRESS NOTES
[place]  OTOLARYNGOLOGY - HEAD & NECK SURGERY    2024     Reason for Consultation:   Neck lump    History of Present Illness:   Patient is a pleasant 58 year old female who is being seen for 3 to 4 weeks of a lump that she noticed in her submental area.  The patient states that she has had recent episodes of laryngitis and was having some throat pain with swallowing.  She does also have a history of reflux and has an appointment with a gastroenterologist coming up.  She was then feeling her neck and noticed a lump underneath her chin.  She states it is not painful.  She is unsure if it is growing in size.  She does not have any history of consistent tobacco use, no family history of head neck cancer, she denies any night sweats, fevers, weight loss.  She is here because this finding has caused her to be anxious regarding this.    Past Medical History  Past Medical History:    Back problem    Bronchitis    Cardiomyopathy (HCC)    \"enlarged heart\" per pt    Fibromyalgia    GERD (gastroesophageal reflux disease)    repeat EGD in  due to esophagitis    High blood pressure    Problems with swallowing    Tubular adenoma of colon    repeat CLN in - sees Dr. Allen at [facility]       Past Surgical History  Past Surgical History:   Procedure Laterality Date          Colonoscopy N/A 2019    Procedure: COLONOSCOPY;  Surgeon: SARAH Pina MD;  Location: [facility] ENDOSCOPY    Colonoscopy N/A 2020    Procedure: COLONOSCOPY;  Surgeon: SARAH Pina MD;  Location: [facility] ENDOSCOPY    Colonoscopy      Cyst aspiration right      Hysterectomy      38    Desmond localization wire 1 site right (cpt=19281)      Needle biopsy right      in her 20's       Family History  Family History   Problem Relation Age of Onset    Heart Disorder Father     Other (Other) Mother     Breast Cancer Paternal Aunt         in her 50's       Social History  Pediatric History   Patient Parents    Not on file     Other  Topics Concern    Not on file   Social History Narrative    Not on file           Current Medications:  Current Outpatient Medications   Medication Sig Dispense Refill    pantoprazole 40 MG Oral Tab EC Take 1 tablet (40 mg total) by mouth every morning before breakfast. 90 tablet 0    Phentermine HCl 15 MG Oral Cap Take 1 capsule (15 mg total) by mouth every morning. 30 capsule 3    atenolol 50 MG Oral Tab Take 1 tablet (50 mg total) by mouth daily. 90 tablet 3    losartan 50 MG Oral Tab Take 1 tablet (50 mg total) by mouth daily. 90 tablet 3    hydroCHLOROthiazide 25 MG Oral Tab Take 1 tablet (25 mg total) by mouth daily. 90 tablet 3    KETOCONAZOLE 2 % External Shampoo APPLY TO SCALP 2 TIMES PER WEEK 120 mL 0    ketoconazole 2 % External Cream APPLY  CREAM TOPICALLY TO AFFECTED AREA TWICE DAILY 30 g 0    Multiple Vitamin (MULTI-VITAMIN) Oral Tab Take 1 tablet by mouth daily.      Aspirin 81 MG Oral Tab Take 1 tablet (81 mg total) by mouth daily.      Potassium Chloride ER 10 MEQ Oral Tab CR Take 1 tablet (10 mEq total) by mouth daily. 30 tablet 0       Allergies  Allergies   Allergen Reactions    Latex HIVES    Morphine Sulfate ITCHING       Review of Systems:   A comprehensive 10 point review of systems was completed.  Pertinent positives and negatives noted in the the HPI.    Physical Exam:   Height 5' 2\" (1.575 m), weight 212 lb (96.2 kg).    GENERAL: No acute distress, Comfortable appearing  FACE: HB 1/6, Normal Animation  HEAD: Normocephalic  EYES: EOMI, pupils equil  EARS: Bilateral Auricles Symmetric, bilateral tympanic membranes normal  NOSE: Nares patent bilaterally  ORAL CAVITY: Tongue mobile, Oropharynx clear, Floor of mouth clear, Posterior oropharynx normal  NECK: There is a single submental lymph node which is soft, freely mobile, nontender, and approximately 1.5 cm in size., thyroid not palpable, nontender    Results:     Laboratory Data:  Lab Results   Component Value Date    WBC 6.6 05/20/2024     HGB 13.4 05/20/2024    HCT 43.4 05/20/2024    .0 05/20/2024    CREATSERUM 1.19 (H) 05/20/2024    BUN 22 05/20/2024     05/20/2024    K 4.0 05/20/2024     05/20/2024    CO2 31.0 05/20/2024    GLU 98 05/20/2024    CA 9.6 05/20/2024    ALB 4.2 02/21/2024    ALKPHO 112 02/21/2024    TP 7.1 02/21/2024    AST 23 02/21/2024    ALT 21 02/21/2024    T4F 1.2 08/09/2023    TSH 1.516 04/01/2024    LIP 64 (H) 01/17/2019    ESRML 26 05/20/2024    CRP 2.00 (H) 05/20/2024    MG 2.2 03/03/2023    TROP <0.045 05/07/2021     08/15/2022    B12 1,575 (H) 04/01/2024         Imaging:  MRI BRAIN (CPT=70551)    Result Date: 5/3/2024  PROCEDURE: MRI BRAIN (CPT=70551)  COMPARISON: None.  INDICATIONS: G31.84 MCI (mild cognitive impairment) with memory loss  TECHNIQUE: A variety of imaging planes and parameters were utilized for visualization of suspected pathology.  Images were performed without contrast.  FINDINGS:  CEREBRUM: Scattered T2/FLAIR signal hyperintense foci within the superficial and deep cerebral white matter without mass effect or diffusion restriction.  There is otherwise no intra-axial mass, mass effect, or hemorrhage.  No abnormal diffusion restriction to suggest acute or subacute infarct. CEREBELLUM: No edema, hemorrhage, mass, acute infarction, or inappropriate atrophy.  BRAINSTEM: No edema, hemorrhage, mass, acute infarction, or inappropriate atrophy.  CSF SPACES: Ventricles, cisterns, and sulci are appropriate for age.  No hydrocephalus, subarachnoid hemorrhage, or mass.  SKULL: No mass or other significant visible lesion.  SINUSES: Limited views demonstrate no significant mucosal thickening or fluid.  ORBITS: Limited views are unremarkable.  OTHER: Negative.          CONCLUSION: No acute intracranial process.  Scattered T2/FLAIR signal changes within the cerebral white matter which may represent sequela of prior inflammation, prior infection, or microvascular white matter ischemic change related  to longstanding hypertension and/or diabetes.    Dictated by (CST): Andrews Daily MD on 5/03/2024 at 11:46 AM     Finalized by (CST): Andrews Daily MD on 5/03/2024 at 11:47 AM              Impression:   Cervical lymphadenopathy  Odynophagia  GERD    Recommendations:  I will have her return to see me in 6 to 8 weeks for another examination.  If it grows or changes in character I may obtain ultrasound or CT neck with contrast.  I discussed with the patient that there are no concerning characteristics on exam and that this is most likely benign.    Thank you for allowing me to participate in the care of your patient.    Jose Wetzel, DO   Otolaryngology/Rhinology, Sinus, and Endoscopic Skull Base Surgery  [address] [place] [address]  Phone [phone]  Fax [phone]  5/28/2024  9:32 AM  5/28/2024

## 2024-05-28 NOTE — TELEPHONE ENCOUNTER
Spoke to patient and confirmed appt. Location/date/time details provided.       Your appointments       Date & Time Appointment Department (Center)    Jun 06, 2024 10:00 AM CDT Follow Up Visit with SARAH Pina MD [facility], [facility] ([facility])

## 2024-06-15 ENCOUNTER — HOSPITAL ENCOUNTER (OUTPATIENT)
Dept: MAMMOGRAPHY | Facility: HOSPITAL | Age: 59
Discharge: HOME | End: 2024-06-15
Attending: INTERNAL MEDICINE
Payer: MEDICAID

## 2024-06-15 DIAGNOSIS — Z12.31 SCREENING MAMMOGRAM FOR BREAST CANCER: ICD-10-CM

## 2024-06-21 ENCOUNTER — TELEPHONE (OUTPATIENT)
Dept: INTERNAL MEDICINE CLINIC | Facility: CLINIC | Age: 59
End: 2024-06-21

## 2024-06-21 ENCOUNTER — TELEPHONE (OUTPATIENT)
Dept: CASE MANAGEMENT | Age: 59
End: 2024-06-21

## 2024-06-21 NOTE — TELEPHONE ENCOUNTER
Dr. Danielle,     Patient called requesting a diagnositic mammogram order.     Radiologist at [facility] told patient that current order in system is not diagnosic.    Thank you

## 2024-06-21 NOTE — TELEPHONE ENCOUNTER
Spoke with patient verified . Informed patient Dr Danielle switch Desmond order to diagnosic.   Patient will be calling Monday to schedule an appt.

## 2024-07-09 ENCOUNTER — OFFICE VISIT (OUTPATIENT)
Facility: LOCATION | Age: 59
End: 2024-07-09

## 2024-07-09 ENCOUNTER — TELEPHONE (OUTPATIENT)
Dept: OTOLARYNGOLOGY | Facility: CLINIC | Age: 59
End: 2024-07-09

## 2024-07-09 VITALS — BODY MASS INDEX: 39.2 KG/M2 | WEIGHT: 213 LBS | HEIGHT: 62 IN

## 2024-07-09 DIAGNOSIS — R13.10 ODYNOPHAGIA: ICD-10-CM

## 2024-07-09 DIAGNOSIS — Z01.818 PRE-OP TESTING: ICD-10-CM

## 2024-07-09 DIAGNOSIS — R59.0 CERVICAL LYMPHADENOPATHY: Primary | ICD-10-CM

## 2024-07-09 DIAGNOSIS — Z01.818 PREOP TESTING: ICD-10-CM

## 2024-07-09 PROCEDURE — 99213 OFFICE O/P EST LOW 20 MIN: CPT | Performed by: STUDENT IN AN ORGANIZED HEALTH CARE EDUCATION/TRAINING PROGRAM

## 2024-07-09 NOTE — TELEPHONE ENCOUNTER
Patient scheduled for Excisional biopsy of neck mass on 7/19/24 at [facility].     Reminded patient to complete labs and EKG prior.

## 2024-07-09 NOTE — PROGRESS NOTES
Patient scheduled for Excisional biopsy of neck mass on 7/19/24 at [facility].

## 2024-07-09 NOTE — PROGRESS NOTES
[place]  OTOLARYNGOLOGY - HEAD & NECK SURGERY    2024     Reason for Consultation:   Neck lump    History of Present Illness:   Patient is a pleasant 58 year old female who is being seen for 3 to 4 weeks of a lump that she noticed in her submental area.  The patient states that she has had recent episodes of laryngitis and was having some throat pain with swallowing.  She does also have a history of reflux and has an appointment with a gastroenterologist coming up.  She was then feeling her neck and noticed a lump underneath her chin.  She states it is not painful.  She is unsure if it is growing in size.  She does not have any history of consistent tobacco use, no family history of head neck cancer, she denies any night sweats, fevers, weight loss.  She is here because this finding has caused her to be anxious regarding this.    INTERVAL HISTORY  2024: The patient returns today with continuation of her submental lymph node.  She is unsure if it has gotten any larger but she says it is noticeable.  It does cause her discomfort when she moves around.  She has not had any fevers or night sweats.  No unintended weight loss.  She is hoping to have this removed and sent for pathology.    Past Medical History  Past Medical History:    Back problem    Bronchitis    Cardiomyopathy (HCC)    \"enlarged heart\" per pt    Fibromyalgia    GERD (gastroesophageal reflux disease)    repeat EGD in  due to esophagitis    High blood pressure    Problems with swallowing    Tubular adenoma of colon    repeat CLN in - sees Dr. Allen at [facility]       Past Surgical History  Past Surgical History:   Procedure Laterality Date          Colonoscopy N/A 2019    Procedure: COLONOSCOPY;  Surgeon: SARAH Pina MD;  Location: [facility] ENDOSCOPY    Colonoscopy N/A 2020    Procedure: COLONOSCOPY;  Surgeon: SARAH Pina MD;  Location: [facility] ENDOSCOPY    Colonoscopy      Cyst aspiration right       Hysterectomy      38    Desmond localization wire 1 site right (cpt=19281)  2005    Needle biopsy right      in her 20's       Family History  Family History   Problem Relation Age of Onset    Heart Disorder Father     Other (Other) Mother     Breast Cancer Paternal Aunt         in her 50's       Social History  Pediatric History   Patient Parents    Not on file     Other Topics Concern    Not on file   Social History Narrative    Not on file           Current Medications:  Current Outpatient Medications   Medication Sig Dispense Refill    pantoprazole 40 MG Oral Tab EC Take 1 tablet (40 mg total) by mouth every morning before breakfast. 90 tablet 0    Phentermine HCl 15 MG Oral Cap Take 1 capsule (15 mg total) by mouth every morning. 30 capsule 3    atenolol 50 MG Oral Tab Take 1 tablet (50 mg total) by mouth daily. 90 tablet 3    losartan 50 MG Oral Tab Take 1 tablet (50 mg total) by mouth daily. 90 tablet 3    hydroCHLOROthiazide 25 MG Oral Tab Take 1 tablet (25 mg total) by mouth daily. 90 tablet 3    KETOCONAZOLE 2 % External Shampoo APPLY TO SCALP 2 TIMES PER WEEK 120 mL 0    ketoconazole 2 % External Cream APPLY  CREAM TOPICALLY TO AFFECTED AREA TWICE DAILY 30 g 0    Multiple Vitamin (MULTI-VITAMIN) Oral Tab Take 1 tablet by mouth daily.      Aspirin 81 MG Oral Tab Take 1 tablet (81 mg total) by mouth daily.         Allergies  Allergies   Allergen Reactions    Latex HIVES    Morphine Sulfate ITCHING       Review of Systems:   A comprehensive 10 point review of systems was completed.  Pertinent positives and negatives noted in the the HPI.    Physical Exam:   There were no vitals taken for this visit.    GENERAL: No acute distress, Comfortable appearing  FACE: HB 1/6, Normal Animation  HEAD: Normocephalic  EYES: EOMI, pupils equil  EARS: Bilateral Auricles Symmetric, bilateral tympanic membranes normal  NOSE: Nares patent bilaterally  ORAL CAVITY: Tongue mobile, Oropharynx clear, Floor of mouth clear, Posterior  oropharynx normal  NECK: There is a single submental lymph node which is soft, freely mobile, nontender, and approximately 1.5 cm in size, largely unchanged from previous exam, thyroid not palpable, nontender    Results:     Laboratory Data:  Lab Results   Component Value Date    WBC 6.6 05/20/2024    HGB 13.4 05/20/2024    HCT 43.4 05/20/2024    .0 05/20/2024    CREATSERUM 1.19 (H) 05/20/2024    BUN 22 05/20/2024     05/20/2024    K 4.0 05/20/2024     05/20/2024    CO2 31.0 05/20/2024    GLU 98 05/20/2024    CA 9.6 05/20/2024    ALB 4.2 02/21/2024    ALKPHO 112 02/21/2024    TP 7.1 02/21/2024    AST 23 02/21/2024    ALT 21 02/21/2024    T4F 1.2 08/09/2023    TSH 1.516 04/01/2024    LIP 64 (H) 01/17/2019    ESRML 26 05/20/2024    CRP 2.00 (H) 05/20/2024    MG 2.2 03/03/2023    TROP <0.045 05/07/2021     08/15/2022    B12 1,575 (H) 04/01/2024         Imaging:  MRI BRAIN (CPT=70551)    Result Date: 5/3/2024  PROCEDURE: MRI BRAIN (CPT=70551)  COMPARISON: None.  INDICATIONS: G31.84 MCI (mild cognitive impairment) with memory loss  TECHNIQUE: A variety of imaging planes and parameters were utilized for visualization of suspected pathology.  Images were performed without contrast.  FINDINGS:  CEREBRUM: Scattered T2/FLAIR signal hyperintense foci within the superficial and deep cerebral white matter without mass effect or diffusion restriction.  There is otherwise no intra-axial mass, mass effect, or hemorrhage.  No abnormal diffusion restriction to suggest acute or subacute infarct. CEREBELLUM: No edema, hemorrhage, mass, acute infarction, or inappropriate atrophy.  BRAINSTEM: No edema, hemorrhage, mass, acute infarction, or inappropriate atrophy.  CSF SPACES: Ventricles, cisterns, and sulci are appropriate for age.  No hydrocephalus, subarachnoid hemorrhage, or mass.  SKULL: No mass or other significant visible lesion.  SINUSES: Limited views demonstrate no significant mucosal thickening or  fluid.  ORBITS: Limited views are unremarkable.  OTHER: Negative.          CONCLUSION: No acute intracranial process.  Scattered T2/FLAIR signal changes within the cerebral white matter which may represent sequela of prior inflammation, prior infection, or microvascular white matter ischemic change related to longstanding hypertension and/or diabetes.    Dictated by (CST): Andrews Daily MD on 5/03/2024 at 11:46 AM     Finalized by (CST): Andrews Daily MD on 5/03/2024 at 11:47 AM              Impression:   Cervical lymphadenopathy  Odynophagia  GERD    Recommendations:  I would like her to obtain an ultrasound of the lymph node in the submental region.  We would also plan for excisional biopsy under general anesthesia in the main OR.  She would like to get this done sooner rather than later, in hopes to have this done towards the end of this month.    Thank you for allowing me to participate in the care of your patient.    Jose Wetzel, DO   Otolaryngology/Rhinology, Sinus, and Endoscopic Skull Base Surgery  [facility]   [address] [place] [address] [address] [place] [address]  Phone [phone]  Fax [phone]  5/28/2024  9:32 AM  7/9/2024

## 2024-07-10 ENCOUNTER — HOSPITAL ENCOUNTER (OUTPATIENT)
Dept: MAMMOGRAPHY | Facility: HOSPITAL | Age: 59
Discharge: HOME | End: 2024-07-10
Attending: INTERNAL MEDICINE
Payer: MEDICAID

## 2024-07-10 ENCOUNTER — HOSPITAL ENCOUNTER (OUTPATIENT)
Dept: ULTRASOUND IMAGING | Facility: HOSPITAL | Age: 59
Discharge: HOME | End: 2024-07-10
Attending: INTERNAL MEDICINE
Payer: MEDICAID

## 2024-07-10 DIAGNOSIS — N63.0 MASS OF BREAST, UNSPECIFIED LATERALITY: ICD-10-CM

## 2024-07-10 PROCEDURE — 76642 ULTRASOUND BREAST LIMITED: CPT | Performed by: INTERNAL MEDICINE

## 2024-07-10 PROCEDURE — 77062 BREAST TOMOSYNTHESIS BI: CPT | Performed by: INTERNAL MEDICINE

## 2024-07-10 PROCEDURE — 77066 DX MAMMO INCL CAD BI: CPT | Performed by: INTERNAL MEDICINE

## 2024-07-11 ENCOUNTER — LAB ENCOUNTER (OUTPATIENT)
Dept: LAB | Facility: HOSPITAL | Age: 59
End: 2024-07-11
Attending: STUDENT IN AN ORGANIZED HEALTH CARE EDUCATION/TRAINING PROGRAM
Payer: MEDICAID

## 2024-07-11 ENCOUNTER — OFFICE VISIT (OUTPATIENT)
Dept: NEUROLOGY | Facility: CLINIC | Age: 59
End: 2024-07-11
Payer: MEDICAID

## 2024-07-11 VITALS — HEIGHT: 62 IN | BODY MASS INDEX: 38.64 KG/M2 | WEIGHT: 210 LBS

## 2024-07-11 DIAGNOSIS — R20.0 NUMBNESS AND TINGLING OF BOTH LEGS: ICD-10-CM

## 2024-07-11 DIAGNOSIS — Z01.818 PREOP TESTING: ICD-10-CM

## 2024-07-11 DIAGNOSIS — Z01.818 PRE-OP TESTING: ICD-10-CM

## 2024-07-11 DIAGNOSIS — R25.2 LEG CRAMPS: Primary | ICD-10-CM

## 2024-07-11 DIAGNOSIS — G56.01 CARPAL TUNNEL SYNDROME OF RIGHT WRIST: ICD-10-CM

## 2024-07-11 DIAGNOSIS — R20.2 NUMBNESS AND TINGLING OF BOTH LEGS: ICD-10-CM

## 2024-07-11 LAB
ALBUMIN SERPL-MCNC: 4.6 G/DL (ref 3.2–4.8)
ALBUMIN/GLOB SERPL: 1.5 {RATIO} (ref 1–2)
ALP LIVER SERPL-CCNC: 118 U/L
ALT SERPL-CCNC: 12 U/L
ANION GAP SERPL CALC-SCNC: 8 MMOL/L (ref 0–18)
APTT PPP: 24 SECONDS (ref 23–36)
AST SERPL-CCNC: 18 U/L (ref ?–34)
ATRIAL RATE: 64 BPM
BASOPHILS # BLD AUTO: 0.02 X10(3) UL (ref 0–0.2)
BASOPHILS NFR BLD AUTO: 0.3 %
BILIRUB SERPL-MCNC: 0.4 MG/DL (ref 0.2–1.1)
BUN BLD-MCNC: 20 MG/DL (ref 9–23)
BUN/CREAT SERPL: 17.9 (ref 10–20)
CALCIUM BLD-MCNC: 9.4 MG/DL (ref 8.7–10.4)
CHLORIDE SERPL-SCNC: 107 MMOL/L (ref 98–112)
CO2 SERPL-SCNC: 27 MMOL/L (ref 21–32)
CREAT BLD-MCNC: 1.12 MG/DL
DEPRECATED RDW RBC AUTO: 38.4 FL (ref 35.1–46.3)
EGFRCR SERPLBLD CKD-EPI 2021: 56 ML/MIN/1.73M2 (ref 60–?)
EOSINOPHIL # BLD AUTO: 0.13 X10(3) UL (ref 0–0.7)
EOSINOPHIL NFR BLD AUTO: 2.1 %
ERYTHROCYTE [DISTWIDTH] IN BLOOD BY AUTOMATED COUNT: 13.9 % (ref 11–15)
FASTING STATUS PATIENT QL REPORTED: YES
GLOBULIN PLAS-MCNC: 3 G/DL (ref 2–3.5)
GLUCOSE BLD-MCNC: 110 MG/DL (ref 70–99)
HCT VFR BLD AUTO: 42.4 %
HGB BLD-MCNC: 13.9 G/DL
IMM GRANULOCYTES # BLD AUTO: 0.02 X10(3) UL (ref 0–1)
IMM GRANULOCYTES NFR BLD: 0.3 %
INR BLD: 0.96 (ref 0.8–1.2)
LYMPHOCYTES # BLD AUTO: 1.88 X10(3) UL (ref 1–4)
LYMPHOCYTES NFR BLD AUTO: 30.7 %
MCH RBC QN AUTO: 25.2 PG (ref 26–34)
MCHC RBC AUTO-ENTMCNC: 32.8 G/DL (ref 31–37)
MCV RBC AUTO: 77 FL
MONOCYTES # BLD AUTO: 0.56 X10(3) UL (ref 0.1–1)
MONOCYTES NFR BLD AUTO: 9.2 %
NEUTROPHILS # BLD AUTO: 3.51 X10 (3) UL (ref 1.5–7.7)
NEUTROPHILS # BLD AUTO: 3.51 X10(3) UL (ref 1.5–7.7)
NEUTROPHILS NFR BLD AUTO: 57.4 %
OSMOLALITY SERPL CALC.SUM OF ELEC: 297 MOSM/KG (ref 275–295)
P AXIS: 60 DEGREES
P-R INTERVAL: 160 MS
PLATELET # BLD AUTO: 250 10(3)UL (ref 150–450)
POTASSIUM SERPL-SCNC: 3.7 MMOL/L (ref 3.5–5.1)
PROT SERPL-MCNC: 7.6 G/DL (ref 5.7–8.2)
PROTHROMBIN TIME: 13.4 SECONDS (ref 11.6–14.8)
Q-T INTERVAL: 392 MS
QRS DURATION: 78 MS
QTC CALCULATION (BEZET): 404 MS
R AXIS: 9 DEGREES
RBC # BLD AUTO: 5.51 X10(6)UL
SODIUM SERPL-SCNC: 142 MMOL/L (ref 136–145)
T AXIS: 32 DEGREES
VENTRICULAR RATE: 64 BPM
WBC # BLD AUTO: 6.1 X10(3) UL (ref 4–11)

## 2024-07-11 PROCEDURE — 36415 COLL VENOUS BLD VENIPUNCTURE: CPT

## 2024-07-11 PROCEDURE — 80053 COMPREHEN METABOLIC PANEL: CPT

## 2024-07-11 PROCEDURE — 93010 ELECTROCARDIOGRAM REPORT: CPT | Performed by: STUDENT IN AN ORGANIZED HEALTH CARE EDUCATION/TRAINING PROGRAM

## 2024-07-11 PROCEDURE — 93005 ELECTROCARDIOGRAM TRACING: CPT

## 2024-07-11 PROCEDURE — 99213 OFFICE O/P EST LOW 20 MIN: CPT | Performed by: OTHER

## 2024-07-11 PROCEDURE — 85730 THROMBOPLASTIN TIME PARTIAL: CPT

## 2024-07-11 PROCEDURE — 85610 PROTHROMBIN TIME: CPT

## 2024-07-11 PROCEDURE — 85060 BLOOD SMEAR INTERPRETATION: CPT

## 2024-07-11 PROCEDURE — 85025 COMPLETE CBC W/AUTO DIFF WBC: CPT

## 2024-07-11 NOTE — PROGRESS NOTES
Neurology follow-up visit     Referred By: Dr. Frazier ref. provider found    Chief Complaint:   Chief Complaint   Patient presents with    Neurologic Problem     LOV 04/01/2024 pt presents with leg cramps /Paresthesias and memory. Pt states she has completed her MRI and would like to review the results today.       HPI:     Triny Head is a 58 year old female, who presents for rambling and paresthesias.  Apparently patient has had history of carpal tunnel syndrome, she even had a surgery back some years ago on the left side.  Back pain since at least 2020 or so patient started develop this feeling of cramping in her legs especially, some cramping in her hands as well.  Especially when she is at rest however she is trying to go to sleep.  She says needs to get out of the car to walk around.  At night she frequently gets up to walk around the coffee table in the bedroom.  Sometimes cramping makes her feel as if she is spreading her toes or fingers.  CPK in June was normal but then it was elevated in September.  History of fibromyalgia, hypertension.  Patient also has been developing some feeling of tingling and numbness in the fingertips of both hands, occasionally dropping things from her hands.     EMG showed evidence of mild to moderate carpal tunnel on the right side.  No significant neuropathy.    Patient was lost to follow-up for 3 years and came back.  Her hand weakness was getting worse.  She continued to have cramps in the legs.  But she did not do gabapentin anymore.  Also she was concerned that she was forgetful.  MRI of the brain was done in May 2024, still showed some nonspecific white matter changes.    B12, TSH, folic acid, RPR was checked and that was not revealing.  Neuropsychological testing also was ordered.  That was not done yet by the time she came for follow-up in July 2024.  She felt overall better in terms of her cognition.    Past Medical History:    Back problem    Bronchitis     Cardiomyopathy (HCC)    \"enlarged heart\" per pt    Fibromyalgia    GERD (gastroesophageal reflux disease)    repeat EGD in  due to esophagitis    High blood pressure    Problems with swallowing    Tubular adenoma of colon    repeat CLN in - sees Dr. Allen at [facility]       Past Surgical History:   Procedure Laterality Date          Colonoscopy N/A 2019    Procedure: COLONOSCOPY;  Surgeon: SARAH Pina MD;  Location: [facility] ENDOSCOPY    Colonoscopy N/A 2020    Procedure: COLONOSCOPY;  Surgeon: SARAH Pina MD;  Location: [facility] ENDOSCOPY    Colonoscopy      Cyst aspiration right      Hysterectomy      38    Desmond localization wire 1 site right (cpt=19281)      Needle biopsy right      in her 20's       Social history:  History   Smoking Status    Former    Types: Cigarettes   Smokeless Tobacco    Former     History   Alcohol Use    Yes     Comment: occ     History   Drug Use Unknown       Family History   Problem Relation Age of Onset    Other (Other) Mother     Heart Disorder Father     Prostate Cancer Maternal Grandfather 77    Breast Cancer Paternal Aunt         in her 50's         Current Outpatient Medications:     pantoprazole 40 MG Oral Tab EC, Take 1 tablet (40 mg total) by mouth every morning before breakfast., Disp: 90 tablet, Rfl: 0    Phentermine HCl 15 MG Oral Cap, Take 1 capsule (15 mg total) by mouth every morning., Disp: 30 capsule, Rfl: 3    atenolol 50 MG Oral Tab, Take 1 tablet (50 mg total) by mouth daily., Disp: 90 tablet, Rfl: 3    losartan 50 MG Oral Tab, Take 1 tablet (50 mg total) by mouth daily., Disp: 90 tablet, Rfl: 3    hydroCHLOROthiazide 25 MG Oral Tab, Take 1 tablet (25 mg total) by mouth daily., Disp: 90 tablet, Rfl: 3    KETOCONAZOLE 2 % External Shampoo, APPLY TO SCALP 2 TIMES PER WEEK, Disp: 120 mL, Rfl: 0    ketoconazole 2 % External Cream, APPLY  CREAM TOPICALLY TO AFFECTED AREA TWICE DAILY, Disp: 30 g, Rfl: 0    Multiple Vitamin  (MULTI-VITAMIN) Oral Tab, Take 1 tablet by mouth daily., Disp: , Rfl:     Aspirin 81 MG Oral Tab, Take 1 tablet (81 mg total) by mouth daily., Disp: , Rfl:     Allergies   Allergen Reactions    Latex HIVES    Morphine Sulfate ITCHING       ROS:   As in HPI, the rest of the 14 system review was done and was negative      Physical Exam:  Vitals:    07/11/24 1008   Weight: 210 lb (95.3 kg)   Height: 62\"       General: No apparent distress, well nourished, well groomed.  Head- Normocephalic, atraumatic  Eyes- No redness or swelling  ENT- Hearing intake, normal glutition  Neck- No masses or adenopathy  Cv: pulses were palpable and normal, no cyanosis or edema     Neurological:     Mental Status- Alert MoCA was done and was 21/30 and showed deficiencies in multiple domains.      Cranial Nerves:    VII. Face symmetric, no facial weakness  VIII. Hearing intact to whisper.  IX. Pallet elevates symmetrically.  XI. Shoulder shrug is intact  XII. Tongue is midline    Motor Exam:  Muscle tone normal  No atrophy or fasciculations  Strength- upper extremities 5/5 proximally and distally                   Rapid alternating movements intact    Gait:  Normal posture  Normal physiologic      Labs:    Lab Results   Component Value Date    TSH 1.516 04/01/2024     Lab Results   Component Value Date    HDL 76 (H) 08/09/2023     (H) 08/09/2023    TRIG 47 08/09/2023     Lab Results   Component Value Date    HGB 13.9 07/11/2024    HCT 42.4 07/11/2024    MCV 77.0 (L) 07/11/2024    WBC 6.1 07/11/2024    .0 07/11/2024      Lab Results   Component Value Date    BUN 20 07/11/2024    CA 9.4 07/11/2024    ALT 12 07/11/2024    AST 18 07/11/2024    ALB 4.6 07/11/2024     07/11/2024    K 3.7 07/11/2024     07/11/2024    CO2 27.0 07/11/2024      I have reviewed labs.  MRI of the brain was independent reviewed, no obvious significant abnormalities except some minimal white matter changes that would unlikely to explain her  cognition problems    Assessment   1. Paresthesias  Possibility of some return of carpal tunnel syndrome.  EMG was ordered.  In the meantime patient also will be referred to a hand surgeon    2. Cramps, extremity  Strong possibility of possible restless leg syndrome, but some dystonic features is being described as well, extensive workup was not revealing.  Patient decided not to do any symptomatic management at this point.    3.  Memory loss    MRI of the brain so far was not revealing, some minimal white matter changes noted only.  Emphasized importance of treatment of hypertension and diabetes.  Neuropsychological testing is still pending.  Blood work was not revealing.           Education and counseling provided to patient. Instructed patient to call my office or seek medical attention immediately if symptoms worsen.  Patient verbalized understanding of information given. All questions were answered. All side effects of drugs were discussed.     Return to clinic in: No follow-ups on file.    Aldo De La Rosa MD

## 2024-07-18 NOTE — DISCHARGE INSTRUCTIONS
DISCHARGE INSTRUCTIONS  Please leave dressing on for as long as it will stay. If it comes off early, it is okay.  You may shower normally with this on, do not scrub the area  Do not lift anything heavier than 30 lbs for 7 days  Take pain medication as needed  Please take the prescribed antibiotics  Follow up in 7-10 days for suture removal in office    HOME INSTRUCTIONS  AMBSURG HOME CARE INSTRUCTIONS: POST-OP ANESTHESIA  The medication that you received for sedation or general anesthesia can last up to 24 hours. Your judgment and reflexes may be altered, even if you feel like your normal self.      We Recommend:   Do not drive any motor vehicle or bicycle   Avoid mowing the lawn, playing sports, or working with power tools/applicances (power saws, electric knives or mixers)   That you have someone stay with you on your first night home   Do not drink alcohol or take sleeping pills or tranquilizers   Do not sign legal documents within 24 hours of your procedure   If you had a nerve block for your surgery, take extra care not to put any pressure on your arm or hand for 24 hours    It is normal:  For you to have a sore throat if you had a breathing tube during surgery (while you were asleep!). The sore throat should get better within 48 hours. You can gargle with warm salt water (1/2 tsp in 4 oz warm water) or use a throat lozenge for comfort  To feel muscle aches or soreness especially in the abdomen, chest or neck. The achy feeling should go away in the next 24 hours  To feel weak, sleepy or \"wiped out\". Your should start feeling better in the next 24 hours.   To experience mild discomforts such as sore lip or tongue, headache, cramps, gas pains or a bloated feeling in your abdomen.   To experience mild back pain or soreness for a day or two if you had spinal or epidural anesthesia.   If you had laparoscopic surgery, to feel shoulder pain or discomfort on the day of surgery.   For some patients to have nausea after  surgery/anesthesia    If you feel nausea or experience vomiting:   Try to move around less.   Eat less than usual or drink only liquids until the next morning   Nausea should resolve in about 24 hours    If you have a problem when you are at home:    Call your surgeons office   Discharge Instructions: After Your Surgery  You’ve just had surgery. During surgery, you were given medicine called anesthesia to keep you relaxed and free of pain. After surgery, you may have some pain or nausea. This is common. Here are some tips for feeling better and getting well after surgery.   Going home  Your healthcare provider will show you how to take care of yourself when you go home. They'll also answer your questions. Have an adult family member or friend drive you home. For the first 24 hours after your surgery:   Don't drive or use heavy equipment.  Don't make important decisions or sign legal papers.  Take medicines as directed.  Don't drink alcohol.  Have someone stay with you, if needed. They can watch for problems and help keep you safe.  Be sure to go to all follow-up visits with your healthcare provider. And rest after your surgery for as long as your provider tells you to.   Coping with pain  If you have pain after surgery, pain medicine will help you feel better. Take it as directed, before pain becomes severe. Also, ask your healthcare provider or pharmacist about other ways to control pain. This might be with heat, ice, or relaxation. And follow any other instructions your surgeon or nurse gives you.      Stay on schedule with your medicine.     Tips for taking pain medicine  To get the best relief possible, remember these points:   Pain medicines can upset your stomach. Taking them with a little food may help.  Most pain relievers taken by mouth need at least 20 to 30 minutes to start to work.  Don't wait till your pain becomes severe before you take your medicine. Try to time your medicine so that you can take it  before starting an activity. This might be before you get dressed, go for a walk, or sit down for dinner.  Constipation is a common side effect of some pain medicines. Call your healthcare provider before taking any medicines such as laxatives or stool softeners to help ease constipation. Also ask if you should skip any foods. Drinking lots of fluids and eating foods such as fruits and vegetables that are high in fiber can also help. Remember, don't take laxatives unless your surgeon has prescribed them.  Drinking alcohol and taking pain medicine can cause dizziness and slow your breathing. It can even be deadly. Don't drink alcohol while taking pain medicine.  Pain medicine can make you react more slowly to things. Don't drive or run machinery while taking pain medicine.  Your healthcare provider may tell you to take acetaminophen to help ease your pain. Ask them how much you're supposed to take each day. Acetaminophen or other pain relievers may interact with your prescription medicines or other over-the-counter (OTC) medicines. Some prescription medicines have acetaminophen and other ingredients in them. Using both prescription and OTC acetaminophen for pain can cause you to accidentally overdose. Read the labels on your OTC medicines with care. This will help you to clearly know the list of ingredients, how much to take, and any warnings. It may also help you not take too much acetaminophen. If you have questions or don't understand the information, ask your pharmacist or healthcare provider to explain it to you before you take the OTC medicine.   Managing nausea  Some people have an upset stomach (nausea) after surgery. This is often because of anesthesia, pain, or pain medicine, less movement of food in the stomach, or the stress of surgery. These tips will help you handle nausea and eat healthy foods as you get better. If you were on a special food plan before surgery, ask your healthcare provider if you  should follow it while you get better. Check with your provider on how your eating should progress. It may depend on the surgery you had. These general tips may help:   Don't push yourself to eat. Your body will tell you when to eat and how much.  Start off with clear liquids and soup. They're easier to digest.  Next try semi-solid foods as you feel ready. These include mashed potatoes, applesauce, and gelatin.  Slowly move to solid foods. Don’t eat fatty, rich, or spicy foods at first.  Don't force yourself to have 3 large meals a day. Instead eat smaller amounts more often.  Take pain medicines with a small amount of solid food, such as crackers or toast. This helps prevent nausea.  When to call your healthcare provider  Call your healthcare provider right away if you have any of these:   You still have too much pain, or the pain gets worse, after taking the medicine. The medicine may not be strong enough. Or there may be a complication from the surgery.  You feel too sleepy, dizzy, or groggy. The medicine may be too strong.  Side effects such as nausea or vomiting. Your healthcare provider may advise taking other medicines to .  Skin changes such as rash, itching, or hives. This may mean you have an allergic reaction. Your provider may advise taking other medicines.  The incision looks different (for instance, part of it opens up).  Bleeding or fluid leaking from the incision site, and weren't told to expect that.  Fever of 100.4°F (38°C) or higher, or as directed by your provider.  Call 911  Call 911 right away if you have:   Trouble breathing  Facial swelling    If you have obstructive sleep apnea   You were given anesthesia medicine during surgery to keep you comfortable and free of pain. After surgery, you may have more apnea spells because of this medicine and other medicines you were given. The spells may last longer than normal.    At home:  Keep using the continuous positive airway pressure (CPAP) device  when you sleep. Unless your healthcare provider tells you not to, use it when you sleep, day or night. CPAP is a common device used to treat obstructive sleep apnea.  Talk with your provider before taking any pain medicine, muscle relaxants, or sedatives. Your provider will tell you about the possible dangers of taking these medicines.  Contact your provider if your sleeping changes a lot even when taking medicines as directed.  [employer] last reviewed this educational content on 10/1/2021  © 8979-1895 [employer]. All rights reserved. This information is not intended as a substitute for professional medical care. Always follow your healthcare professional's instructions.

## 2024-07-19 ENCOUNTER — HOSPITAL ENCOUNTER (OUTPATIENT)
Facility: HOSPITAL | Age: 59
Setting detail: HOSPITAL OUTPATIENT SURGERY
Discharge: HOME | End: 2024-07-19
Attending: STUDENT IN AN ORGANIZED HEALTH CARE EDUCATION/TRAINING PROGRAM | Admitting: STUDENT IN AN ORGANIZED HEALTH CARE EDUCATION/TRAINING PROGRAM
Payer: MEDICAID

## 2024-07-19 ENCOUNTER — ANESTHESIA EVENT (OUTPATIENT)
Dept: SURGERY | Facility: HOSPITAL | Age: 59
End: 2024-07-19
Payer: MEDICAID

## 2024-07-19 ENCOUNTER — ANESTHESIA (OUTPATIENT)
Dept: SURGERY | Facility: HOSPITAL | Age: 59
End: 2024-07-19
Payer: MEDICAID

## 2024-07-19 VITALS
HEIGHT: 62 IN | WEIGHT: 212 LBS | DIASTOLIC BLOOD PRESSURE: 86 MMHG | HEART RATE: 67 BPM | RESPIRATION RATE: 16 BRPM | OXYGEN SATURATION: 97 % | SYSTOLIC BLOOD PRESSURE: 139 MMHG | BODY MASS INDEX: 39.01 KG/M2 | TEMPERATURE: 98 F

## 2024-07-19 DIAGNOSIS — R59.0 CERVICAL LYMPHADENOPATHY: ICD-10-CM

## 2024-07-19 DIAGNOSIS — R22.1 NECK MASS: Primary | ICD-10-CM

## 2024-07-19 DIAGNOSIS — R13.10 ODYNOPHAGIA: ICD-10-CM

## 2024-07-19 PROCEDURE — 21555 EXC NECK LES SC < 3 CM: CPT | Performed by: STUDENT IN AN ORGANIZED HEALTH CARE EDUCATION/TRAINING PROGRAM

## 2024-07-19 PROCEDURE — 07B10ZX EXCISION OF RIGHT NECK LYMPHATIC, OPEN APPROACH, DIAGNOSTIC: ICD-10-PCS | Performed by: STUDENT IN AN ORGANIZED HEALTH CARE EDUCATION/TRAINING PROGRAM

## 2024-07-19 RX ORDER — ACETAMINOPHEN AND CODEINE PHOSPHATE 300; 30 MG/1; MG/1
2 TABLET ORAL EVERY 6 HOURS PRN
Qty: 30 TABLET | Refills: 0 | Status: SHIPPED | OUTPATIENT
Start: 2024-07-19

## 2024-07-19 RX ORDER — HYDROMORPHONE HYDROCHLORIDE 1 MG/ML
0.2 INJECTION, SOLUTION INTRAMUSCULAR; INTRAVENOUS; SUBCUTANEOUS EVERY 5 MIN PRN
Status: DISCONTINUED | OUTPATIENT
Start: 2024-07-19 | End: 2024-07-19

## 2024-07-19 RX ORDER — METOCLOPRAMIDE 10 MG/1
10 TABLET ORAL ONCE
Status: COMPLETED | OUTPATIENT
Start: 2024-07-19 | End: 2024-07-19

## 2024-07-19 RX ORDER — MORPHINE SULFATE 2 MG/ML
2 INJECTION, SOLUTION INTRAMUSCULAR; INTRAVENOUS EVERY 10 MIN PRN
OUTPATIENT
Start: 2024-07-19

## 2024-07-19 RX ORDER — HYDROMORPHONE HYDROCHLORIDE 1 MG/ML
0.4 INJECTION, SOLUTION INTRAMUSCULAR; INTRAVENOUS; SUBCUTANEOUS EVERY 5 MIN PRN
Status: DISCONTINUED | OUTPATIENT
Start: 2024-07-19 | End: 2024-07-19

## 2024-07-19 RX ORDER — LIDOCAINE HYDROCHLORIDE AND EPINEPHRINE 10; 10 MG/ML; UG/ML
INJECTION, SOLUTION INFILTRATION; PERINEURAL AS NEEDED
Status: DISCONTINUED | OUTPATIENT
Start: 2024-07-19 | End: 2024-07-19 | Stop reason: HOSPADM

## 2024-07-19 RX ORDER — FAMOTIDINE 20 MG/1
20 TABLET, FILM COATED ORAL ONCE
Status: DISCONTINUED | OUTPATIENT
Start: 2024-07-19 | End: 2024-07-19 | Stop reason: HOSPADM

## 2024-07-19 RX ORDER — ACETAMINOPHEN 500 MG
1000 TABLET ORAL ONCE
Status: COMPLETED | OUTPATIENT
Start: 2024-07-19 | End: 2024-07-19

## 2024-07-19 RX ORDER — NALOXONE HYDROCHLORIDE 0.4 MG/ML
0.08 INJECTION, SOLUTION INTRAMUSCULAR; INTRAVENOUS; SUBCUTANEOUS AS NEEDED
Status: DISCONTINUED | OUTPATIENT
Start: 2024-07-19 | End: 2024-07-19

## 2024-07-19 RX ORDER — CEPHALEXIN 500 MG/1
500 CAPSULE ORAL 2 TIMES DAILY
Qty: 14 CAPSULE | Refills: 0 | Status: SHIPPED | OUTPATIENT
Start: 2024-07-19

## 2024-07-19 RX ORDER — SODIUM CHLORIDE, SODIUM LACTATE, POTASSIUM CHLORIDE, CALCIUM CHLORIDE 600; 310; 30; 20 MG/100ML; MG/100ML; MG/100ML; MG/100ML
INJECTION, SOLUTION INTRAVENOUS CONTINUOUS
Status: DISCONTINUED | OUTPATIENT
Start: 2024-07-19 | End: 2024-07-19

## 2024-07-19 RX ORDER — MORPHINE SULFATE 10 MG/ML
6 INJECTION, SOLUTION INTRAMUSCULAR; INTRAVENOUS EVERY 10 MIN PRN
OUTPATIENT
Start: 2024-07-19

## 2024-07-19 RX ORDER — MORPHINE SULFATE 4 MG/ML
4 INJECTION, SOLUTION INTRAMUSCULAR; INTRAVENOUS EVERY 10 MIN PRN
OUTPATIENT
Start: 2024-07-19

## 2024-07-19 RX ORDER — HYDROMORPHONE HYDROCHLORIDE 1 MG/ML
0.6 INJECTION, SOLUTION INTRAMUSCULAR; INTRAVENOUS; SUBCUTANEOUS EVERY 5 MIN PRN
Status: DISCONTINUED | OUTPATIENT
Start: 2024-07-19 | End: 2024-07-19

## 2024-07-19 RX ORDER — METOCLOPRAMIDE HYDROCHLORIDE 5 MG/ML
10 INJECTION INTRAMUSCULAR; INTRAVENOUS ONCE
Status: COMPLETED | OUTPATIENT
Start: 2024-07-19 | End: 2024-07-19

## 2024-07-19 RX ORDER — METOPROLOL TARTRATE 25 MG/1
25 TABLET, FILM COATED ORAL ONCE AS NEEDED
Status: DISCONTINUED | OUTPATIENT
Start: 2024-07-19 | End: 2024-07-19 | Stop reason: HOSPADM

## 2024-07-19 RX ORDER — DEXAMETHASONE SODIUM PHOSPHATE 4 MG/ML
VIAL (ML) INJECTION AS NEEDED
Status: DISCONTINUED | OUTPATIENT
Start: 2024-07-19 | End: 2024-07-19 | Stop reason: SURG

## 2024-07-19 RX ORDER — ONDANSETRON 2 MG/ML
INJECTION INTRAMUSCULAR; INTRAVENOUS AS NEEDED
Status: DISCONTINUED | OUTPATIENT
Start: 2024-07-19 | End: 2024-07-19 | Stop reason: SURG

## 2024-07-19 RX ORDER — GLYCOPYRROLATE 0.2 MG/ML
INJECTION, SOLUTION INTRAMUSCULAR; INTRAVENOUS AS NEEDED
Status: DISCONTINUED | OUTPATIENT
Start: 2024-07-19 | End: 2024-07-19 | Stop reason: SURG

## 2024-07-19 RX ORDER — FAMOTIDINE 10 MG/ML
20 INJECTION, SOLUTION INTRAVENOUS ONCE
Status: DISCONTINUED | OUTPATIENT
Start: 2024-07-19 | End: 2024-07-19 | Stop reason: HOSPADM

## 2024-07-19 RX ADMIN — DEXAMETHASONE SODIUM PHOSPHATE 4 MG: 4 MG/ML VIAL (ML) INJECTION at 14:58:00

## 2024-07-19 RX ADMIN — SODIUM CHLORIDE, SODIUM LACTATE, POTASSIUM CHLORIDE, CALCIUM CHLORIDE: 600; 310; 30; 20 INJECTION, SOLUTION INTRAVENOUS at 14:50:00

## 2024-07-19 RX ADMIN — GLYCOPYRROLATE 0.2 MG: 0.2 INJECTION, SOLUTION INTRAMUSCULAR; INTRAVENOUS at 14:58:00

## 2024-07-19 RX ADMIN — SODIUM CHLORIDE, SODIUM LACTATE, POTASSIUM CHLORIDE, CALCIUM CHLORIDE: 600; 310; 30; 20 INJECTION, SOLUTION INTRAVENOUS at 15:49:00

## 2024-07-19 RX ADMIN — ONDANSETRON 4 MG: 2 INJECTION INTRAMUSCULAR; INTRAVENOUS at 14:58:00

## 2024-07-19 NOTE — ANESTHESIA POSTPROCEDURE EVALUATION
Patient: Triny Head    Procedure Summary       Date: 07/19/24 Room / Location: [facility] MAIN OR 02 / [facility] MAIN OR    Anesthesia Start: 1448 Anesthesia Stop:     Procedure: Excisional biopsy of neck mass (Neck) Diagnosis:       Cervical lymphadenopathy      Odynophagia      (Cervical lymphadenopathy [R59.0]Odynophagia [R13.10])    Surgeons: Jose Wetzel DO Anesthesiologist: Doris Valadez MD    Anesthesia Type: general ASA Status: 3            Anesthesia Type: general    Vitals Value Taken Time   /86 07/19/24 1548   Temp 97.4 °F (36.3 °C) 07/19/24 1548   Pulse 74 07/19/24 1550   Resp 16 07/19/24 1550   SpO2 95 % 07/19/24 1550   Vitals shown include unfiled device data.    [facility] AN Post Evaluation:   Patient Evaluated in PACU  Patient Participation: complete - patient participated  Level of Consciousness: awake  Pain Management: adequate  Airway Patency:patent  Dental exam unchanged from preop  Yes    Cardiovascular Status: acceptable  Respiratory Status: acceptable  Postoperative Hydration acceptable      DORIS VALADEZ MD  7/19/2024 3:51 PM

## 2024-07-19 NOTE — H&P
[facility]  OTOLARYNGOLOGY - HEAD & NECK SURGERY    2024     Reason for Consultation:   Neck lump    History of Present Illness:   Patient is a pleasant 58 year old female who is being seen for 3 to 4 weeks of a lump that she noticed in her submental area.  The patient states that she has had recent episodes of laryngitis and was having some throat pain with swallowing.  She does also have a history of reflux and has an appointment with a gastroenterologist coming up.  She was then feeling her neck and noticed a lump underneath her chin.  She states it is not painful.  She is unsure if it is growing in size.  She does not have any history of consistent tobacco use, no family history of head neck cancer, she denies any night sweats, fevers, weight loss.  She is here because this finding has caused her to be anxious regarding this.    INTERVAL HISTORY  2024: The patient returns today with continuation of her submental lymph node.  She is unsure if it has gotten any larger but she says it is noticeable.  It does cause her discomfort when she moves around.  She has not had any fevers or night sweats.  No unintended weight loss.  She is hoping to have this removed and sent for pathology.  2024: Patient is here today for excisional biopsy of neck mass in the submental area.    Past Medical History  Past Medical History:    Back problem    Bronchitis    Cardiomyopathy (HCC)    \"enlarged heart\" per pt    Fibromyalgia    GERD (gastroesophageal reflux disease)    repeat EGD in  due to esophagitis    High blood pressure    Problems with swallowing    Tubular adenoma of colon    repeat CLN in - sees Dr. Allen at [facility]       Past Surgical History  Past Surgical History:   Procedure Laterality Date          Colonoscopy N/A 2019    Procedure: COLONOSCOPY;  Surgeon: SARAH Pina MD;  Location: [facility] ENDOSCOPY    Colonoscopy N/A 2020    Procedure: COLONOSCOPY;  Surgeon: SARAH Pina  MD Zaina;  Location: [facility] ENDOSCOPY    Colonoscopy      Cyst aspiration right  2014    Hysterectomy      38    Desmond localization wire 1 site right (cpt=19281)  2005    Needle biopsy right      in her 20's       Family History  Family History   Problem Relation Age of Onset    Other (Other) Mother     Heart Disorder Father     Prostate Cancer Maternal Grandfather 77    Breast Cancer Paternal Aunt         in her 50's       Social History  Pediatric History   Patient Parents    Not on file     Other Topics Concern    Not on file   Social History Narrative    Not on file           Current Medications:  No current outpatient medications on file.       Allergies  Allergies   Allergen Reactions    Latex HIVES    Morphine Sulfate ITCHING       Review of Systems:   A comprehensive 10 point review of systems was completed.  Pertinent positives and negatives noted in the the HPI.    Physical Exam:   Blood pressure (!) 176/84, pulse 61, temperature 98.1 °F (36.7 °C), temperature source Oral, resp. rate 16, height 5' 2\" (1.575 m), weight 212 lb (96.2 kg), SpO2 94%.    GENERAL: No acute distress, Comfortable appearing  FACE: HB 1/6, Normal Animation  HEAD: Normocephalic  EYES: EOMI, pupils equil  EARS: Bilateral Auricles Symmetric, bilateral tympanic membranes normal  NOSE: Nares patent bilaterally  ORAL CAVITY: Tongue mobile, Oropharynx clear, Floor of mouth clear, Posterior oropharynx normal  NECK: There is a single submental lymph node which is soft, freely mobile, nontender, and approximately 1.5 cm in size, largely unchanged from previous exam, thyroid not palpable, nontender    Results:     Laboratory Data:  Lab Results   Component Value Date    WBC 6.1 07/11/2024    HGB 13.9 07/11/2024    HCT 42.4 07/11/2024    .0 07/11/2024    CREATSERUM 1.12 (H) 07/11/2024    BUN 20 07/11/2024     07/11/2024    K 3.7 07/11/2024     07/11/2024    CO2 27.0 07/11/2024     (H) 07/11/2024    CA 9.4 07/11/2024    ALB  4.6 07/11/2024    ALKPHO 118 07/11/2024    TP 7.6 07/11/2024    AST 18 07/11/2024    ALT 12 07/11/2024    PTT 24.0 07/11/2024    INR 0.96 07/11/2024    PTP 13.4 07/11/2024    T4F 1.2 08/09/2023    TSH 1.516 04/01/2024    LIP 64 (H) 01/17/2019    ESRML 26 05/20/2024    CRP 2.00 (H) 05/20/2024    MG 2.2 03/03/2023    TROP <0.045 05/07/2021     08/15/2022    B12 1,575 (H) 04/01/2024         Imaging:  MRI BRAIN (CPT=70551)    Result Date: 5/3/2024  PROCEDURE: MRI BRAIN (CPT=70551)  COMPARISON: None.  INDICATIONS: G31.84 MCI (mild cognitive impairment) with memory loss  TECHNIQUE: A variety of imaging planes and parameters were utilized for visualization of suspected pathology.  Images were performed without contrast.  FINDINGS:  CEREBRUM: Scattered T2/FLAIR signal hyperintense foci within the superficial and deep cerebral white matter without mass effect or diffusion restriction.  There is otherwise no intra-axial mass, mass effect, or hemorrhage.  No abnormal diffusion restriction to suggest acute or subacute infarct. CEREBELLUM: No edema, hemorrhage, mass, acute infarction, or inappropriate atrophy.  BRAINSTEM: No edema, hemorrhage, mass, acute infarction, or inappropriate atrophy.  CSF SPACES: Ventricles, cisterns, and sulci are appropriate for age.  No hydrocephalus, subarachnoid hemorrhage, or mass.  SKULL: No mass or other significant visible lesion.  SINUSES: Limited views demonstrate no significant mucosal thickening or fluid.  ORBITS: Limited views are unremarkable.  OTHER: Negative.          CONCLUSION: No acute intracranial process.  Scattered T2/FLAIR signal changes within the cerebral white matter which may represent sequela of prior inflammation, prior infection, or microvascular white matter ischemic change related to longstanding hypertension and/or diabetes.    Dictated by (CST): Andrews Daily MD on 5/03/2024 at 11:46 AM     Finalized by (CST): Andrews Daily MD on 5/03/2024 at 11:47 AM               Impression:   Cervical lymphadenopathy  Odynophagia  GERD    Recommendations:  We will plan for excisional biopsy under general anesthesia in the main OR.  She would like to get this done sooner rather than later, in hopes to have this done towards the end of this month.    Pre-op Diagnosis: Cervical lymphadenopathy [R59.0]  Odynophagia [R13.10]    The above referenced H&P was reviewed by Jose Wetzel DO on 7/19/2024, the patient was examined and no significant changes have occurred in the patient's condition since the H&P was performed.  I discussed with the patient and/or legal representative the potential benefits, risks and side effects of this procedure; the likelihood of the patient achieving goals; and potential problems that might occur during recuperation.  I discussed reasonable alternatives to the procedure, including risks, benefits and side effects related to the alternatives and risks related to not receiving this procedure.  We will proceed with procedure as planned.      Thank you for allowing me to participate in the care of your patient.    Jose Wetzel DO   Otolaryngology/Rhinology, Sinus, and Endoscopic Skull Base Surgery  [address] [place] [id number]  Phone [phone]  Fax [phone]  5/28/2024  9:32 AM  7/9/2024

## 2024-07-19 NOTE — OPERATIVE REPORT
OTOLARYNGOLOGY/HEAD & NECK SURGERY  OPERATIVE REPORT    Patient Name: Triny Head     Date of Surgery: 7/19/2024      Attending Surgeon: Jose Wetzel DO     Anesthesia type: General with LMA     PREOPERATIVE DIAGNOSIS:   (1) Submental neck mass    POSTOPERATIVE DIAGNOSIS:   (1) Submental neck mass    OPERATION:   (1) Excisional biopsy of neck mass    OPERATIVE FINDINGS:   (1) Submental neck conglomerate of lymph nodes in level IA    DESCRIPTION OF PROCEDURE:     The patient was brought to the operating room after all risks and benefits of the procedure were explained and consent was obtained. The patient was positively identified by the attending surgeon and was brought into the Operating Room and placed in the supine position. After induction of general anesthesia, the patient had LMA placed. All pressure points were carefully padded including gel rolls around both elbows, padding underneath the heels, and a foam donut underneath the head.  The eyes were protected. Intravenous antibiotics were administered. The patient was draped in the typical fashion for neck surgery.  A time-out was then performed.    A submental incision was marked in a skin crease and injected using 1% Lidocaine with 1:100,000 epinephrine. Adequate time was allowed for vasoconstriction. Using a 15 blade scalpel incision was made through the skin and dermal layer. Dissection was then carried down into the midline raphe between the bellies of the anterior digastric muscles bilaterally. A lymph node conglomerate then became evident and this was dissected out using bipolar cautery. The pedicle was cauterized as well and the packet removed in its entirety. This was sent fresh for permanent pathologic analysis. The surgical bed was then irrigated vigorously. The incision was closed using 4-0 vicryl suture placed in the subcutaneous layer as wall as in the deep dermal layer. The skin was then closed using 5-0 prolene suture placed in the  running subcuticular fashion. Steri strips and Telfa were placed as well as a Tegaderm. The patient tolerated the surgery without any complications    The patient was then handed back to the Anesthesia Team and was awakened and extubated having tolerated the procedure well, and then transferred to the PACU in stable condition.     Specimens: Submental mass     Complications: None     Estimated Blood Loss: 5 mL     Disposition: PACU     Condition: Stable

## 2024-07-19 NOTE — ANESTHESIA PROCEDURE NOTES
Airway  Date/Time: 7/19/2024 2:59 PM  Airway not difficult    General Information and Staff    Patient location during procedure: OR  Anesthesiologist: Kvng Sotelo MD  Performed: anesthesiologist   Performed by: Kvng Sotelo MD  Authorized by: Kvng Sotelo MD      Indications and Patient Condition  Indications for airway management: anesthesia  Spontaneous Ventilation: absent  Sedation level: deep  Preoxygenated: yes    Planned trial extubation    Final Airway Details  Final airway type: supraglottic airway      Successful airway: classic  Size 4       Number of attempts at approach: 1  Number of other approaches attempted: 0

## 2024-07-19 NOTE — ANESTHESIA PREPROCEDURE EVALUATION
Anesthesia PreOp Note    HPI:     Triny Head is a 58 year old female who presents for preoperative consultation requested by: Jose Wetzel DO    Date of Surgery: 2024    Procedure(s):  Excisional biopsy of neck mass  Indication: Cervical lymphadenopathy [R59.0]  Odynophagia [R13.10]    Relevant Problems   No relevant active problems       NPO:  Last Liquid Consumption Date: 24  Last Liquid Consumption Time: 0800 (WATER WITH MEDS)  Last Solid Consumption Date: 24  Last Solid Consumption Time: 2300  Last Liquid Consumption Date: 24          History Review:  Patient Active Problem List    Diagnosis Date Noted    Inflammatory polyarthritis (HCC) 2024    Family history of autoimmune disorder 2024    ESR raised 2024    CRP elevated 2024    Chronic kidney disease 10/04/2023    Obesity (BMI 30-39.9) 2023    RUQ pain 2023    Gastroesophageal reflux disease without esophagitis 2023    Hiatal hernia     Tortuous colon     Numbness and tingling in both hands 2020    Numbness and tingling of both legs 2020    Esophageal polyp     Polyp of colon     Gastroesophageal reflux disease with esophagitis 2019    Hypertension 2012       Past Medical History:    Back problem    Bronchitis    Cardiomyopathy (HCC)    \"enlarged heart\" per pt    Fibromyalgia    GERD (gastroesophageal reflux disease)    repeat EGD in  due to esophagitis    High blood pressure    Problems with swallowing    Tubular adenoma of colon    repeat CLN in - sees Dr. Allen at [facility]       Past Surgical History:   Procedure Laterality Date          Colonoscopy N/A 2019    Procedure: COLONOSCOPY;  Surgeon: SARAH Pina MD;  Location: [facility] ENDOSCOPY    Colonoscopy N/A 2020    Procedure: COLONOSCOPY;  Surgeon: SARAH Pina MD;  Location: [facility] ENDOSCOPY    Colonoscopy      Cyst aspiration right  2014    Hysterectomy      38    Desmond  localization wire 1 site right (cpt=19281)  2005    Needle biopsy right      in her 20's       Medications Prior to Admission   Medication Sig Dispense Refill Last Dose    pantoprazole 40 MG Oral Tab EC Take 1 tablet (40 mg total) by mouth every morning before breakfast. 90 tablet 0 7/19/2024    Phentermine HCl 15 MG Oral Cap Take 1 capsule (15 mg total) by mouth every morning. 30 capsule 3 Past Month    atenolol 50 MG Oral Tab Take 1 tablet (50 mg total) by mouth daily. 90 tablet 3 7/19/2024    losartan 50 MG Oral Tab Take 1 tablet (50 mg total) by mouth daily. 90 tablet 3 7/18/2024    hydroCHLOROthiazide 25 MG Oral Tab Take 1 tablet (25 mg total) by mouth daily. 90 tablet 3 7/18/2024    KETOCONAZOLE 2 % External Shampoo APPLY TO SCALP 2 TIMES PER WEEK 120 mL 0 Past Week    ketoconazole 2 % External Cream APPLY  CREAM TOPICALLY TO AFFECTED AREA TWICE DAILY 30 g 0 Past Week    Multiple Vitamin (MULTI-VITAMIN) Oral Tab Take 1 tablet by mouth daily.   Past Week    Aspirin 81 MG Oral Tab Take 1 tablet (81 mg total) by mouth daily.   7/12/2024     Current Facility-Administered Medications Ordered in Epic   Medication Dose Route Frequency Provider Last Rate Last Admin    lactated ringers infusion   Intravenous Continuous Jose Wetzel DO 20 mL/hr at 07/19/24 1219 New Bag at 07/19/24 1219    metoprolol tartrate (Lopressor) tab 25 mg  25 mg Oral Once PRN Jose Wetzel DO        famotidine (Pepcid) tab 20 mg  20 mg Oral Once Jose Wetzel DO        Or    famotidine (Pepcid) 20 mg/2mL injection 20 mg  20 mg Intravenous Once Jose Wetzel DO         No current [facility]-ordered outpatient medications on file.       Allergies   Allergen Reactions    Latex HIVES    Morphine Sulfate ITCHING       Family History   Problem Relation Age of Onset    Other (Other) Mother     Heart Disorder Father     Prostate Cancer Maternal Grandfather 77    Breast Cancer Paternal Aunt         in her 50's     Social History      Socioeconomic History    Marital status: Single   Tobacco Use    Smoking status: Former     Types: Cigarettes     Start date: 1979    Smokeless tobacco: Former   Vaping Use    Vaping status: Never Used   Substance and Sexual Activity    Alcohol use: Yes     Comment: occ    Drug use: Never       Available pre-op labs reviewed.  Lab Results   Component Value Date    WBC 6.1 07/11/2024    RBC 5.51 (H) 07/11/2024    HGB 13.9 07/11/2024    HCT 42.4 07/11/2024    MCV 77.0 (L) 07/11/2024    MCH 25.2 (L) 07/11/2024    MCHC 32.8 07/11/2024    RDW 13.9 07/11/2024    .0 07/11/2024     Lab Results   Component Value Date     07/11/2024    K 3.7 07/11/2024     07/11/2024    CO2 27.0 07/11/2024    BUN 20 07/11/2024    CREATSERUM 1.12 (H) 07/11/2024     (H) 07/11/2024    CA 9.4 07/11/2024     Lab Results   Component Value Date    INR 0.96 07/11/2024       Vital Signs:  Body mass index is 38.78 kg/m².   height is 1.575 m (5' 2\") and weight is 96.2 kg (212 lb). Her oral temperature is 98.1 °F (36.7 °C). Her blood pressure is 176/84 (abnormal) and her pulse is 61. Her respiration is 16 and oxygen saturation is 94%.   Vitals:    07/10/24 1316 07/19/24 1202   BP:  (!) 176/84   Pulse:  61   Resp:  16   Temp:  98.1 °F (36.7 °C)   TempSrc:  Oral   SpO2:  94%   Weight: 95.3 kg (210 lb) 96.2 kg (212 lb)   Height: 1.575 m (5' 2\")         Anesthesia Evaluation     Patient summary reviewed and Nursing notes reviewed    Airway   Mallampati: II  TM distance: >3 FB  Neck ROM: full  Dental - Dentition appears grossly intact     Pulmonary - normal exam   Cardiovascular - normal exam  (+) hypertension    Neuro/Psych    (+)  neuromuscular disease,        GI/Hepatic/Renal    (+) GERD    Endo/Other      Comments: Morbid obesity  Abdominal                  Anesthesia Plan:   ASA:  3  Plan:   General  Airway:  LMA  Post-op Pain Management: IV analgesics  Informed Consent Plan and Risks Discussed With:  Patient  Discussed plan  with:  Surgeon      I have informed Modestosera GIA Adilene and/or legal guardian or family member of the nature of the anesthetic plan, benefits, risks including possible dental damage if relevant, major complications, and any alternative forms of anesthetic management.   All of the patient's questions were answered to the best of my ability. The patient desires the anesthetic management as planned.  DORIS VALADEZ MD  7/19/2024 12:55 PM  Present on Admission:  **None**

## 2024-07-20 NOTE — ED INITIAL ASSESSMENT (HPI)
Pt reports left sided chest pain and radiates to left of back. Pt reports nausea. Pt denies sob, headache, or dizziness. Pt reports generalized fatigue. yes

## 2024-07-22 ENCOUNTER — TELEPHONE (OUTPATIENT)
Dept: OTOLARYNGOLOGY | Facility: CLINIC | Age: 59
End: 2024-07-22

## 2024-07-22 LAB
CD10 CELLS NFR SPEC: <1 %
CD10/CD19: <1 %
CD19 CELLS NFR SPEC: 16 %
CD19+/CD200+: 7 %
CD2 CELLS NFR SPEC: 88 %
CD20 CELLS NFR SPEC: 16 %
CD200 CELLS: 21 %
CD3 CELLS NFR SPEC: 87 %
CD3+/TCRGD+: <1 %
CD3+CD4+ CELLS NFR SPEC: 71 %
CD3+CD4+ CELLS/CD3+CD8+ CLL SPEC: 4.4
CD3+CD8+ CELLS NFR SPEC: 16 %
CD3-/CD56+: 1 %
CD34 CELLS NFR SPEC: <1 %
CD38 CELLS NFR SPEC: 1 %
CD38+/CD19+: <1 %
CD45 CELLS NFR SPEC: 100 %
CD5 CELLS NFR SPEC: 82 %
CD5/CD19 CELLS: <1 %
CD7 CELLS NFR SPEC: 79 %
CELL SURF KAPPA/LAMBDA RATIO: 2.2
CELL SURF LAMBDA LIGHT CHAIN: 5 %
CELL SURFACE KAPPA LIGHT CHAIN: 11 %
TCR G-D CELLS NFR SPEC: <1 %

## 2024-07-22 RX ORDER — PANTOPRAZOLE SODIUM 40 MG/1
40 TABLET, DELAYED RELEASE ORAL
Qty: 90 TABLET | Refills: 0 | OUTPATIENT
Start: 2024-07-22

## 2024-07-22 NOTE — TELEPHONE ENCOUNTER
Need a refill on       pantoprazole 40 MG Oral Tab EC, Take 1 tablet (40 mg total) by mouth every morning before breakfast., Disp: 90 tablet, Rfl: 0

## 2024-07-22 NOTE — TELEPHONE ENCOUNTER
Pt is post op day 1 excision of neck mass. Per pt doing well, dressing intact. Reviewed instructions below.     Please leave dressing on for as long as it will stay. If it comes off early, it is okay.  You may shower normally with this on, do not scrub the area  Do not lift anything heavier than 30 lbs for 7 days  Take pain medication as needed  Please take the prescribed antibiotics  Follow up in 7-10 days for suture removal in office

## 2024-07-29 ENCOUNTER — OFFICE VISIT (OUTPATIENT)
Dept: OTOLARYNGOLOGY | Facility: CLINIC | Age: 59
End: 2024-07-29
Payer: MEDICAID

## 2024-07-29 VITALS — WEIGHT: 212 LBS | HEIGHT: 62 IN | BODY MASS INDEX: 39.01 KG/M2

## 2024-07-29 DIAGNOSIS — R59.0 CERVICAL LYMPHADENOPATHY: Primary | ICD-10-CM

## 2024-07-29 PROCEDURE — 99024 POSTOP FOLLOW-UP VISIT: CPT | Performed by: STUDENT IN AN ORGANIZED HEALTH CARE EDUCATION/TRAINING PROGRAM

## 2024-07-29 NOTE — PROGRESS NOTES
[place]  OTOLARYNGOLOGY - HEAD & NECK SURGERY    2024     Reason for Consultation:   Neck lump    History of Present Illness:   Patient is a pleasant 58 year old female who is being seen for 3 to 4 weeks of a lump that she noticed in her submental area.  The patient states that she has had recent episodes of laryngitis and was having some throat pain with swallowing.  She does also have a history of reflux and has an appointment with a gastroenterologist coming up.  She was then feeling her neck and noticed a lump underneath her chin.  She states it is not painful.  She is unsure if it is growing in size.  She does not have any history of consistent tobacco use, no family history of head neck cancer, she denies any night sweats, fevers, weight loss.  She is here because this finding has caused her to be anxious regarding this.    INTERVAL HISTORY  2024: The patient returns today with continuation of her submental lymph node.  She is unsure if it has gotten any larger but she says it is noticeable.  It does cause her discomfort when she moves around.  She has not had any fevers or night sweats.  No unintended weight loss.  She is hoping to have this removed and sent for pathology.  2024: Patient is 10 days post op from excision of submental lymph node  for biopsy which is favored to be reactive based on pathology and flow cytometry.    Past Medical History  Past Medical History:    Back problem    Bronchitis    Cardiomyopathy (HCC)    \"enlarged heart\" per pt    Fibromyalgia    GERD (gastroesophageal reflux disease)    repeat EGD in  due to esophagitis    High blood pressure    Problems with swallowing    Tubular adenoma of colon    repeat CLN in - sees Dr. Allen at [facility]       Past Surgical History  Past Surgical History:   Procedure Laterality Date          Colonoscopy N/A 2019    Procedure: COLONOSCOPY;  Surgeon: SARAH Pina MD;  Location: [facility] ENDOSCOPY     Colonoscopy N/A 09/01/2020    Procedure: COLONOSCOPY;  Surgeon: SARAH Pina MD;  Location: [facility] ENDOSCOPY    Colonoscopy      Cyst aspiration right  2014    Hysterectomy      38    Desmond localization wire 1 site right (cpt=[id number])  2005    Needle biopsy right      in her 20's       Family History  Family History   Problem Relation Age of Onset    Other (Other) Mother     Heart Disorder Father     Prostate Cancer Maternal Grandfather 77    Breast Cancer Paternal Aunt         in her 50's       Social History  Pediatric History   Patient Parents    Not on file     Other Topics Concern    Not on file   Social History Narrative    Not on file           Current Medications:  Current Outpatient Medications   Medication Sig Dispense Refill    pantoprazole 40 MG Oral Tab EC Take 1 tablet (40 mg total) by mouth every morning before breakfast. 90 tablet 3    cephalexin 500 MG Oral Cap Take 1 capsule (500 mg total) by mouth 2 (two) times daily. 14 capsule 0    acetaminophen-codeine 300-30 MG Oral Tab Take 2 tablets by mouth every 6 (six) hours as needed for Pain. 30 tablet 0    Phentermine HCl 15 MG Oral Cap Take 1 capsule (15 mg total) by mouth every morning. 30 capsule 3    atenolol 50 MG Oral Tab Take 1 tablet (50 mg total) by mouth daily. 90 tablet 3    losartan 50 MG Oral Tab Take 1 tablet (50 mg total) by mouth daily. 90 tablet 3    hydroCHLOROthiazide 25 MG Oral Tab Take 1 tablet (25 mg total) by mouth daily. 90 tablet 3    KETOCONAZOLE 2 % External Shampoo APPLY TO SCALP 2 TIMES PER WEEK 120 mL 0    ketoconazole 2 % External Cream APPLY  CREAM TOPICALLY TO AFFECTED AREA TWICE DAILY 30 g 0    Multiple Vitamin (MULTI-VITAMIN) Oral Tab Take 1 tablet by mouth daily.      Aspirin 81 MG Oral Tab Take 1 tablet (81 mg total) by mouth daily.         Allergies  Allergies   Allergen Reactions    Latex HIVES    Morphine Sulfate ITCHING       Review of Systems:   A comprehensive 10 point review of systems was completed.  Pertinent  positives and negatives noted in the the HPI.    Physical Exam:   There were no vitals taken for this visit.    GENERAL: No acute distress, Comfortable appearing  FACE: HB 1/6, Normal Animation  HEAD: Normocephalic  EYES: EOMI, pupils equil  EARS: Bilateral Auricles Symmetric, bilateral tympanic membranes normal  NOSE: Nares patent bilaterally  ORAL CAVITY: Tongue mobile, Oropharynx clear, Floor of mouth clear, Posterior oropharynx normal  NECK: There is a single submental lymph node which is soft, freely mobile, nontender, and approximately 1.5 cm in size, largely unchanged from previous exam, thyroid not palpable, nontender    Results:     Laboratory Data:  Lab Results   Component Value Date    WBC 6.1 07/11/2024    HGB 13.9 07/11/2024    HCT 42.4 07/11/2024    .0 07/11/2024    CREATSERUM 1.12 (H) 07/11/2024    BUN 20 07/11/2024     07/11/2024    K 3.7 07/11/2024     07/11/2024    CO2 27.0 07/11/2024     (H) 07/11/2024    CA 9.4 07/11/2024    ALB 4.6 07/11/2024    ALKPHO 118 07/11/2024    TP 7.6 07/11/2024    AST 18 07/11/2024    ALT 12 07/11/2024    PTT 24.0 07/11/2024    INR 0.96 07/11/2024    PTP 13.4 07/11/2024    T4F 1.2 08/09/2023    TSH 1.516 04/01/2024    LIP 64 (H) 01/17/2019    ESRML 26 05/20/2024    CRP 2.00 (H) 05/20/2024    MG 2.2 03/03/2023    TROP <0.045 05/07/2021     08/15/2022    B12 1,575 (H) 04/01/2024         Imaging:  MRI BRAIN (CPT=70551)    Result Date: 5/3/2024  PROCEDURE: MRI BRAIN (CPT=70551)  COMPARISON: None.  INDICATIONS: G31.84 MCI (mild cognitive impairment) with memory loss  TECHNIQUE: A variety of imaging planes and parameters were utilized for visualization of suspected pathology.  Images were performed without contrast.  FINDINGS:  CEREBRUM: Scattered T2/FLAIR signal hyperintense foci within the superficial and deep cerebral white matter without mass effect or diffusion restriction.  There is otherwise no intra-axial mass, mass effect, or  hemorrhage.  No abnormal diffusion restriction to suggest acute or subacute infarct. CEREBELLUM: No edema, hemorrhage, mass, acute infarction, or inappropriate atrophy.  BRAINSTEM: No edema, hemorrhage, mass, acute infarction, or inappropriate atrophy.  CSF SPACES: Ventricles, cisterns, and sulci are appropriate for age.  No hydrocephalus, subarachnoid hemorrhage, or mass.  SKULL: No mass or other significant visible lesion.  SINUSES: Limited views demonstrate no significant mucosal thickening or fluid.  ORBITS: Limited views are unremarkable.  OTHER: Negative.          CONCLUSION: No acute intracranial process.  Scattered T2/FLAIR signal changes within the cerebral white matter which may represent sequela of prior inflammation, prior infection, or microvascular white matter ischemic change related to longstanding hypertension and/or diabetes.    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  No acute lymphadenitis, granulomas, necrosis, or malignancy identified (see comment).     Comment:  Sections demonstrate preservation of the lymph node architecture with expansion of the paracortical regions comprised of mature polymorphous lymphocytes.  Focal areas of reactive sinus histiocytosis are also seen.  No acute inflammatory infiltrates, granulomas, necrosis, or primary or metastatic malignant neoplastic infiltrates are identified.       Immunophenotyping by flow cytometry demonstrates no evidence of a monotypic B cell population or T cell aberrancy by markers assayed.      Overall, the morphologic and immunophenotypic findings are consistent with paracortical reactive lymphoid cell hyperplasia.   Clinically, paracortical lymphoid cell hyperplasias are commonly seen in association with viral infections,  are occasionally iatrogenically induced hypersensitivity reactions (especially to medications), or may follow vaccinations.       Recommend clinical and radiologic correlation, as well as correlation with pending cultures.     For  purposes, this case was reviewed by a second pathologist who concurred with the diagnosis.      Electronically signed by Parmjit Massey MD on 7/22/2024 at 10:37 AM         Impression:   Cervical lymphadenopathy, reactive  Odynophagia  GERD    Recommendations:  I discussed with the patient her pathology results came back as benign and likely reactive.   I would like her to return to see me if she has any issues  She will keep the incision out of the sun and avoid tension in the area    Thank you for allowing me to participate in the care of your patient.    Jose Wetzel, DO   Otolaryngology/Rhinology, Sinus, and Endoscopic Skull Base Surgery  [facility]   [address] [place] [id number]  Phone [phone]  Fax [phone]  5/28/2024  9:32 AM  7/29/2024

## 2024-07-31 ENCOUNTER — TELEPHONE (OUTPATIENT)
Dept: OTOLARYNGOLOGY | Facility: CLINIC | Age: 59
End: 2024-07-31

## 2024-07-31 ENCOUNTER — PATIENT MESSAGE (OUTPATIENT)
Dept: OTOLARYNGOLOGY | Facility: CLINIC | Age: 59
End: 2024-07-31

## 2024-07-31 NOTE — TELEPHONE ENCOUNTER
Pt called.  Surgery 7-19-24.  Under surgical site is swollen and feel lumps.  Call transferred to the nurse.

## 2024-07-31 NOTE — TELEPHONE ENCOUNTER
Called and spoke to patient, informed her Dr. Wetzel has reviewed the picture of her chin. Per Dr. Wetzel, this is to be expected during the healing process. He wants to see you again next week, appointment is scheduled for 8/6/24.   Reviewed with patient if increased swelling, increased pain, tenderness, or redness to call our office right away. Patient understanding.

## 2024-07-31 NOTE — TELEPHONE ENCOUNTER
please see note below, and picture. Pt states swelling under chin, and a small lump under incision that is hard. No tenderness. Offered pt simone today or tomorrow. Pt declined. Scheduled next week.

## 2024-08-06 ENCOUNTER — OFFICE VISIT (OUTPATIENT)
Facility: LOCATION | Age: 59
End: 2024-08-06

## 2024-08-06 DIAGNOSIS — R59.0 CERVICAL LYMPHADENOPATHY: Primary | ICD-10-CM

## 2024-08-06 PROCEDURE — 99024 POSTOP FOLLOW-UP VISIT: CPT | Performed by: STUDENT IN AN ORGANIZED HEALTH CARE EDUCATION/TRAINING PROGRAM

## 2024-08-06 NOTE — PROGRESS NOTES
[place]  OTOLARYNGOLOGY - HEAD & NECK SURGERY    8/6/2024     Reason for Consultation:   Neck lump    History of Present Illness:   Patient is a pleasant 59 year old female who is being seen for 3 to 4 weeks of a lump that she noticed in her submental area.  The patient states that she has had recent episodes of laryngitis and was having some throat pain with swallowing.  She does also have a history of reflux and has an appointment with a gastroenterologist coming up.  She was then feeling her neck and noticed a lump underneath her chin.  She states it is not painful.  She is unsure if it is growing in size.  She does not have any history of consistent tobacco use, no family history of head neck cancer, she denies any night sweats, fevers, weight loss.  She is here because this finding has caused her to be anxious regarding this.    INTERVAL HISTORY  7/9/2024: The patient returns today with continuation of her submental lymph node.  She is unsure if it has gotten any larger but she says it is noticeable.  It does cause her discomfort when she moves around.  She has not had any fevers or night sweats.  No unintended weight loss.  She is hoping to have this removed and sent for pathology.  7/29/2024: Patient is 10 days post op from excision of submental lymph node  for biopsy which is favored to be reactive based on pathology and flow cytometry.  8/6/2024: Patient returns today because she was having some firmness around her incision.  She denies any pain around the incision.  She was just concerned that she had some fullness here.    Past Medical History  Past Medical History:    Back problem    Bronchitis    Cardiomyopathy (HCC)    \"enlarged heart\" per pt    Fibromyalgia    GERD (gastroesophageal reflux disease)    repeat EGD in 2020 due to esophagitis    High blood pressure    Problems with swallowing    Tubular adenoma of colon    repeat CLN in 2023- sees Dr. Allen at [facility]       Past Surgical  History  Past Surgical History:   Procedure Laterality Date          Colonoscopy N/A 2019    Procedure: COLONOSCOPY;  Surgeon: SARAH Pina MD;  Location: [facility] ENDOSCOPY    Colonoscopy N/A 2020    Procedure: COLONOSCOPY;  Surgeon: SARAH Pina MD;  Location: [facility] ENDOSCOPY    Colonoscopy      Cyst aspiration right      Hysterectomy      38    Desmond localization wire 1 site right (cpt=19281)      Needle biopsy right      in her 20's    Other surgical history  2024    cervical lymphadenopathy       Family History  Family History   Problem Relation Age of Onset    Other (Other) Mother     Heart Disorder Father     Prostate Cancer Maternal Grandfather 77    Breast Cancer Paternal Aunt         in her 50's       Social History  Pediatric History   Patient Parents    Not on file     Other Topics Concern    Not on file   Social History Narrative    Not on file           Current Medications:  Current Outpatient Medications   Medication Sig Dispense Refill    pantoprazole 40 MG Oral Tab EC Take 1 tablet (40 mg total) by mouth every morning before breakfast. 90 tablet 3    cephalexin 500 MG Oral Cap Take 1 capsule (500 mg total) by mouth 2 (two) times daily. 14 capsule 0    acetaminophen-codeine 300-30 MG Oral Tab Take 2 tablets by mouth every 6 (six) hours as needed for Pain. 30 tablet 0    Phentermine HCl 15 MG Oral Cap Take 1 capsule (15 mg total) by mouth every morning. 30 capsule 3    atenolol 50 MG Oral Tab Take 1 tablet (50 mg total) by mouth daily. 90 tablet 3    losartan 50 MG Oral Tab Take 1 tablet (50 mg total) by mouth daily. 90 tablet 3    hydroCHLOROthiazide 25 MG Oral Tab Take 1 tablet (25 mg total) by mouth daily. 90 tablet 3    KETOCONAZOLE 2 % External Shampoo APPLY TO SCALP 2 TIMES PER WEEK 120 mL 0    ketoconazole 2 % External Cream APPLY  CREAM TOPICALLY TO AFFECTED AREA TWICE DAILY 30 g 0    Multiple Vitamin (MULTI-VITAMIN) Oral Tab Take 1 tablet by mouth daily.       Aspirin 81 MG Oral Tab Take 1 tablet (81 mg total) by mouth daily.         Allergies  Allergies   Allergen Reactions    Latex HIVES    Morphine Sulfate ITCHING       Review of Systems:   A comprehensive 10 point review of systems was completed.  Pertinent positives and negatives noted in the the HPI.    Physical Exam:   There were no vitals taken for this visit.    GENERAL: No acute distress, Comfortable appearing  FACE: HB 1/6, Normal Animation  HEAD: Normocephalic  EYES: EOMI, pupils equil  EARS: Bilateral Auricles Symmetric, bilateral tympanic membranes normal  NOSE: Nares patent bilaterally  ORAL CAVITY: Tongue mobile, Oropharynx clear, Floor of mouth clear, Posterior oropharynx normal  NECK: Submental incision is healing well with some mild induration surrounding the incision, nontender    Results:     Laboratory Data:  Lab Results   Component Value Date    WBC 6.1 07/11/2024    HGB 13.9 07/11/2024    HCT 42.4 07/11/2024    .0 07/11/2024    CREATSERUM 1.12 (H) 07/11/2024    BUN 20 07/11/2024     07/11/2024    K 3.7 07/11/2024     07/11/2024    CO2 27.0 07/11/2024     (H) 07/11/2024    CA 9.4 07/11/2024    ALB 4.6 07/11/2024    ALKPHO 118 07/11/2024    TP 7.6 07/11/2024    AST 18 07/11/2024    ALT 12 07/11/2024    PTT 24.0 07/11/2024    INR 0.96 07/11/2024    PTP 13.4 07/11/2024    T4F 1.2 08/09/2023    TSH 1.516 04/01/2024    LIP 64 (H) 01/17/2019    ESRML 26 05/20/2024    CRP 2.00 (H) 05/20/2024    MG 2.2 03/03/2023    TROP <0.045 05/07/2021     08/15/2022    B12 1,575 (H) 04/01/2024         Imaging:  MRI BRAIN (CPT=70551)    Result Date: 5/3/2024  PROCEDURE: MRI BRAIN (CPT=70551)  COMPARISON: None.  INDICATIONS: G31.84 MCI (mild cognitive impairment) with memory loss  TECHNIQUE: A variety of imaging planes and parameters were utilized for visualization of suspected pathology.  Images were performed without contrast.  FINDINGS:  CEREBRUM: Scattered T2/FLAIR signal hyperintense  foci within the superficial and deep cerebral white matter without mass effect or diffusion restriction.  There is otherwise no intra-axial mass, mass effect, or hemorrhage.  No abnormal diffusion restriction to suggest acute or subacute infarct. CEREBELLUM: No edema, hemorrhage, mass, acute infarction, or inappropriate atrophy.  BRAINSTEM: No edema, hemorrhage, mass, acute infarction, or inappropriate atrophy.  CSF SPACES: Ventricles, cisterns, and sulci are appropriate for age.  No hydrocephalus, subarachnoid hemorrhage, or mass.  SKULL: No mass or other significant visible lesion.  SINUSES: Limited views demonstrate no significant mucosal thickening or fluid.  ORBITS: Limited views are unremarkable.  OTHER: Negative.          CONCLUSION: No acute intracranial process.  Scattered T2/FLAIR signal changes within the cerebral white matter which may represent sequela of prior inflammation, prior infection, or microvascular white matter ischemic change related to longstanding hypertension and/or diabetes.    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  No acute lymphadenitis, granulomas, necrosis, or malignancy identified (see comment).     Comment:  Sections demonstrate preservation of the lymph node architecture with expansion of the paracortical regions comprised of mature polymorphous lymphocytes.  Focal areas of reactive sinus histiocytosis are also seen.  No acute inflammatory infiltrates, granulomas, necrosis, or primary or metastatic malignant neoplastic infiltrates are identified.       Immunophenotyping by flow cytometry demonstrates no evidence of a monotypic B cell population or T cell aberrancy by markers assayed.      Overall, the morphologic and immunophenotypic findings are consistent  with paracortical reactive lymphoid cell hyperplasia.   Clinically, paracortical lymphoid cell hyperplasias are commonly seen in association with viral infections, are occasionally iatrogenically induced hypersensitivity reactions (especially to medications), or may follow vaccinations.       Recommend clinical and radiologic correlation, as well as correlation with pending cultures.     For  purposes, this case was reviewed by a second pathologist who concurred with the diagnosis.      Electronically signed by Parmjit Massey MD on 7/22/2024 at 10:37 AM         Impression:   Cervical lymphadenopathy, reactive  Odynophagia  GERD    Recommendations:  Patients submental incision is healing well.  I discussed with her that the firmness around the incision is part of the healing process and will continue to resolve over the coming months.  She will return to see me at the 3-month rey to reevaluate the area.  She can return to see me sooner if she has any issues.    Thank you for allowing me to participate in the care of your patient.    Jose Wetzel, DO   Otolaryngology/Rhinology, Sinus, and Endoscopic Skull Base Surgery  [facility]   [address] [place] [address] [address] [place] [id number]  Phone [phone]  Fax [phone]  5/28/2024  9:32 AM  8/6/2024

## 2024-08-15 NOTE — PROGRESS NOTES
[facility] - Gastroenterology                                                                                                               Reason for consult: eval    Requesting physician or provider: Raquel Danielle MD    Chief Complaint   Patient presents with    Consult     For trouble swallowing       HPI:   Triny Head is a 59 year old year-old female with history of TA of colon, GERD, HTN:    she is here today for f/U  Known to Dr. Pina    She says approx 6 mos ago started to have increase mucus in her throat.  Has had intermittent dysphagia with solids above sternal notch. She denies pnd. No odynophagia, globus.  She endorses gerd. Takes pantoprazole with improvement in reflux no change in mucus, dysphagia.   She has had ache in epigastric area.. she denies nausea and/or vomiting.  she denies recent change in appetite and/or unintentional weight loss.    she moves her bowels 2 times per day and without recent change. she denies straining and/or incomplete evacuation.  she denies brbpr and/or melena.    Saw ent for laryngitis  had lump removed that was benign    NSAIDS: no  Tobacco: no  Alcohol: occasion  Marijuana: no  Illicit drugs: no    No FH GI malignancy    No history of adverse reaction to sedation  No JONAS  No anticoagulants  No pacemaker/defibrillator  No pain medications and/or sleep aides    EGD/CLN 2020  The etiology of iron deficiency anemia is not endoscopically apparent. EGD shows mild reflux esophagitis in the setting of a small hiatal hernia.  Colonoscopy was incomplete due to severely tortuous colon. Patient did have a colonoscopy completed in 7/2019 (reached cecum)--->at that time four small polyps removed, ascending colon lipoma, diverticulosis. C-scope repeated this year due to iron deficiency anemia and circumferential luminal narrowing in the ascending colon (on CT A/P imaging), and now unable to reach cecum.   PATH: neg Hpylori    C-scope  2023 at [facility] no polyps  5 y crc screening interval advised    Wt Readings from Last 6 Encounters:   24 214 lb (97.1 kg)   24 212 lb (96.2 kg)   24 212 lb (96.2 kg)   24 210 lb (95.3 kg)   24 213 lb (96.6 kg)   24 212 lb (96.2 kg)        History, Medications, Allergies, ROS:      Past Medical History:    Back problem    Bronchitis    Cardiomyopathy (HCC)    \"enlarged heart\" per pt    Essential hypertension    Fibromyalgia    GERD (gastroesophageal reflux disease)    repeat EGD in  due to esophagitis    High blood pressure    Problems with swallowing    Tubular adenoma of colon    repeat CLN in - sees Dr. Allen at [facility]      Past Surgical History:   Procedure Laterality Date          Colonoscopy N/A 2019    Procedure: COLONOSCOPY;  Surgeon: SARAH Pina MD;  Location: [facility] ENDOSCOPY    Colonoscopy N/A 2020    Procedure: COLONOSCOPY;  Surgeon: SARAH Pina MD;  Location: [facility] ENDOSCOPY    Colonoscopy      Cyst aspiration right  2014    Hysterectomy      38    Desmond localization wire 1 site right (cpt=19281)      Needle biopsy right      in her 20's    Other surgical history  2024    cervical lymphadenopathy    Total abdom hysterectomy      15 yrs ago      Family Hx:   Family History   Problem Relation Age of Onset    Other (Other) Mother     Heart Disorder Father     Prostate Cancer Maternal Grandfather 77    Breast Cancer Paternal Aunt         in her 50's      Social History:   Social History     Socioeconomic History    Marital status: Single   Tobacco Use    Smoking status: Former     Types: Cigarettes     Start date:     Smokeless tobacco: Former   Vaping Use    Vaping status: Never Used   Substance and Sexual Activity    Alcohol use: Yes     Alcohol/week: 1.0 standard drink of alcohol     Types: 1 Cans of beer per week     Comment: Maybe once other week.    Drug use: No        Medications (Active prior to today's  visit):  Current Outpatient Medications   Medication Sig Dispense Refill    pantoprazole 40 MG Oral Tab EC Take 1 tablet (40 mg total) by mouth every morning before breakfast. 90 tablet 3    Phentermine HCl 15 MG Oral Cap Take 1 capsule (15 mg total) by mouth every morning. 30 capsule 3    atenolol 50 MG Oral Tab Take 1 tablet (50 mg total) by mouth daily. 90 tablet 3    losartan 50 MG Oral Tab Take 1 tablet (50 mg total) by mouth daily. 90 tablet 3    hydroCHLOROthiazide 25 MG Oral Tab Take 1 tablet (25 mg total) by mouth daily. 90 tablet 3    ketoconazole 2 % External Cream APPLY  CREAM TOPICALLY TO AFFECTED AREA TWICE DAILY 30 g 0    Multiple Vitamin (MULTI-VITAMIN) Oral Tab Take 1 tablet by mouth daily.      Aspirin 81 MG Oral Tab Take 1 tablet (81 mg total) by mouth daily.      cephalexin 500 MG Oral Cap Take 1 capsule (500 mg total) by mouth 2 (two) times daily. 14 capsule 0    acetaminophen-codeine 300-30 MG Oral Tab Take 2 tablets by mouth every 6 (six) hours as needed for Pain. 30 tablet 0    KETOCONAZOLE 2 % External Shampoo APPLY TO SCALP 2 TIMES PER WEEK 120 mL 0       Allergies:  Allergies   Allergen Reactions    Latex HIVES    Morphine Sulfate ITCHING       ROS:   CONSTITUTIONAL: negative for fevers, chills, sweats and weight loss  EYES Negative for red eyes, yellow eyes, changes in vision  HEENT: Positive for dysphagia and negative for hoarseness  RESPIRATORY: Negative for cough and shortness of breath  CARDIOVASCULAR: Negative for chest pain, palpitations  GASTROINTESTINAL: See HPI  GENITOURINARY: Negative for dysuria and frequency  MUSCULOSKELETAL: Negative for arthralgias and myalgias  NEUROLOGICAL: Negative for dizziness and headaches  BEHAVIOR/PSYCH: Negative for anxiety and poor appetite    PHYSICAL EXAM:   Blood pressure 132/85, pulse 70, height 5' 2\" (1.575 m), weight 214 lb (97.1 kg).    GEN: WD/WN, NAD  HEENT: Supple symmetrical, trachea midline  CV: RRR, the extremities are warm and well  perfused   LUNGS: No increased work of breathing  ABDOMEN: No scars, normal bowel sounds, soft, non-tender, non-distended no rebound or guarding, no masses, no hepatomegaly  MSK: No redness, no warmth, no swelling of joints  SKIN: No jaundice, no erythema, no rashes  HEMATOLOGIC: No bleeding, no bruising  NEURO: Alert and interactive, normal gait    Labs/Imaging/Procedures:     Cln 7/2023       Admission Diagnosis:    - Blood in stool    - Hx of adenomatous colonic polyps    - Tortuous colon     Procedures:    - COLONOSCOPY     .  ASSESSMENT/PLAN:   Triny Head is a 59 year old year-old female with history of TA of colon, GERD, HTN:    #gerd  #dysphagia  #epigastric pain  Chronic gerd controlled on pantoprazole.  For last 6 mos has had increased phlegm w/ sx of dysphagia w/ solids and ache in epigastric area. Weight stable. No fhx gi malignancy. Plan below    #crc screening  Colonoscopy at [facility] 7/2023. No polyps noted, told to repeat in 2028.     -crc screening 7/2028 unless otherwise indicated  -reflux diet modification  -pantoprazole 40 mg twice daily x 4 weeks and then reduce dose to once daily  -esophagram  -avoid hard solids, extra care with chewing, swallowing  -er if condition decline    Egd w/ mac w/ gen pool md w/ possible dil  Dx: gerd, dysphagia, epigastric pain  Hold phentermine 7 days prior to procedure    Orders This Visit:  No orders of the defined types were placed in this encounter.      Meds This Visit:  Requested Prescriptions      No prescriptions requested or ordered in this encounter       Imaging & Referrals:  None    ENDOSCOPIC RISK BENEFIT DISCUSSION: I described the procedure in great detail with the patient. I discussed the risks and benefits, including but not limited to: bleeding, perforation, infection, anesthesia complications, and even death. Patient will be NPO after midnight and will have a person physically present at time of pick-up to drive patient home. Patient verbalized  understanding and agrees to proceed with procedure as planned.    Lori Reyes, APRN   8/20/2024        This note was partially prepared using [employer] voice recognition dictation software. As a result, errors may occur. When identified, these errors have been corrected. While every attempt is made to correct errors during dictation, discrepancies may still exist.

## 2024-08-20 ENCOUNTER — TELEPHONE (OUTPATIENT)
Facility: CLINIC | Age: 59
End: 2024-08-20

## 2024-08-20 ENCOUNTER — OFFICE VISIT (OUTPATIENT)
Facility: CLINIC | Age: 59
End: 2024-08-20
Payer: MEDICAID

## 2024-08-20 VITALS
HEIGHT: 62 IN | BODY MASS INDEX: 39.38 KG/M2 | HEART RATE: 70 BPM | WEIGHT: 214 LBS | SYSTOLIC BLOOD PRESSURE: 132 MMHG | DIASTOLIC BLOOD PRESSURE: 85 MMHG

## 2024-08-20 DIAGNOSIS — R10.13 EPIGASTRIC PAIN: ICD-10-CM

## 2024-08-20 DIAGNOSIS — R13.19 ESOPHAGEAL DYSPHAGIA: ICD-10-CM

## 2024-08-20 DIAGNOSIS — Z12.11 COLON CANCER SCREENING: Primary | ICD-10-CM

## 2024-08-20 DIAGNOSIS — K21.9 GASTROESOPHAGEAL REFLUX DISEASE, UNSPECIFIED WHETHER ESOPHAGITIS PRESENT: Primary | ICD-10-CM

## 2024-08-20 DIAGNOSIS — R13.10 DYSPHAGIA, UNSPECIFIED TYPE: ICD-10-CM

## 2024-08-20 DIAGNOSIS — K21.9 GASTROESOPHAGEAL REFLUX DISEASE, UNSPECIFIED WHETHER ESOPHAGITIS PRESENT: ICD-10-CM

## 2024-08-20 PROCEDURE — 99215 OFFICE O/P EST HI 40 MIN: CPT | Performed by: NURSE PRACTITIONER

## 2024-08-20 RX ORDER — PANTOPRAZOLE SODIUM 40 MG/1
40 TABLET, DELAYED RELEASE ORAL
Qty: 60 TABLET | Refills: 0 | Status: SHIPPED | OUTPATIENT
Start: 2024-08-20 | End: 2024-09-19

## 2024-08-20 NOTE — TELEPHONE ENCOUNTER
Scheduled for:  EGD 76971 w/ Dil  Provider Name:  Dr. Pina  Date:  11/26/2024  Location:  [facility]  Sedation:  MAC  Time:  11:25am, pt is aware emh will call with arrival time  Prep:  NPO  Meds/Allergies Reconciled?:  Physician reviewed     Diagnosis with codes:  gerd K21.9, dysphagia R13.10, epigastric pain r10.13  Was patient informed to call insurance with codes (Y/N):  Yes, I confirmed [employer] insurance with this patient.      Referral sent?:  Referral was sent at the time of electronic surgical scheduling.   [facility] or [facility] notified?:  I sent an electronic request to Endo Scheduling and received a confirmation today.      Medication Orders:  Hold phentermine 7 days prior to procedure   Misc Orders:  n/a     Further instructions given by staff:   Patient states received instructions in office

## 2024-08-20 NOTE — PATIENT INSTRUCTIONS
-crc screening 7/2028 unless otherwise indicated  -reflux diet modification  -pantoprazole 40 mg twice daily x 4 weeks and then reduce dose to once daily  -esophagram  -avoid hard solids, extra care with chewing, swallowing  -er if condition decline    Egd w/ mac w/ gen pool md w /possible dil  Dx: gerd, dysphagia, epigastric pain  Hold phentermine 7 days prior to procedure    Tips to Control Acid Reflux    To control acid reflux, you’ll need to make some basic diet and lifestyle changes. The simple steps outlined below may be all you’ll need to ease discomfort.   Watch what you eat  Don't have fatty foods or spicy foods.  Eat fewer acidic foods, such as citrus and tomato-based foods. These can increase symptoms.  Limit drinking alcohol, caffeine, and fizzy beverages. All increase acid reflux.  Try limiting chocolate, peppermint, and spearmint. These can make acid reflux worse in some people.     Watch when you eat  Don't lie down for 3 hours after eating.  Don't snack before going to bed.     Raise your head  Raising your head and upper body by 4 to 6 inches helps limit reflux when you’re lying down. Put blocks under the head of your bed frame or a wedge under your mattress to raise it.   Other changes  Lose weight, if you need to  Don’t exercise near bedtime  Don't wear tight-fitting clothes  Limit aspirin and ibuprofen  Stop smoking     [employer] last reviewed this educational content on 6/1/2019 © 2000-2020 [employer]. [address] [place] [address], [place] [address]. All rights reserved. This information is not intended as a substitute for professional medical care. Always follow your healthcare professional's instructions.

## 2024-08-20 NOTE — TELEPHONE ENCOUNTER
Patient was seen in office today with GABY Morales and was given orders to schedule. Please call patient to schedule his/her procedure with the orders given below. Patient was given the phone number and prep instructions at the time of the office visit. The prep instructions was also explained to the patient at the time of the visit. The patient verbalized understanding the prep and that a GI  will call him/her for the procedure.  If patient calls before the 1 week turnaround please schedule with the orders given below, thank you!    Orders from GABY Morales  Per pt request Friday    Instructions       -crc screening 7/2028 unless otherwise indicated  -reflux diet modification  -pantoprazole 40 mg twice daily x 4 weeks and then reduce dose to once daily  -esophagram  -avoid hard solids, extra care with chewing, swallowing  -er if condition decline     Egd w/ mac w/ gen pool md w /possible dil  Dx: gerd, dysphagia, epigastric pain  Hold phentermine 7 days prior to procedure

## 2024-08-27 ENCOUNTER — HOSPITAL ENCOUNTER (OUTPATIENT)
Dept: GENERAL RADIOLOGY | Facility: HOSPITAL | Age: 59
Discharge: HOME | End: 2024-08-27
Attending: NURSE PRACTITIONER
Payer: MEDICAID

## 2024-08-27 DIAGNOSIS — R13.19 ESOPHAGEAL DYSPHAGIA: ICD-10-CM

## 2024-08-27 DIAGNOSIS — R10.13 EPIGASTRIC PAIN: ICD-10-CM

## 2024-08-27 DIAGNOSIS — K21.9 GASTROESOPHAGEAL REFLUX DISEASE, UNSPECIFIED WHETHER ESOPHAGITIS PRESENT: ICD-10-CM

## 2024-08-27 PROCEDURE — 74246 X-RAY XM UPR GI TRC 2CNTRST: CPT | Performed by: NURSE PRACTITIONER

## 2024-08-29 ENCOUNTER — TELEPHONE (OUTPATIENT)
Dept: GASTROENTEROLOGY | Facility: CLINIC | Age: 59
End: 2024-08-29

## 2024-08-29 NOTE — TELEPHONE ENCOUNTER
Patient has been moved up to 9/17/24. Please see telephone encounter 8/20/24 for documentation.     BENTLEY Sandoval

## 2024-08-29 NOTE — TELEPHONE ENCOUNTER
Esophagram 8/27/24    Impression  CONCLUSION:  1. Normal cervical esophagus and swallowing.  2. Small sliding hiatal hernia with associated Schatzki's mucosal ring.  No significant luminal narrowing.  3. Mild gastroesophageal reflux without esophageal mucosal abnormalities.  4. Normal appearing stomach and duodenum.    Recommend:  Pantoprazole  Reflux diet modification  Avoid hard solids  Extra care with chewing, swallowing  Er if condition decline    Plan for egd w/ mac w/ dil     Pt is scheduled for 11/26/24 and will attempt to expedite appt.     She verbalizes understanding and is in agreement with the plan.

## 2024-09-13 RX ORDER — IBUPROFEN 200 MG
200 TABLET ORAL EVERY 6 HOURS PRN
COMMUNITY

## 2024-09-17 ENCOUNTER — ANESTHESIA (OUTPATIENT)
Dept: ENDOSCOPY | Facility: HOSPITAL | Age: 59
End: 2024-09-17
Payer: MEDICAID

## 2024-09-17 ENCOUNTER — HOSPITAL ENCOUNTER (OUTPATIENT)
Facility: HOSPITAL | Age: 59
Setting detail: HOSPITAL OUTPATIENT SURGERY
Discharge: HOME | End: 2024-09-17
Attending: INTERNAL MEDICINE | Admitting: INTERNAL MEDICINE
Payer: MEDICAID

## 2024-09-17 ENCOUNTER — ANESTHESIA EVENT (OUTPATIENT)
Dept: ENDOSCOPY | Facility: HOSPITAL | Age: 59
End: 2024-09-17
Payer: MEDICAID

## 2024-09-17 DIAGNOSIS — R13.10 DYSPHAGIA, UNSPECIFIED TYPE: ICD-10-CM

## 2024-09-17 DIAGNOSIS — R10.13 EPIGASTRIC PAIN: ICD-10-CM

## 2024-09-17 DIAGNOSIS — K21.9 GASTROESOPHAGEAL REFLUX DISEASE, UNSPECIFIED WHETHER ESOPHAGITIS PRESENT: ICD-10-CM

## 2024-09-17 PROBLEM — K22.2 SCHATZKI'S RING: Status: ACTIVE | Noted: 2024-09-17

## 2024-09-17 PROCEDURE — 43249 ESOPH EGD DILATION <30 MM: CPT | Performed by: INTERNAL MEDICINE

## 2024-09-17 PROCEDURE — 0DB18ZX EXCISION OF UPPER ESOPHAGUS, VIA NATURAL OR ARTIFICIAL OPENING ENDOSCOPIC, DIAGNOSTIC: ICD-10-PCS | Performed by: INTERNAL MEDICINE

## 2024-09-17 PROCEDURE — 0D738ZZ DILATION OF LOWER ESOPHAGUS, VIA NATURAL OR ARTIFICIAL OPENING ENDOSCOPIC: ICD-10-PCS | Performed by: INTERNAL MEDICINE

## 2024-09-17 PROCEDURE — 0DB68ZX EXCISION OF STOMACH, VIA NATURAL OR ARTIFICIAL OPENING ENDOSCOPIC, DIAGNOSTIC: ICD-10-PCS | Performed by: INTERNAL MEDICINE

## 2024-09-17 PROCEDURE — 43239 EGD BIOPSY SINGLE/MULTIPLE: CPT | Performed by: INTERNAL MEDICINE

## 2024-09-17 RX ORDER — NALOXONE HYDROCHLORIDE 0.4 MG/ML
0.08 INJECTION, SOLUTION INTRAMUSCULAR; INTRAVENOUS; SUBCUTANEOUS ONCE AS NEEDED
Status: DISCONTINUED | OUTPATIENT
Start: 2024-09-17 | End: 2024-09-17

## 2024-09-17 RX ORDER — SODIUM CHLORIDE, SODIUM LACTATE, POTASSIUM CHLORIDE, CALCIUM CHLORIDE 600; 310; 30; 20 MG/100ML; MG/100ML; MG/100ML; MG/100ML
INJECTION, SOLUTION INTRAVENOUS CONTINUOUS
Status: DISCONTINUED | OUTPATIENT
Start: 2024-09-17 | End: 2024-09-17

## 2024-09-17 RX ORDER — LIDOCAINE HYDROCHLORIDE 10 MG/ML
INJECTION, SOLUTION EPIDURAL; INFILTRATION; INTRACAUDAL; PERINEURAL AS NEEDED
Status: DISCONTINUED | OUTPATIENT
Start: 2024-09-17 | End: 2024-09-17 | Stop reason: SURG

## 2024-09-17 RX ADMIN — LIDOCAINE HYDROCHLORIDE 80 MG: 10 INJECTION, SOLUTION EPIDURAL; INFILTRATION; INTRACAUDAL; PERINEURAL at 07:32:00

## 2024-09-17 RX ADMIN — SODIUM CHLORIDE, SODIUM LACTATE, POTASSIUM CHLORIDE, CALCIUM CHLORIDE: 600; 310; 30; 20 INJECTION, SOLUTION INTRAVENOUS at 07:28:00

## 2024-09-17 RX ADMIN — SODIUM CHLORIDE, SODIUM LACTATE, POTASSIUM CHLORIDE, CALCIUM CHLORIDE: 600; 310; 30; 20 INJECTION, SOLUTION INTRAVENOUS at 07:43:00

## 2024-09-17 NOTE — DISCHARGE INSTRUCTIONS
Home Care Instructions for Gastroscopy with Sedation    Diet:  - Resume your regular diet as tolerated unless otherwise instructed.  - Start with light meals to minimize bloating.  - Do not drink alcohol today.    Medication:  - If you have questions about resuming your normal medications, please contact your Primary Care Physician.    Activities:  - Take it easy today. Do not return to work today.  - Do not drive today.  - Do not operate any machinery today (including kitchen equipment).      Gastroscopy:  - You may have a sore throat for 2-3 days following the exam. This is normal. Gargling with warm salt water (1/2 tsp salt to 1 glass warm water) or using throat lozenges will help.  - If you experience any sharp pain in your neck, abdomen or chest, vomiting of blood, oral temperature over 100 degrees Fahrenheit, light-headedness or dizziness, or any other problems, contact your doctor.    **If unable to reach your doctor, please [facility] to the [facility]**    - Your referring physician will receive a full report of your examination.  - If you do not hear from your doctor's office within two weeks of your biopsy, please call them for your results.    You may be able to see your laboratory results in [employer] between 4 and 7 business days.  In some cases, your physician may not have viewed the results before they are released to [employer].  If you have questions regarding your results contact the physician who ordered the test/exam by phone or via [employer] by choosing \"Ask a Medical Question.\"

## 2024-09-17 NOTE — ANESTHESIA POSTPROCEDURE EVALUATION
Patient: Triny Head    Procedure Summary       Date: 09/17/24 Room / Location: [facility] ENDOSCOPY 01 / [facility] ENDOSCOPY    Anesthesia Start: 0728 Anesthesia Stop: 0746    Procedure: ESOPHAGOGASTRODUODENOSCOPY with Possible Dilation Diagnosis:       Gastroesophageal reflux disease, unspecified whether esophagitis present      Dysphagia, unspecified type      Epigastric pain      (hiatal hernia, schatzki ring)    Surgeons: SARAH Pina MD Anesthesiologist: Kaleb Aldridge CRNA    Anesthesia Type: MAC ASA Status: 3            Anesthesia Type: MAC    Vitals Value Taken Time   /73 09/17/24 0746        Pulse 72 09/17/24 0746   Resp 14 09/17/24 0746   SpO2 99 % 09/17/24 0746       [facility] AN Post Evaluation:   Patient Evaluated in PACU  Patient Participation: waiting for patient participation  Level of Consciousness: sleepy but conscious  Pain Management: adequate  Airway Patency:patent  Yes    Nausea/Vomiting: none  Cardiovascular Status: hemodynamically stable  Respiratory Status: acceptable and room air  Postoperative Hydration acceptable      Kaleb Aldridge CRNA  9/17/2024 7:46 AM

## 2024-09-17 NOTE — ANESTHESIA PREPROCEDURE EVALUATION
Anesthesia PreOp Note    HPI:     Triny Head is a 59 year old female who presents for preoperative consultation requested by: SARAH Pina MD    Date of Surgery: 2024    Procedure(s):  ESOPHAGOGASTRODUODENOSCOPY with Possible Dilation  Indication: Gastroesophageal reflux disease, unspecified whether esophagitis present / Dysphagia, unspecified type /Epigastric pain    Relevant Problems   No relevant active problems       NPO:  Last Liquid Consumption Date: 24  Last Liquid Consumption Time: 0615 (<1oz with meds)  Last Solid Consumption Date: 24  Last Solid Consumption Time: 193  Last Liquid Consumption Date: 24          History Review:  Patient Active Problem List    Diagnosis Date Noted    Inflammatory polyarthritis (HCC) 2024    Family history of autoimmune disorder 2024    ESR raised 2024    CRP elevated 2024    Chronic kidney disease 10/04/2023    Obesity (BMI 30-39.9) 2023    RUQ pain 2023    Gastroesophageal reflux disease without esophagitis 2023    Hiatal hernia     Tortuous colon     Numbness and tingling in both hands 2020    Numbness and tingling of both legs 2020    Esophageal polyp     Polyp of colon     Gastroesophageal reflux disease with esophagitis 2019    Hypertension 2012       Past Medical History:    Back problem    Bronchitis    Cardiomyopathy (HCC)    \"enlarged heart\" per pt    Essential hypertension    Fibromyalgia    GERD (gastroesophageal reflux disease)    repeat EGD in  due to esophagitis    High blood pressure    Prediabetes    Problems with swallowing    Tubular adenoma of colon    repeat CLN in - sees Dr. Allen at [facility]       Past Surgical History:   Procedure Laterality Date          Colonoscopy N/A 2019    Procedure: COLONOSCOPY;  Surgeon: SARAH Pina MD;  Location: [facility] ENDOSCOPY    Colonoscopy N/A 2020    Procedure: COLONOSCOPY;  Surgeon: Yovani  SARAH Mahajan MD;  Location: [facility] ENDOSCOPY    Colonoscopy      Cyst aspiration right  2014    Hysterectomy      38    Desmond localization wire 1 site right (cpt=19281)  2005    Needle biopsy right      in her 20's    Other surgical history  07/19/2024    cervical lymphadenopathy    Total abdom hysterectomy      15 yrs ago       Medications Prior to Admission   Medication Sig Dispense Refill Last Dose    ibuprofen (ADVIL) 200 MG Oral Tab Take 1 tablet (200 mg total) by mouth every 6 (six) hours as needed for Pain.   9/3/2024    Phentermine HCl 15 MG Oral Cap Take 1 capsule (15 mg total) by mouth every morning. 30 capsule 3 8/17/2024    atenolol 50 MG Oral Tab Take 1 tablet (50 mg total) by mouth daily. 90 tablet 3 9/17/2024    hydroCHLOROthiazide 25 MG Oral Tab Take 1 tablet (25 mg total) by mouth daily. 90 tablet 3 9/16/2024    Multiple Vitamin (MULTI-VITAMIN) Oral Tab Take 1 tablet by mouth daily.   9/10/2024    Aspirin 81 MG Oral Tab Take 1 tablet (81 mg total) by mouth daily.   8/17/2024    pantoprazole 40 MG Oral Tab EC Take 1 tablet (40 mg total) by mouth 2 (two) times daily before meals. 60 tablet 0     pantoprazole 40 MG Oral Tab EC Take 1 tablet (40 mg total) by mouth every morning before breakfast. 90 tablet 3 9/3/2024    cephalexin 500 MG Oral Cap Take 1 capsule (500 mg total) by mouth 2 (two) times daily. 14 capsule 0     acetaminophen-codeine 300-30 MG Oral Tab Take 2 tablets by mouth every 6 (six) hours as needed for Pain. 30 tablet 0 9/14/2024    losartan 50 MG Oral Tab Take 1 tablet (50 mg total) by mouth daily. 90 tablet 3 9/17/2024    KETOCONAZOLE 2 % External Shampoo APPLY TO SCALP 2 TIMES PER WEEK 120 mL 0     ketoconazole 2 % External Cream APPLY  CREAM TOPICALLY TO AFFECTED AREA TWICE DAILY 30 g 0  at PRN     Current Facility-Administered Medications Ordered in Epic   Medication Dose Route Frequency Provider Last Rate Last Admin    lactated ringers infusion   Intravenous Continuous SARAH Pina MD  20 mL/hr at 09/17/24 0713 New Bag at 09/17/24 0713     No current [facility]-ordered outpatient medications on file.       Allergies   Allergen Reactions    Latex HIVES    Morphine Sulfate ITCHING       Family History   Problem Relation Age of Onset    Other (Other) Mother     Heart Disorder Father     Prostate Cancer Maternal Grandfather 77    Breast Cancer Paternal Aunt         in her 50's     Social History     Socioeconomic History    Marital status: Single   Tobacco Use    Smoking status: Former     Types: Cigarettes     Start date: 1979    Smokeless tobacco: Former   Vaping Use    Vaping status: Never Used   Substance and Sexual Activity    Alcohol use: Yes     Alcohol/week: 1.0 standard drink of alcohol     Types: 1 Cans of beer per week     Comment: Socially    Drug use: No       Available pre-op labs reviewed.  Lab Results   Component Value Date    WBC 6.1 07/11/2024    RBC 5.51 (H) 07/11/2024    HGB 13.9 07/11/2024    HCT 42.4 07/11/2024    MCV 77.0 (L) 07/11/2024    MCH 25.2 (L) 07/11/2024    MCHC 32.8 07/11/2024    RDW 13.9 07/11/2024    .0 07/11/2024     Lab Results   Component Value Date     07/11/2024    K 3.7 07/11/2024     07/11/2024    CO2 27.0 07/11/2024    BUN 20 07/11/2024    CREATSERUM 1.12 (H) 07/11/2024     (H) 07/11/2024    CA 9.4 07/11/2024          Vital Signs:  Body mass index is 38.41 kg/m².   height is 1.575 m (5' 2\") and weight is 95.3 kg (210 lb). Her blood pressure is 139/75 and her pulse is 64. Her respiration is 13 and oxygen saturation is 96%.   Vitals:    09/13/24 1455 09/17/24 0659   BP:  139/75   Pulse:  64   Resp:  13   SpO2:  96%   Weight: 95.3 kg (210 lb)    Height: 1.575 m (5' 2\")         Anesthesia Evaluation     Patient summary reviewed and Nursing notes reviewed    No history of anesthetic complications   Airway   Mallampati: III  TM distance: >3 FB  Neck ROM: full  Dental      Comment: Teeth appear grossly intact. Patient denies any loose/cracked  teeth.        Pulmonary    (+) decreased breath sounds  (-) COPD, asthma  Cardiovascular   (+) hypertension  (-) CABG/stent, dysrhythmias, angina    ECG reviewed  Rhythm: regular  ROS comment: As per patient \"I have an enlarged heart\"    Neuro/Psych    (+)  neuromuscular disease,      (-) TIA, CVA    GI/Hepatic/Renal    (+) GERD, chronic renal disease    Endo/Other    (-) diabetes mellitus  Abdominal   (+) obese                 Anesthesia Plan:   ASA:  3  Plan:   MAC  Post-op Pain Management: Oral pain medication and IV analgesics  Informed Consent Plan and Risks Discussed With:  Patient  Discussed plan with:  Surgeon      I have informed Triny Njris and/or legal guardian or family member of the nature of the anesthetic plan, benefits, risks including possible dental damage if relevant, major complications, and any alternative forms of anesthetic management.   All of the patient's questions were answered to the best of my ability. The patient desires the anesthetic management as planned.  Kaleb Aldridge CRNA  9/17/2024 7:17 AM  Present on Admission:  **None**

## 2024-09-17 NOTE — OPERATIVE REPORT
ESOPHAGOGASTRODUODENOSCOPY (EGD) REPORT    Triny Head     1965 Age 59 year old   PCP Raquel Danielle MD Endoscopist Cindy Pina MD     Date of procedure: 24    Procedure: EGD w/cold biopsy + balloon dilation     Pre-operative diagnosis: Dysphagia    Post-operative diagnosis: Hiatal hernia, Schatzki's ring    Medications: MAC    Complications: none    Procedure:  Informed consent was obtained from the patient after the risks of the procedure were discussed, including but not limited to bleeding, perforation, aspiration, infection, or possibility of a missed lesion. After discussions of the risks/benefits and alternatives to this procedure, as well as the planned sedation, the patient was placed in the left lateral decubitus position and begun on continuous blood pressure pulse oximetry and EKG monitoring and this was maintained throughout the procedure. Once an adequate level of sedation was obtained a bite block was placed. Then the lubricated tip of the [id number] diagnostic video upper endoscope was inserted and advanced using direct visualization into the posterior pharynx and ultimately into the esophagus.    Complications: None    Findings:      1. Esophagus: The squamocolumnar junction was noted at 37 cm and appeared regular. The diaphragmatic pinch was noted noted at 38 cm from the incisors. A 1 cm sliding hiatal hernia is present. The esophageal mucosa appeared normal. There was a slightly narrowed Schatzki's ring in the distal esophagus. Estimated diameter 16 mm. A balloon CRE was used and serially dilated to 18 mm. Biopsies taken from proximal esophagus.    2. Stomach: The stomach distended normally. Normal rugal folds were seen. The pylorus was patent. The gastric mucosa appeared normal s/p biopsies. Retroflexion revealed a normal fundus and a mildly-patulous cardia.     3. Duodenum: The duodenal mucosa appeared normal in the 1st and 2nd portion of the duodenum.      Impression:  1. Small hiatal hernia.  2. Schatzki's ring causing dysphagia, dilated to 18mm.    Recommend:  1. Await pathology.   2. Avoid caffeine, chocolate, peppermint, and alcohol. Also avoid lying down flat or in a recumbent position for 3 hours after a meal.   3. Start PPI daily.    >>>If tissue was sampled/removed and you have not received your pathology results either by phone or letter within 2 weeks, please call our office at [phone].    Specimens: gastric, esophageal  Blood loss: <1 ml

## 2024-09-17 NOTE — H&P
History & Physical Examination    Patient Name: Triny Head  MRN: [id number]  [facility]: [id number]  YOB: 1965    Diagnosis: dysphagia    Medications Prior to Admission   Medication Sig Dispense Refill Last Dose    ibuprofen (ADVIL) 200 MG Oral Tab Take 1 tablet (200 mg total) by mouth every 6 (six) hours as needed for Pain.   9/3/2024    Phentermine HCl 15 MG Oral Cap Take 1 capsule (15 mg total) by mouth every morning. 30 capsule 3 8/17/2024    atenolol 50 MG Oral Tab Take 1 tablet (50 mg total) by mouth daily. 90 tablet 3 9/17/2024    hydroCHLOROthiazide 25 MG Oral Tab Take 1 tablet (25 mg total) by mouth daily. 90 tablet 3 9/16/2024    Multiple Vitamin (MULTI-VITAMIN) Oral Tab Take 1 tablet by mouth daily.   9/10/2024    Aspirin 81 MG Oral Tab Take 1 tablet (81 mg total) by mouth daily.   8/17/2024    pantoprazole 40 MG Oral Tab EC Take 1 tablet (40 mg total) by mouth 2 (two) times daily before meals. 60 tablet 0     pantoprazole 40 MG Oral Tab EC Take 1 tablet (40 mg total) by mouth every morning before breakfast. 90 tablet 3 9/3/2024    cephalexin 500 MG Oral Cap Take 1 capsule (500 mg total) by mouth 2 (two) times daily. 14 capsule 0     acetaminophen-codeine 300-30 MG Oral Tab Take 2 tablets by mouth every 6 (six) hours as needed for Pain. 30 tablet 0 9/14/2024    losartan 50 MG Oral Tab Take 1 tablet (50 mg total) by mouth daily. 90 tablet 3 9/17/2024    KETOCONAZOLE 2 % External Shampoo APPLY TO SCALP 2 TIMES PER WEEK 120 mL 0     ketoconazole 2 % External Cream APPLY  CREAM TOPICALLY TO AFFECTED AREA TWICE DAILY 30 g 0  at PRN     Current Facility-Administered Medications   Medication Dose Route Frequency    lactated ringers infusion   Intravenous Continuous       Allergies:   Allergies   Allergen Reactions    Latex HIVES    Morphine Sulfate ITCHING       Past Medical History:    Back problem    Bronchitis    Cardiomyopathy (HCC)    \"enlarged heart\" per pt    Essential hypertension     Fibromyalgia    GERD (gastroesophageal reflux disease)    repeat EGD in  due to esophagitis    High blood pressure    Prediabetes    Problems with swallowing    Tubular adenoma of colon    repeat CLN in - sees Dr. Allen at [facility]     Past Surgical History:   Procedure Laterality Date          Colonoscopy N/A 2019    Procedure: COLONOSCOPY;  Surgeon: SARAH Pnia MD;  Location: [facility] ENDOSCOPY    Colonoscopy N/A 2020    Procedure: COLONOSCOPY;  Surgeon: SARAH Pina MD;  Location: [facility] ENDOSCOPY    Colonoscopy      Cyst aspiration right  2014    Hysterectomy      38    Desmond localization wire 1 site right (cpt=19281)      Needle biopsy right      in her 20's    Other surgical history  2024    cervical lymphadenopathy    Total abdom hysterectomy      15 yrs ago     Family History   Problem Relation Age of Onset    Other (Other) Mother     Heart Disorder Father     Prostate Cancer Maternal Grandfather 77    Breast Cancer Paternal Aunt         in her 50's     Social History     Tobacco Use    Smoking status: Former     Types: Cigarettes     Start date:     Smokeless tobacco: Former   Substance Use Topics    Alcohol use: Yes     Alcohol/week: 1.0 standard drink of alcohol     Types: 1 Cans of beer per week     Comment: Socially         SYSTEM Check if Review is Normal Check if Physical Exam is Normal If not normal, please explain:   HEENT [X ] [ X]    NECK  [X ] [ X]    HEART [X ] [ X]    LUNGS [X ] [ X]    ABDOMEN [X ] [ X]    EXTREMITIES [X ] [ X]    OTHER        I have discussed the risks and benefits and alternatives of the procedure with the patient/family.  They understand and agree to proceed with plan of care.   I have reviewed the History and Physical done within the last 30 days.  Any changes noted above.    ASIYA Pina MD  [facility] - Gastroenterology  2024  7:27 AM           48

## 2024-09-18 VITALS
BODY MASS INDEX: 38.64 KG/M2 | RESPIRATION RATE: 14 BRPM | HEART RATE: 72 BPM | SYSTOLIC BLOOD PRESSURE: 115 MMHG | HEIGHT: 62 IN | WEIGHT: 210 LBS | DIASTOLIC BLOOD PRESSURE: 73 MMHG | OXYGEN SATURATION: 99 %

## 2024-10-04 ENCOUNTER — TELEPHONE (OUTPATIENT)
Facility: CLINIC | Age: 59
End: 2024-10-04

## 2024-10-07 ENCOUNTER — OFFICE VISIT (OUTPATIENT)
Dept: INTERNAL MEDICINE CLINIC | Facility: CLINIC | Age: 59
End: 2024-10-07

## 2024-10-07 ENCOUNTER — LAB ENCOUNTER (OUTPATIENT)
Dept: LAB | Age: 59
End: 2024-10-07
Attending: INTERNAL MEDICINE
Payer: MEDICAID

## 2024-10-07 VITALS
DIASTOLIC BLOOD PRESSURE: 82 MMHG | WEIGHT: 216.63 LBS | HEART RATE: 56 BPM | BODY MASS INDEX: 39.86 KG/M2 | SYSTOLIC BLOOD PRESSURE: 138 MMHG | HEIGHT: 62 IN

## 2024-10-07 DIAGNOSIS — I10 PRIMARY HYPERTENSION: Primary | ICD-10-CM

## 2024-10-07 DIAGNOSIS — R73.03 PREDIABETES: ICD-10-CM

## 2024-10-07 DIAGNOSIS — R25.2 LEG CRAMPING: ICD-10-CM

## 2024-10-07 DIAGNOSIS — I10 PRIMARY HYPERTENSION: ICD-10-CM

## 2024-10-07 DIAGNOSIS — E66.09 CLASS 2 OBESITY DUE TO EXCESS CALORIES WITHOUT SERIOUS COMORBIDITY WITH BODY MASS INDEX (BMI) OF 39.0 TO 39.9 IN ADULT: ICD-10-CM

## 2024-10-07 DIAGNOSIS — E66.812 CLASS 2 OBESITY DUE TO EXCESS CALORIES WITHOUT SERIOUS COMORBIDITY WITH BODY MASS INDEX (BMI) OF 39.0 TO 39.9 IN ADULT: ICD-10-CM

## 2024-10-07 DIAGNOSIS — E55.9 VITAMIN D DEFICIENCY: ICD-10-CM

## 2024-10-07 LAB
ALBUMIN SERPL-MCNC: 4.6 G/DL (ref 3.2–4.8)
ALBUMIN/GLOB SERPL: 1.4 {RATIO} (ref 1–2)
ALP LIVER SERPL-CCNC: 126 U/L
ALT SERPL-CCNC: 15 U/L
ANION GAP SERPL CALC-SCNC: 6 MMOL/L (ref 0–18)
AST SERPL-CCNC: 17 U/L (ref ?–34)
BASOPHILS # BLD AUTO: 0.03 X10(3) UL (ref 0–0.2)
BASOPHILS NFR BLD AUTO: 0.6 %
BILIRUB SERPL-MCNC: 0.3 MG/DL (ref 0.2–1.1)
BUN BLD-MCNC: 25 MG/DL (ref 9–23)
BUN/CREAT SERPL: 17.7 (ref 10–20)
CALCIUM BLD-MCNC: 10.2 MG/DL (ref 8.7–10.4)
CHLORIDE SERPL-SCNC: 108 MMOL/L (ref 98–112)
CO2 SERPL-SCNC: 30 MMOL/L (ref 21–32)
CREAT BLD-MCNC: 1.41 MG/DL
DEPRECATED RDW RBC AUTO: 40.1 FL (ref 35.1–46.3)
EGFRCR SERPLBLD CKD-EPI 2021: 42 ML/MIN/1.73M2 (ref 60–?)
EOSINOPHIL # BLD AUTO: 0.02 X10(3) UL (ref 0–0.7)
EOSINOPHIL NFR BLD AUTO: 0.4 %
ERYTHROCYTE [DISTWIDTH] IN BLOOD BY AUTOMATED COUNT: 13.9 % (ref 11–15)
EST. AVERAGE GLUCOSE BLD GHB EST-MCNC: 131 MG/DL (ref 68–126)
FASTING STATUS PATIENT QL REPORTED: NO
GLOBULIN PLAS-MCNC: 3.3 G/DL (ref 2–3.5)
GLUCOSE BLD-MCNC: 87 MG/DL (ref 70–99)
HBA1C MFR BLD: 6.2 % (ref ?–5.7)
HCT VFR BLD AUTO: 43.3 %
HGB BLD-MCNC: 13.9 G/DL
IMM GRANULOCYTES # BLD AUTO: 0.02 X10(3) UL (ref 0–1)
IMM GRANULOCYTES NFR BLD: 0.4 %
LYMPHOCYTES # BLD AUTO: 1.64 X10(3) UL (ref 1–4)
LYMPHOCYTES NFR BLD AUTO: 30.1 %
MCH RBC QN AUTO: 25.4 PG (ref 26–34)
MCHC RBC AUTO-ENTMCNC: 32.1 G/DL (ref 31–37)
MCV RBC AUTO: 79 FL
MONOCYTES # BLD AUTO: 0.44 X10(3) UL (ref 0.1–1)
MONOCYTES NFR BLD AUTO: 8.1 %
NEUTROPHILS # BLD AUTO: 3.3 X10 (3) UL (ref 1.5–7.7)
NEUTROPHILS # BLD AUTO: 3.3 X10(3) UL (ref 1.5–7.7)
NEUTROPHILS NFR BLD AUTO: 60.4 %
OSMOLALITY SERPL CALC.SUM OF ELEC: 302 MOSM/KG (ref 275–295)
PLATELET # BLD AUTO: 228 10(3)UL (ref 150–450)
POTASSIUM SERPL-SCNC: 4 MMOL/L (ref 3.5–5.1)
PROT SERPL-MCNC: 7.9 G/DL (ref 5.7–8.2)
RBC # BLD AUTO: 5.48 X10(6)UL
SODIUM SERPL-SCNC: 144 MMOL/L (ref 136–145)
VIT D+METAB SERPL-MCNC: 40.7 NG/ML (ref 30–100)
WBC # BLD AUTO: 5.5 X10(3) UL (ref 4–11)

## 2024-10-07 PROCEDURE — 36415 COLL VENOUS BLD VENIPUNCTURE: CPT

## 2024-10-07 PROCEDURE — 80053 COMPREHEN METABOLIC PANEL: CPT

## 2024-10-07 PROCEDURE — 83036 HEMOGLOBIN GLYCOSYLATED A1C: CPT

## 2024-10-07 PROCEDURE — 99214 OFFICE O/P EST MOD 30 MIN: CPT | Performed by: INTERNAL MEDICINE

## 2024-10-07 PROCEDURE — 85025 COMPLETE CBC W/AUTO DIFF WBC: CPT

## 2024-10-07 PROCEDURE — 82306 VITAMIN D 25 HYDROXY: CPT

## 2024-10-07 RX ORDER — ATENOLOL 50 MG/1
50 TABLET ORAL DAILY
Qty: 90 TABLET | Refills: 3 | Status: SHIPPED | OUTPATIENT
Start: 2024-10-07

## 2024-10-07 RX ORDER — LOSARTAN POTASSIUM 50 MG/1
50 TABLET ORAL DAILY
Qty: 90 TABLET | Refills: 3 | Status: SHIPPED | OUTPATIENT
Start: 2024-10-07

## 2024-10-07 RX ORDER — HYDROCHLOROTHIAZIDE 25 MG/1
25 TABLET ORAL DAILY
Qty: 90 TABLET | Refills: 1 | Status: SHIPPED | OUTPATIENT
Start: 2024-10-07

## 2024-10-07 RX ORDER — HYDROCHLOROTHIAZIDE 25 MG/1
25 TABLET ORAL DAILY
Qty: 90 TABLET | Refills: 0 | OUTPATIENT
Start: 2024-10-07

## 2024-10-07 RX ORDER — TOPIRAMATE 25 MG/1
25 TABLET, FILM COATED ORAL 2 TIMES DAILY
Qty: 90 TABLET | Refills: 0 | Status: SHIPPED | OUTPATIENT
Start: 2024-10-07

## 2024-10-07 NOTE — TELEPHONE ENCOUNTER
Disp Refills Start End    hydroCHLOROthiazide 25 MG Oral Tab 90 tablet 1 10/7/2024 --    Sig - Route: Take 1 tablet (25 mg total) by mouth daily. - Oral    Sent to pharmacy as: hydroCHLOROthiazide 25 MG Oral Tablet    E-Prescribing Status: Receipt confirmed by pharmacy (10/7/2024 10:59 AM CDT)      Pharmacy    [facility] [address] [place] [address] [phone], [phone]

## 2024-10-08 NOTE — PROGRESS NOTES
Subjective:     Patient ID: Triny Head is a 59 year old female.  Patient presents with a concern of weight, follow-up on hypertension.    HPI  Patient states that she has been feeling well, she went to weight loss clinic where she was advised to try medication OsYmia to control appetite.  Patient does not follow strict diet, when we spoke about needing completely eliminate sugar, controlling carb intake, she was surprised seems.  She does not exercise but planning to start regular exercise program.  She takes blood pressure medication as prescribed, blood pressure seems controlled.  She concerned about diabetes, last hemoglobin A1c was in prediabetic range  History/Other:   Review of Systems  Current Outpatient Medications   Medication Sig Dispense Refill    topiramate 25 MG Oral Tab Take 1 tablet (25 mg total) by mouth 2 (two) times daily. 90 tablet 0    losartan 50 MG Oral Tab Take 1 tablet (50 mg total) by mouth daily. 90 tablet 3    hydroCHLOROthiazide 25 MG Oral Tab Take 1 tablet (25 mg total) by mouth daily. 90 tablet 1    atenolol 50 MG Oral Tab Take 1 tablet (50 mg total) by mouth daily. 90 tablet 3    pantoprazole 40 MG Oral Tab EC Take 1 tablet (40 mg total) by mouth every morning before breakfast. 90 tablet 3    ketoconazole 2 % External Cream APPLY  CREAM TOPICALLY TO AFFECTED AREA TWICE DAILY 30 g 0    Multiple Vitamin (MULTI-VITAMIN) Oral Tab Take 1 tablet by mouth daily.      Aspirin 81 MG Oral Tab Take 1 tablet (81 mg total) by mouth daily.       Allergies:  Allergies   Allergen Reactions    Latex HIVES    Morphine Sulfate ITCHING       Past Medical History:    Back problem    Bronchitis    Cardiomyopathy (HCC)    \"enlarged heart\" per pt    Essential hypertension    Fibromyalgia    GERD (gastroesophageal reflux disease)    repeat EGD in 2020 due to esophagitis    High blood pressure    Prediabetes    Problems with swallowing    Tubular adenoma of colon    repeat CLN in 2023- sees Dr. Allen at  [facility]      Past Surgical History:   Procedure Laterality Date          Colonoscopy N/A 2019    Procedure: COLONOSCOPY;  Surgeon: SARAH Pina MD;  Location: [facility] ENDOSCOPY    Colonoscopy N/A 2020    Procedure: COLONOSCOPY;  Surgeon: SARAH Pina MD;  Location: [facility] ENDOSCOPY    Colonoscopy      Cyst aspiration right      Hysterectomy      38    Desmond localization wire 1 site right (cpt=19281)      Needle biopsy right      in her 20's    Other surgical history  2024    cervical lymphadenopathy    Total abdom hysterectomy      15 yrs ago      Family History   Problem Relation Age of Onset    Other (Other) Mother     Heart Disorder Father     Prostate Cancer Maternal Grandfather 77    Breast Cancer Paternal Aunt         in her 50's      Social History:   Social History     Socioeconomic History    Marital status: Single   Tobacco Use    Smoking status: Former     Types: Cigarettes     Start date:     Smokeless tobacco: Former   Vaping Use    Vaping status: Never Used   Substance and Sexual Activity    Alcohol use: Yes     Alcohol/week: 1.0 standard drink of alcohol     Types: 1 Cans of beer per week     Comment: Socially    Drug use: No        Objective:   Physical Exam  Constitutional:       Appearance: Normal appearance. She is obese.   Eyes:      Extraocular Movements: Extraocular movements intact.      Pupils: Pupils are equal, round, and reactive to light.   Cardiovascular:      Rate and Rhythm: Normal rate and regular rhythm.      Heart sounds: No murmur heard.  Pulmonary:      Breath sounds: No wheezing or rhonchi.   Musculoskeletal:      Cervical back: Normal range of motion.   Lymphadenopathy:      Cervical: No cervical adenopathy.   Neurological:      General: No focal deficit present.      Mental Status: She is alert and oriented to person, place, and time. Mental status is at baseline.   Psychiatric:         Mood and Affect: Mood normal.         Behavior:  Behavior normal.         Thought Content: Thought content normal.         Judgment: Judgment normal.         Assessment & Plan:   1. Primary hypertension on current medication continue medication, advised to assure adequate fluid intake at least 64 ounces of liquids a day   2. Prediabetes we discussed at length need of following low carbohydrate diet, attempts to lose weight   3. Class 2 obesity due to excess of calories without serious comorbidity with body mass index (BMI) of 39.0 to 39.9 in adult Long discussion about importance of strict low carbohydrate sugar-free calorie restricted diet regular meals, regular exercise program.  Advised that overall medication helped to control appetite and nothing else, suggested medication may not be suited for patient with history of hypertension, advised that we may try topiramate 25 mg daily and consider metformin 500 mg daily.  Follow-up with weight loss physician Dr. Smith   4. Leg cramping chronic, stressed importance of staying hydrated, will check arterial Doppler ultrasound both lower extremities   5. Vitamin D deficiency check vitamin D level treat if necessary       Orders Placed This Encounter   Procedures    CBC With Differential With Platelet    Comp Metabolic Panel (14)    Hemoglobin A1C    Vitamin D [E]       Meds This Visit:  Requested Prescriptions     Signed Prescriptions Disp Refills    topiramate 25 MG Oral Tab 90 tablet 0     Sig: Take 1 tablet (25 mg total) by mouth 2 (two) times daily.    losartan 50 MG Oral Tab 90 tablet 3     Sig: Take 1 tablet (50 mg total) by mouth daily.    hydroCHLOROthiazide 25 MG Oral Tab 90 tablet 1     Sig: Take 1 tablet (25 mg total) by mouth daily.    atenolol 50 MG Oral Tab 90 tablet 3     Sig: Take 1 tablet (50 mg total) by mouth daily.       Imaging & Referrals:  US ART LOWER EXT BILAT DOPPLER W SEG PRESSURES (CPT=93923)   Follow-up in 3 months

## 2024-11-06 ENCOUNTER — OFFICE VISIT (OUTPATIENT)
Facility: LOCATION | Age: 59
End: 2024-11-06

## 2024-11-06 VITALS — BODY MASS INDEX: 37.21 KG/M2 | HEIGHT: 63 IN | WEIGHT: 210 LBS

## 2024-11-06 DIAGNOSIS — H60.8X3 CHRONIC ECZEMATOUS OTITIS EXTERNA OF BOTH EARS: Primary | ICD-10-CM

## 2024-11-06 DIAGNOSIS — R59.0 CERVICAL LYMPHADENOPATHY: ICD-10-CM

## 2024-11-06 PROCEDURE — 99213 OFFICE O/P EST LOW 20 MIN: CPT | Performed by: STUDENT IN AN ORGANIZED HEALTH CARE EDUCATION/TRAINING PROGRAM

## 2024-11-06 PROCEDURE — 92504 EAR MICROSCOPY EXAMINATION: CPT | Performed by: STUDENT IN AN ORGANIZED HEALTH CARE EDUCATION/TRAINING PROGRAM

## 2024-11-06 RX ORDER — FLUOCINOLONE ACETONIDE 0.11 MG/ML
3 OIL AURICULAR (OTIC) 2 TIMES DAILY
Qty: 20 ML | Refills: 5 | Status: SHIPPED | OUTPATIENT
Start: 2024-11-06 | End: 2024-11-20

## 2024-11-06 NOTE — PROGRESS NOTES
[place]  OTOLARYNGOLOGY - HEAD & NECK SURGERY    11/6/2024     Reason for Consultation:   Neck lump    History of Present Illness:   Patient is a pleasant 59 year old female who is being seen for 3 to 4 weeks of a lump that she noticed in her submental area.  The patient states that she has had recent episodes of laryngitis and was having some throat pain with swallowing.  She does also have a history of reflux and has an appointment with a gastroenterologist coming up.  She was then feeling her neck and noticed a lump underneath her chin.  She states it is not painful.  She is unsure if it is growing in size.  She does not have any history of consistent tobacco use, no family history of head neck cancer, she denies any night sweats, fevers, weight loss.  She is here because this finding has caused her to be anxious regarding this.    INTERVAL HISTORY  7/9/2024: The patient returns today with continuation of her submental lymph node.  She is unsure if it has gotten any larger but she says it is noticeable.  It does cause her discomfort when she moves around.  She has not had any fevers or night sweats.  No unintended weight loss.  She is hoping to have this removed and sent for pathology.  7/29/2024: Patient is 10 days post op from excision of submental lymph node  for biopsy which is favored to be reactive based on pathology and flow cytometry.  8/6/2024: Patient returns today because she was having some firmness around her incision.  She denies any pain around the incision.  She was just concerned that she had some fullness here.  11/6/2024: Patient returns today for follow-up on her submental incision and her fullness.  She states that she has been feeling well and has not felt any mass around there.  The firmness around her incision is now improved.  She does note however that she has been having some tenderness inside her ear canals and some itchiness.  She does use Q-tips.    Past Medical History  Past  Medical History:    Back problem    Bronchitis    Cardiomyopathy (HCC)    \"enlarged heart\" per pt    Essential hypertension    Fibromyalgia    GERD (gastroesophageal reflux disease)    repeat EGD in  due to esophagitis    High blood pressure    Prediabetes    Problems with swallowing    Tubular adenoma of colon    repeat CLN in - sees Dr. Allen at [facility]       Past Surgical History  Past Surgical History:   Procedure Laterality Date          Colonoscopy N/A 2019    Procedure: COLONOSCOPY;  Surgeon: SARAH Pina MD;  Location: [facility] ENDOSCOPY    Colonoscopy N/A 2020    Procedure: COLONOSCOPY;  Surgeon: SARAH Pina MD;  Location: [facility] ENDOSCOPY    Colonoscopy      Cyst aspiration right      Hysterectomy      38    Desmond localization wire 1 site right (cpt=19281)      Needle biopsy right      in her 20's    Other surgical history  2024    cervical lymphadenopathy    Total abdom hysterectomy      15 yrs ago       Family History  Family History   Problem Relation Age of Onset    Other (Other) Mother     Heart Disorder Father     Prostate Cancer Maternal Grandfather 77    Breast Cancer Paternal Aunt         in her 50's       Social History  Pediatric History   Patient Parents    Not on file     Other Topics Concern    Not on file   Social History Narrative    Not on file           Current Medications:  Current Outpatient Medications   Medication Sig Dispense Refill    topiramate 25 MG Oral Tab Take 1 tablet (25 mg total) by mouth 2 (two) times daily. 90 tablet 0    losartan 50 MG Oral Tab Take 1 tablet (50 mg total) by mouth daily. 90 tablet 3    hydroCHLOROthiazide 25 MG Oral Tab Take 1 tablet (25 mg total) by mouth daily. 90 tablet 1    atenolol 50 MG Oral Tab Take 1 tablet (50 mg total) by mouth daily. 90 tablet 3    pantoprazole 40 MG Oral Tab EC Take 1 tablet (40 mg total) by mouth every morning before breakfast. 90 tablet 3    ketoconazole 2 % External Cream APPLY   CREAM TOPICALLY TO AFFECTED AREA TWICE DAILY 30 g 0    Multiple Vitamin (MULTI-VITAMIN) Oral Tab Take 1 tablet by mouth daily.      Aspirin 81 MG Oral Tab Take 1 tablet (81 mg total) by mouth daily.         Allergies  Allergies   Allergen Reactions    Latex HIVES    Morphine Sulfate ITCHING       Review of Systems:   A comprehensive 10 point review of systems was completed.  Pertinent positives and negatives noted in the the HPI.    Physical Exam:   Height 5' 3\" (1.6 m), weight 210 lb (95.3 kg).    GENERAL: No acute distress, Comfortable appearing  FACE: HB 1/6, Normal Animation  HEAD: Normocephalic  EYES: EOMI, pupils equil  EARS: Bilateral Auricles Symmetric, bilateral tympanic membranes normal  NOSE: Nares patent bilaterally  ORAL CAVITY: Patient deferred oral cavity exam today  NECK: Submental incision is fully healed, soft    Canals:  Right: Some flakiness and dryness of the lateral canal, otherwise unremarkable  Left: Some flakiness and dryness of the lateral canal, otherwise unremarkable    Tympanic Membranes:  Right: Normal tympanic membrane, with no retraction, middle ear space clear  Left: Normal tympanic membrane. with no retraction middle ear space clear    TM Visualized Method:   Right TM examined via otomicroscopy.   Left TM examined via otomicroscopy.      Results:     Laboratory Data:  Lab Results   Component Value Date    WBC 5.5 10/07/2024    HGB 13.9 10/07/2024    HCT 43.3 10/07/2024    .0 10/07/2024    CREATSERUM 1.41 (H) 10/07/2024    BUN 25 (H) 10/07/2024     10/07/2024    K 4.0 10/07/2024     10/07/2024    CO2 30.0 10/07/2024    GLU 87 10/07/2024    CA 10.2 10/07/2024    ALB 4.6 10/07/2024    ALKPHO 126 10/07/2024    TP 7.9 10/07/2024    AST 17 10/07/2024    ALT 15 10/07/2024    PTT 24.0 07/11/2024    INR 0.96 07/11/2024    PTP 13.4 07/11/2024    T4F 1.2 08/09/2023    TSH 1.516 04/01/2024    LIP 64 (H) 01/17/2019    ESRML 26 05/20/2024    CRP 2.00 (H) 05/20/2024    MG 2.2  03/03/2023    TROP <0.045 05/07/2021     08/15/2022    B12 1,575 (H) 04/01/2024         Imaging:  MRI BRAIN (CPT=70551)    Result Date: 5/3/2024  PROCEDURE: MRI BRAIN (CPT=70551)  COMPARISON: None.  INDICATIONS: G31.84 MCI (mild cognitive impairment) with memory loss  TECHNIQUE: A variety of imaging planes and parameters were utilized for visualization of suspected pathology.  Images were performed without contrast.  FINDINGS:  CEREBRUM: Scattered T2/FLAIR signal hyperintense foci within the superficial and deep cerebral white matter without mass effect or diffusion restriction.  There is otherwise no intra-axial mass, mass effect, or hemorrhage.  No abnormal diffusion restriction to suggest acute or subacute infarct. CEREBELLUM: No edema, hemorrhage, mass, acute infarction, or inappropriate atrophy.  BRAINSTEM: No edema, hemorrhage, mass, acute infarction, or inappropriate atrophy.  CSF SPACES: Ventricles, cisterns, and sulci are appropriate for age.  No hydrocephalus, subarachnoid hemorrhage, or mass.  SKULL: No mass or other significant visible lesion.  SINUSES: Limited views demonstrate no significant mucosal thickening or fluid.  ORBITS: Limited views are unremarkable.  OTHER: Negative.          CONCLUSION: No acute intracranial process.  Scattered T2/FLAIR signal changes within the cerebral white matter which may represent sequela of prior inflammation, prior infection, or microvascular white matter ischemic change related to longstanding hypertension and/or diabetes.    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  No acute lymphadenitis, granulomas, necrosis, or malignancy identified (see comment).     Comment:  Sections demonstrate preservation of the lymph node architecture with  expansion of the paracortical regions comprised of mature polymorphous lymphocytes.  Focal areas of reactive sinus histiocytosis are also seen.  No acute inflammatory infiltrates, granulomas, necrosis, or primary or metastatic malignant neoplastic infiltrates are identified.       Immunophenotyping by flow cytometry demonstrates no evidence of a monotypic B cell population or T cell aberrancy by markers assayed.      Overall, the morphologic and immunophenotypic findings are consistent with paracortical reactive lymphoid cell hyperplasia.   Clinically, paracortical lymphoid cell hyperplasias are commonly seen in association with viral infections, are occasionally iatrogenically induced hypersensitivity reactions (especially to medications), or may follow vaccinations.       Recommend clinical and radiologic correlation, as well as correlation with pending cultures.     For  purposes, this case was reviewed by a second pathologist who concurred with the diagnosis.      Electronically signed by Parmjit Massey MD on 7/22/2024 at 10:37 AM         Impression:   Cervical lymphadenopathy, reactive  Odynophagia  GERD    Recommendations:  The patient's incision appears to be healing well.  There is no longer any palpable lymphadenopathy here.  In terms of her ears I would like her to use Derm otic eardrops over the next 2 weeks and then after that she will use them as needed.  She will avoid Q-tips.  She will return to see me as needed.    Thank you for allowing me to participate in the care of your patient.    Jose Wetzel,    Otolaryngology/Rhinology, Sinus, and Endoscopic Skull Base Surgery  [facility]   [address] [place] [address] [address] [place] [id number]  Phone [phone]  Fax [phone]  5/28/2024  9:32 AM  11/6/2024

## 2024-12-30 ENCOUNTER — NURSE TRIAGE (OUTPATIENT)
Dept: INTERNAL MEDICINE CLINIC | Facility: CLINIC | Age: 59
End: 2024-12-30

## 2024-12-31 NOTE — TELEPHONE ENCOUNTER
Please reply to pool: EM RN TRIAGE  Action Requested: Summary for Provider     []  Critical Lab, Recommendations Needed  [] Need Additional Advice  [x]   FYI    []   Need Orders  [] Need Medications Sent to Pharmacy  []  Other     SUMMARY: Patient contacted regarding appointment scheduled for headache and right hand tingling. Headache has been present for weeks and comes and goes.  Left side of her head.  Occasional nausea. Denies blurred vision or dizziness. Denies facial weakness or tingling. Rates 3/10.  Right hand has felt tingly for the last several weeks, worsening as of late.  Denies numbness or pain elsewhere in the arm.  Hand is mildly swollen.  Complains of occasional \"catch\" in the left side of her chest.  Associated shoulder pain.  Denies shortness of breath, dizziness or lightheadedness. Not present currently.  Feels similar to GERD symptoms.  Nurse advised close monitoring of these symptoms, report immediately to emergency room if they occur again or worsen.  She verbalized understanding and compliance.  Acute visit scheduled 01/02.    Reason for call: Headache and Hand Pain  Onset: Data Unavailable                       Reason for Disposition   New headache and age > 50   Unexplained headache that is present > 24 hours   Weakness or numbness in hand or fingers and present > 2 weeks   Chest pain(s) lasting a few seconds    Protocols used: Headache-A-OH, Hand and Wrist Pain-A-OH, Chest Pain-A-OH

## 2024-12-31 NOTE — TELEPHONE ENCOUNTER
Patient scheduled appointment for the following    headache and numbness and tingling in right hand     Message sent

## 2025-01-02 ENCOUNTER — OFFICE VISIT (OUTPATIENT)
Dept: INTERNAL MEDICINE CLINIC | Facility: CLINIC | Age: 60
End: 2025-01-02
Payer: MEDICAID

## 2025-01-02 ENCOUNTER — LAB ENCOUNTER (OUTPATIENT)
Dept: LAB | Age: 60
End: 2025-01-02
Attending: INTERNAL MEDICINE
Payer: MEDICAID

## 2025-01-02 ENCOUNTER — HOSPITAL ENCOUNTER (OUTPATIENT)
Dept: GENERAL RADIOLOGY | Age: 60
Discharge: HOME | End: 2025-01-02
Attending: NURSE PRACTITIONER
Payer: MEDICAID

## 2025-01-02 VITALS
SYSTOLIC BLOOD PRESSURE: 128 MMHG | TEMPERATURE: 98 F | BODY MASS INDEX: 37 KG/M2 | WEIGHT: 210 LBS | HEART RATE: 60 BPM | RESPIRATION RATE: 16 BRPM | DIASTOLIC BLOOD PRESSURE: 70 MMHG | OXYGEN SATURATION: 97 %

## 2025-01-02 DIAGNOSIS — M25.512 CHRONIC LEFT SHOULDER PAIN: ICD-10-CM

## 2025-01-02 DIAGNOSIS — G89.29 CHRONIC LEFT SHOULDER PAIN: ICD-10-CM

## 2025-01-02 DIAGNOSIS — R20.0 BILATERAL HAND NUMBNESS: Primary | ICD-10-CM

## 2025-01-02 DIAGNOSIS — R89.9 ABNORMAL LABORATORY TEST RESULT: ICD-10-CM

## 2025-01-02 PROBLEM — Q43.8 TORTUOUS COLON: Status: ACTIVE | Noted: 2023-05-24

## 2025-01-02 PROBLEM — K92.1 BLOOD IN STOOL: Status: ACTIVE | Noted: 2023-05-24

## 2025-01-02 PROBLEM — G56.01 RIGHT CARPAL TUNNEL SYNDROME: Status: ACTIVE | Noted: 2023-01-05

## 2025-01-02 PROBLEM — R93.89 ABNORMAL FINDINGS ON IMAGING TEST: Status: ACTIVE | Noted: 2020-10-27

## 2025-01-02 LAB
ANION GAP SERPL CALC-SCNC: 6 MMOL/L (ref 0–18)
BUN BLD-MCNC: 21 MG/DL (ref 9–23)
BUN/CREAT SERPL: 17.8 (ref 10–20)
CALCIUM BLD-MCNC: 10 MG/DL (ref 8.7–10.4)
CHLORIDE SERPL-SCNC: 106 MMOL/L (ref 98–112)
CO2 SERPL-SCNC: 32 MMOL/L (ref 21–32)
CREAT BLD-MCNC: 1.18 MG/DL
EGFRCR SERPLBLD CKD-EPI 2021: 53 ML/MIN/1.73M2 (ref 60–?)
FASTING STATUS PATIENT QL REPORTED: NO
GLUCOSE BLD-MCNC: 96 MG/DL (ref 70–99)
OSMOLALITY SERPL CALC.SUM OF ELEC: 301 MOSM/KG (ref 275–295)
POTASSIUM SERPL-SCNC: 3.7 MMOL/L (ref 3.5–5.1)
SODIUM SERPL-SCNC: 144 MMOL/L (ref 136–145)

## 2025-01-02 PROCEDURE — 73030 X-RAY EXAM OF SHOULDER: CPT | Performed by: NURSE PRACTITIONER

## 2025-01-02 PROCEDURE — 36415 COLL VENOUS BLD VENIPUNCTURE: CPT

## 2025-01-02 PROCEDURE — 99213 OFFICE O/P EST LOW 20 MIN: CPT | Performed by: NURSE PRACTITIONER

## 2025-01-02 PROCEDURE — 80048 BASIC METABOLIC PNL TOTAL CA: CPT

## 2025-01-02 NOTE — PROGRESS NOTES
Triny Head is a 59 year old female.  HPI:   Pt reports for the past year pain in her left arm/shoulder and bilateral hand numbness. Pain in left shoulder present one year, hx of fibromyalgia, no prior injury reported. She reports pain started anteriorly but now also has pain behind the shoulder blade, also present one year, intermittent. Pain sometimes radiates up towards neck. She reports numbness in right hand has worsened and feels difficulty holding things in her hand. Denies any swelling or redness. Saw Hand surgeon but was told she is not yet candidate for surgery and was advised conservative tx. She feels it is not helping. She denies any new complaints, denies any CP, SOB, HA, dizziness, vision changes, palpitations, swelling.   Current Outpatient Medications   Medication Sig Dispense Refill    topiramate 25 MG Oral Tab Take 1 tablet (25 mg total) by mouth 2 (two) times daily. 90 tablet 0    losartan 50 MG Oral Tab Take 1 tablet (50 mg total) by mouth daily. 90 tablet 3    hydroCHLOROthiazide 25 MG Oral Tab Take 1 tablet (25 mg total) by mouth daily. 90 tablet 1    atenolol 50 MG Oral Tab Take 1 tablet (50 mg total) by mouth daily. 90 tablet 3    pantoprazole 40 MG Oral Tab EC Take 1 tablet (40 mg total) by mouth every morning before breakfast. 90 tablet 3    ketoconazole 2 % External Cream APPLY  CREAM TOPICALLY TO AFFECTED AREA TWICE DAILY 30 g 0    Multiple Vitamin (MULTI-VITAMIN) Oral Tab Take 1 tablet by mouth daily.      Aspirin 81 MG Oral Tab Take 1 tablet (81 mg total) by mouth daily.        Past Medical History:    Back problem    Bronchitis    Cardiomyopathy (HCC)    \"enlarged heart\" per pt    Essential hypertension    Fibromyalgia    GERD (gastroesophageal reflux disease)    repeat EGD in 2020 due to esophagitis    High blood pressure    Prediabetes    Problems with swallowing    Tubular adenoma of colon    repeat CLN in 2023- sees Dr. Allen at [facility]      Social History:  Social  History     Socioeconomic History    Marital status: Single   Tobacco Use    Smoking status: Former     Types: Cigarettes     Start date: 1979    Smokeless tobacco: Former   Vaping Use    Vaping status: Never Used   Substance and Sexual Activity    Alcohol use: Yes     Alcohol/week: 1.0 standard drink of alcohol     Types: 1 Cans of beer per week     Comment: Socially    Drug use: No        REVIEW OF SYSTEMS:   Review of Systems   All other systems reviewed and are negative.         EXAM:   /70 (BP Location: Left arm, Patient Position: Sitting, Cuff Size: large)   Pulse 60   Temp 98.2 °F (36.8 °C)   Resp 16   Wt 210 lb (95.3 kg)   SpO2 97%   BMI 37.20 kg/m²     Physical Exam  Constitutional:       General: She is not in acute distress.  HENT:      Head: Normocephalic.   Eyes:      General: No scleral icterus.     Conjunctiva/sclera: Conjunctivae normal.   Cardiovascular:      Rate and Rhythm: Regular rhythm.      Pulses: Normal pulses.      Heart sounds: Normal heart sounds. No murmur heard.  Pulmonary:      Effort: Pulmonary effort is normal.      Breath sounds: Normal breath sounds.   Musculoskeletal:      Right shoulder: Normal.      Left shoulder: Tenderness and bony tenderness present. No swelling, deformity, effusion or crepitus. Normal range of motion. Normal strength. Normal pulse.      Right hand: Normal.      Left hand: Normal.      Cervical back: Normal range of motion and neck supple.   Skin:     General: Skin is warm.      Coloration: Skin is not jaundiced.      Findings: No bruising or erythema.   Neurological:      General: No focal deficit present.      Mental Status: She is alert and oriented to person, place, and time.      Cranial Nerves: No cranial nerve deficit.      Sensory: No sensory deficit.      Motor: No weakness.   Psychiatric:         Mood and Affect: Mood normal.         Behavior: Behavior normal.         Thought Content: Thought content normal.         Judgment: Judgment  normal.            ASSESSMENT AND PLAN:   1. Bilateral hand numbness  -Advising to return to hand surgeon for further evaluation   - Hand & Microvascular Surgery Referral - [facility] (Dr. Walters)    2. Chronic left shoulder pain  -Referral to physiatry.   -Tylenol PRN, warm compress. Consider PT referral pending physiatry evaluation.   - PHYSIATRY - INTERNAL  - XR SHOULDER, COMPLETE (MIN 2 VIEWS), LEFT (CPT=73030); Future       The patient indicates understanding of these issues and agrees to the plan.  The patient is asked to return in PRN/schedule annual exam asap..     The above note was creating using Dragon speech recognition technology. Please excuse any typos.

## 2025-01-22 ENCOUNTER — LAB ENCOUNTER (OUTPATIENT)
Dept: LAB | Age: 60
End: 2025-01-22
Attending: INTERNAL MEDICINE
Payer: MEDICAID

## 2025-01-22 ENCOUNTER — OFFICE VISIT (OUTPATIENT)
Dept: INTERNAL MEDICINE CLINIC | Facility: CLINIC | Age: 60
End: 2025-01-22
Payer: MEDICAID

## 2025-01-22 ENCOUNTER — HOSPITAL ENCOUNTER (OUTPATIENT)
Dept: GENERAL RADIOLOGY | Age: 60
Discharge: HOME | End: 2025-01-22
Attending: INTERNAL MEDICINE
Payer: MEDICAID

## 2025-01-22 VITALS
SYSTOLIC BLOOD PRESSURE: 135 MMHG | WEIGHT: 214.88 LBS | HEART RATE: 51 BPM | BODY MASS INDEX: 38.07 KG/M2 | HEIGHT: 63 IN | DIASTOLIC BLOOD PRESSURE: 85 MMHG

## 2025-01-22 DIAGNOSIS — R20.2 NUMBNESS AND TINGLING IN BOTH HANDS: ICD-10-CM

## 2025-01-22 DIAGNOSIS — R20.0 NUMBNESS AND TINGLING IN BOTH HANDS: ICD-10-CM

## 2025-01-22 DIAGNOSIS — R07.89 OTHER CHEST PAIN: ICD-10-CM

## 2025-01-22 DIAGNOSIS — L98.9 LESION OF SKIN OF NOSE: ICD-10-CM

## 2025-01-22 DIAGNOSIS — R73.03 PREDIABETES: ICD-10-CM

## 2025-01-22 DIAGNOSIS — I10 PRIMARY HYPERTENSION: ICD-10-CM

## 2025-01-22 DIAGNOSIS — G56.03 BILATERAL CARPAL TUNNEL SYNDROME: ICD-10-CM

## 2025-01-22 DIAGNOSIS — R20.2 NUMBNESS AND TINGLING IN BOTH HANDS: Primary | ICD-10-CM

## 2025-01-22 DIAGNOSIS — R20.0 NUMBNESS AND TINGLING IN BOTH HANDS: Primary | ICD-10-CM

## 2025-01-22 DIAGNOSIS — Z01.419 ENCOUNTER FOR GYNECOLOGICAL EXAMINATION WITH PAPANICOLAOU SMEAR OF CERVIX: ICD-10-CM

## 2025-01-22 LAB
ALBUMIN SERPL-MCNC: 4.5 G/DL (ref 3.2–4.8)
ALBUMIN/GLOB SERPL: 1.4 {RATIO} (ref 1–2)
ALP LIVER SERPL-CCNC: 120 U/L
ALT SERPL-CCNC: 18 U/L
ANION GAP SERPL CALC-SCNC: 9 MMOL/L (ref 0–18)
AST SERPL-CCNC: 16 U/L (ref ?–34)
BASOPHILS # BLD AUTO: 0.03 X10(3) UL (ref 0–0.2)
BASOPHILS NFR BLD AUTO: 0.5 %
BILIRUB SERPL-MCNC: 0.3 MG/DL (ref 0.2–1.1)
BUN BLD-MCNC: 24 MG/DL (ref 9–23)
BUN/CREAT SERPL: 20.3 (ref 10–20)
CALCIUM BLD-MCNC: 10 MG/DL (ref 8.7–10.4)
CHLORIDE SERPL-SCNC: 104 MMOL/L (ref 98–112)
CO2 SERPL-SCNC: 29 MMOL/L (ref 21–32)
CREAT BLD-MCNC: 1.18 MG/DL
DEPRECATED RDW RBC AUTO: 39.9 FL (ref 35.1–46.3)
EGFRCR SERPLBLD CKD-EPI 2021: 53 ML/MIN/1.73M2 (ref 60–?)
EOSINOPHIL # BLD AUTO: 0.03 X10(3) UL (ref 0–0.7)
EOSINOPHIL NFR BLD AUTO: 0.5 %
ERYTHROCYTE [DISTWIDTH] IN BLOOD BY AUTOMATED COUNT: 13.7 % (ref 11–15)
EST. AVERAGE GLUCOSE BLD GHB EST-MCNC: 134 MG/DL (ref 68–126)
FASTING STATUS PATIENT QL REPORTED: NO
GLOBULIN PLAS-MCNC: 3.2 G/DL (ref 2–3.5)
GLUCOSE BLD-MCNC: 115 MG/DL (ref 70–99)
HBA1C MFR BLD: 6.3 % (ref ?–5.7)
HCT VFR BLD AUTO: 44 %
HGB BLD-MCNC: 14.2 G/DL
IMM GRANULOCYTES # BLD AUTO: 0.01 X10(3) UL (ref 0–1)
IMM GRANULOCYTES NFR BLD: 0.2 %
LYMPHOCYTES # BLD AUTO: 1.91 X10(3) UL (ref 1–4)
LYMPHOCYTES NFR BLD AUTO: 31.6 %
MCH RBC QN AUTO: 25.8 PG (ref 26–34)
MCHC RBC AUTO-ENTMCNC: 32.3 G/DL (ref 31–37)
MCV RBC AUTO: 80 FL
MONOCYTES # BLD AUTO: 0.45 X10(3) UL (ref 0.1–1)
MONOCYTES NFR BLD AUTO: 7.5 %
NEUTROPHILS # BLD AUTO: 3.61 X10 (3) UL (ref 1.5–7.7)
NEUTROPHILS # BLD AUTO: 3.61 X10(3) UL (ref 1.5–7.7)
NEUTROPHILS NFR BLD AUTO: 59.7 %
OSMOLALITY SERPL CALC.SUM OF ELEC: 299 MOSM/KG (ref 275–295)
PLATELET # BLD AUTO: 249 10(3)UL (ref 150–450)
POTASSIUM SERPL-SCNC: 3.7 MMOL/L (ref 3.5–5.1)
PROT SERPL-MCNC: 7.7 G/DL (ref 5.7–8.2)
RBC # BLD AUTO: 5.5 X10(6)UL
SODIUM SERPL-SCNC: 142 MMOL/L (ref 136–145)
VIT B12 SERPL-MCNC: >2000 PG/ML (ref 211–911)
WBC # BLD AUTO: 6 X10(3) UL (ref 4–11)

## 2025-01-22 PROCEDURE — 80053 COMPREHEN METABOLIC PANEL: CPT

## 2025-01-22 PROCEDURE — 83036 HEMOGLOBIN GLYCOSYLATED A1C: CPT

## 2025-01-22 PROCEDURE — 85025 COMPLETE CBC W/AUTO DIFF WBC: CPT

## 2025-01-22 PROCEDURE — 36415 COLL VENOUS BLD VENIPUNCTURE: CPT

## 2025-01-22 PROCEDURE — 82607 VITAMIN B-12: CPT

## 2025-01-22 PROCEDURE — 99214 OFFICE O/P EST MOD 30 MIN: CPT | Performed by: INTERNAL MEDICINE

## 2025-01-22 PROCEDURE — 71046 X-RAY EXAM CHEST 2 VIEWS: CPT | Performed by: INTERNAL MEDICINE

## 2025-01-25 NOTE — PROGRESS NOTES
Subjective:     Patient ID: Triny Head is a 59 year old female presents to address several concerns    HPI  Patient reports that numbness tingling in both hands worsening, right hand worse than left, she has the symptoms for several years now rapidly progressing.  She underwent EMG test several years ago which showed mild carpal tunnel syndrome at that time.  She states that she drops things from the right hand, denies dexterity problem at this point  Continues to experience neck pain and headaches seems worse lately.  She does not check blood pressure at home, takes medications as prescribed.  Also started to experience left-sided chest pain presents as a pressure seems for no reason the last for few minutes and goes away.  She is concerned about blood sugar, trying to follow low carbohydrate diet but not losing weight.  Last hemoglobin A1c was6.2.  History/Other:   Review of Systems  Current Outpatient Medications   Medication Sig Dispense Refill    topiramate 25 MG Oral Tab Take 1 tablet (25 mg total) by mouth 2 (two) times daily. 90 tablet 0    losartan 50 MG Oral Tab Take 1 tablet (50 mg total) by mouth daily. 90 tablet 3    hydroCHLOROthiazide 25 MG Oral Tab Take 1 tablet (25 mg total) by mouth daily. 90 tablet 1    atenolol 50 MG Oral Tab Take 1 tablet (50 mg total) by mouth daily. 90 tablet 3    pantoprazole 40 MG Oral Tab EC Take 1 tablet (40 mg total) by mouth every morning before breakfast. 90 tablet 3    ketoconazole 2 % External Cream APPLY  CREAM TOPICALLY TO AFFECTED AREA TWICE DAILY 30 g 0    Multiple Vitamin (MULTI-VITAMIN) Oral Tab Take 1 tablet by mouth daily.      Aspirin 81 MG Oral Tab Take 1 tablet (81 mg total) by mouth daily.       Allergies:Allergies[1]    Past Medical History:    Back problem    Bronchitis    Cardiomyopathy (HCC)    \"enlarged heart\" per pt    Essential hypertension    Fibromyalgia    GERD (gastroesophageal reflux disease)    repeat EGD in 2020 due to esophagitis     High blood pressure    Prediabetes    Problems with swallowing    Tubular adenoma of colon    repeat CLN in - sees Dr. Allen at [facility]      Past Surgical History:   Procedure Laterality Date          Colonoscopy N/A 2019    Procedure: COLONOSCOPY;  Surgeon: SARAH Pina MD;  Location: [facility] ENDOSCOPY    Colonoscopy N/A 2020    Procedure: COLONOSCOPY;  Surgeon: SARAH Pina MD;  Location: [facility] ENDOSCOPY    Colonoscopy      Cyst aspiration right  2014    Hysterectomy      38    Desmond localization wire 1 site right (cpt=19281)      Needle biopsy right      in her 20's    Other surgical history  2024    cervical lymphadenopathy    Total abdom hysterectomy      15 yrs ago      Family History   Problem Relation Age of Onset    Other (Other) Mother     Heart Disorder Father     Prostate Cancer Maternal Grandfather 77    Breast Cancer Paternal Aunt         in her 50's      Social History:   Social History     Socioeconomic History    Marital status: Single   Tobacco Use    Smoking status: Former     Types: Cigarettes     Start date:      Passive exposure: Past    Smokeless tobacco: Former   Vaping Use    Vaping status: Never Used   Substance and Sexual Activity    Alcohol use: Yes     Alcohol/week: 1.0 standard drink of alcohol     Types: 1 Cans of beer per week     Comment: Socially    Drug use: No        /85 (BP Location: Left arm, Patient Position: Sitting, Cuff Size: adult)   Pulse 51   Ht 5' 3\" (1.6 m)   Wt 214 lb 14.4 oz (97.5 kg)   BMI 38.07 kg/m²    Physical Exam  Constitutional:       Appearance: Normal appearance. She is obese.   Eyes:      General: No scleral icterus.     Extraocular Movements: Extraocular movements intact.      Conjunctiva/sclera: Conjunctivae normal.      Pupils: Pupils are equal, round, and reactive to light.   Cardiovascular:      Rate and Rhythm: Normal rate and regular rhythm.      Heart sounds: No murmur heard.  Pulmonary:       Effort: Pulmonary effort is normal.      Breath sounds: No wheezing or rhonchi.   Abdominal:      General: Bowel sounds are normal.      Palpations: Abdomen is soft.   Musculoskeletal:         General: Normal range of motion.      Cervical back: Normal range of motion and neck supple.      Right lower leg: No edema.      Left lower leg: No edema.   Skin:     General: Skin is warm.      Coloration: Skin is not jaundiced.   Neurological:      General: No focal deficit present.      Mental Status: She is alert and oriented to person, place, and time. Mental status is at baseline.      Sensory: No sensory deficit.      Motor: No weakness.      Deep Tendon Reflexes: Reflexes normal.         Assessment & Plan:   1. Numbness and tingling in both hands most likely progressive carpal tunnel syndrome, ordered EMG and see neurologist, also address headaches with neurology MRI of the brain in May 2024 did not show acute intracranial abnormality changes nonspecific   2. Primary hypertension controlled on current medication will check CMP   3. Prediabetes check hemoglobin A1c   4. Bilateral carpal tunnel syndrome EMG test ordered discussed with patient natural history of disease, reasons for surgical intervention   5. Lesion of skin of nose see dermatology   6. Encounter for gynecological examination with Papanicolaou smear of cervix see gynecology   7. Other chest pain exercise thallium stress test ordered       Orders Placed This Encounter   Procedures    CBC With Differential With Platelet    Comp Metabolic Panel (14)    Hemoglobin A1C    Vitamin B12       Meds This Visit:  Requested Prescriptions      No prescriptions requested or ordered in this encounter     Follow-up after all testing completed  Imaging & Referrals:  NEURO - INTERNAL  ORTHOPEDIC - INTERNAL  DERM - INTERNAL  OBG - INTERNAL  CARD NUC EXERCISE STRESS (REST/EXER) (CPT 52784)          [1]   Allergies  Allergen Reactions    Latex HIVES    Morphine Sulfate  ITCHING

## 2025-01-30 ENCOUNTER — TELEPHONE (OUTPATIENT)
Dept: INTERNAL MEDICINE CLINIC | Facility: CLINIC | Age: 60
End: 2025-01-30

## 2025-01-30 NOTE — TELEPHONE ENCOUNTER
Patient called to get her test results. Advised patient of Dr. Danielle's notes. Patient verbalized understanding. Patient stated that she is still having pain in her upper back. Patient starts under the arm and radiates to the upper back. States it is a burning feeling. No rashes. Does not have numbness or tingling while having the back pain. Pain level currently is 6/10. Taking aleve which does help with the pain. No chest pain or shortness of breath. No other symptoms. Wanted to know what to do next about her upper back and what she can do about her kidney function? Please advise.        Clear lungs no acute abnormality   Written by Raquel Danielle MD on 1/22/2025  7:07 PM CST     Stable diabetic test hemoglobin A1c 6.3 it is prediabetic range.  Stable slightly elevated creatinine slightly abnormal kidney function test, normal liver test, you do not have anemia, vitamin B12 level   Written by Raquel Danielle MD on 1/22/2025  7:08 PM CST

## 2025-01-31 NOTE — TELEPHONE ENCOUNTER
Spoke to patient, she reports pain in the left side of her chest on the back when moves left arm.  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  Advised her to sit straight in the car, do exercises she learned during physical therapy in the past.  Will prescribe muscle relaxant Robaxin advised medication can cause sedation take only at night on weekends when she is not behind the wheel.  Patient has elevated creatinine for some time, takes hydrochlorothiazide, advised that at some point we can try to decrease hydrochlorothiazide again we tried in the past did not work blood pressure went up.  Referred patient to see nephrologist she saw Dr. Leesa Valenzuela 2 years ago placed referral.  Advised patient to avoid nonsteroidal anti-inflammatory inflammatory medication like Aleve, Motrin ibuprofen, she states that she has been taking this medication on regular basis for different aches and pains.  Blood test showed hemoglobin A1c 6.3 advised patient to follow low carbohydrate diet avoid excessive amount of pasta, bread, rice potatoes.  Eliminate plain sugars from the diet will monitor periodically.  Advised that regular physical activities also help to prevent progression to diabetes.

## 2025-02-11 ENCOUNTER — OFFICE VISIT (OUTPATIENT)
Dept: OBGYN CLINIC | Facility: CLINIC | Age: 60
End: 2025-02-11
Payer: MEDICAID

## 2025-02-11 VITALS
DIASTOLIC BLOOD PRESSURE: 78 MMHG | HEART RATE: 73 BPM | BODY MASS INDEX: 37.89 KG/M2 | HEIGHT: 63 IN | WEIGHT: 213.88 LBS | SYSTOLIC BLOOD PRESSURE: 148 MMHG

## 2025-02-11 DIAGNOSIS — N89.8 VAGINAL DISCHARGE: ICD-10-CM

## 2025-02-11 DIAGNOSIS — Z12.31 BREAST CANCER SCREENING BY MAMMOGRAM: ICD-10-CM

## 2025-02-11 DIAGNOSIS — Z12.4 PAPANICOLAOU SMEAR FOR CERVICAL CANCER SCREENING: ICD-10-CM

## 2025-02-11 DIAGNOSIS — Z01.419 WOMEN'S ANNUAL ROUTINE GYNECOLOGICAL EXAMINATION: Primary | ICD-10-CM

## 2025-02-11 PROCEDURE — 99396 PREV VISIT EST AGE 40-64: CPT | Performed by: ADVANCED PRACTICE MIDWIFE

## 2025-02-11 NOTE — PROGRESS NOTES
Triny Head is a 59 year old female.    HPI:       Triny Head is a 59 year old female.   No LMP recorded. Patient has had a hysterectomy.    Chief Complaint   Patient presents with    Annual     Pt is here for an annual exam c/o slight odor        Reports discharge with odor for about a month.      Hx Prior Abnormal Pap: Yes  Pap Date: 21    Menarche: 9-10  Period Cycle (Days): hysterectomy  Use of Birth Control (if yes, specify type): Hysterectomy  Hx Prior Abnormal Pap: Yes  Pap Date: 21        Declines STD testing. Has not been sexually active since lat 30's    Last pap: 2021, NIL. Hx of abnormal pap in 20's. Desires to do pap today as is here.   Hx of abnormal pap in 20's. Had hysterectomy in 40's due to fibroids and ovarian cyst. Took both ovaries, but left cervix.   Normal mammogram 2024  Had colonoscopy 2023 @ [facility]. Saw GI 2024.     Smoker: no      Denies excessive ETOH use, no marijuana, smoking, vapping or any non prescriptive drug use.     Family hx of breast, endometrial or ovarian CA: Paternal Aunt- Breast CA in late 40's early 50's    B/p elevated today, will recheck. Pt reports has not taken b/p meds yet.     Breast Cancer risk assessment score 6.1%  Rowena Gunderson risk assessment calculator used. [email or web address]        Note: This is a gyn only visit. Pt  is referred back to PCP for care of any non-gyn concerns listed above and  in the Problem List.      PHQ-2 SCORE: 0  , done 2025          Depression Screening (PHQ-2/PHQ-9): Over the LAST 2 WEEKS   Little interest or pleasure in doing things (over the last two weeks)?: Not at all  Little interest or pleasure in doing things: Not at all    Feeling down, depressed, or hopeless (over the last two weeks)?: Not at all  Feeling down, depressed, or hopeless: Not at all    PHQ-2 SCORE: 0  PHQ-2 SCORE: 0           HISTORY:  Past Medical History:    Back problem    Bronchitis     Cardiomyopathy (HCC)    \"enlarged heart\" per pt    Essential hypertension    Fibromyalgia    GERD (gastroesophageal reflux disease)    repeat EGD in  due to esophagitis    High blood pressure    Prediabetes    Problems with swallowing    Tubular adenoma of colon    repeat CLN in - sees Dr. Allen at [facility]      Past Surgical History:   Procedure Laterality Date          Colonoscopy N/A 2019    Procedure: COLONOSCOPY;  Surgeon: SARAH Pina MD;  Location: [facility] ENDOSCOPY    Colonoscopy N/A 2020    Procedure: COLONOSCOPY;  Surgeon: SARAH Pina MD;  Location: [facility] ENDOSCOPY    Colonoscopy      Cyst aspiration right  2014    Hysterectomy      38    Desmond localization wire 1 site right (cpt=19281)      Needle biopsy right      in her 20's    Other surgical history  2024    cervical lymphadenopathy    Total abdom hysterectomy      15 yrs ago      Family History   Problem Relation Age of Onset    Other (Other) Mother     Heart Disorder Father     Prostate Cancer Maternal Grandfather 77    Breast Cancer Paternal Aunt         in her 50's      Social History:   Social History     Socioeconomic History    Marital status: Single   Tobacco Use    Smoking status: Former     Types: Cigarettes     Start date:      Passive exposure: Past    Smokeless tobacco: Former   Vaping Use    Vaping status: Never Used   Substance and Sexual Activity    Alcohol use: Yes     Alcohol/week: 1.0 standard drink of alcohol     Types: 1 Cans of beer per week     Comment: Socially    Drug use: No    Sexual activity: Not Currently        OB History    Para Term  AB Living   1 1 0 0 0 0   SAB IAB Ectopic Multiple Live Births   0 0 0 0 0       Medications (Active prior to today's visit):  Current Outpatient Medications   Medication Sig Dispense Refill    methocarbamol 750 MG Oral Tab Take 1 tablet (750 mg total) by mouth 3 (three) times daily. No driving while taking medication because of  the sedation 60 tablet 0    topiramate 25 MG Oral Tab Take 1 tablet (25 mg total) by mouth 2 (two) times daily. 90 tablet 0    losartan 50 MG Oral Tab Take 1 tablet (50 mg total) by mouth daily. 90 tablet 3    hydroCHLOROthiazide 25 MG Oral Tab Take 1 tablet (25 mg total) by mouth daily. 90 tablet 1    atenolol 50 MG Oral Tab Take 1 tablet (50 mg total) by mouth daily. 90 tablet 3    pantoprazole 40 MG Oral Tab EC Take 1 tablet (40 mg total) by mouth every morning before breakfast. 90 tablet 3    Multiple Vitamin (MULTI-VITAMIN) Oral Tab Take 1 tablet by mouth daily.      ketoconazole 2 % External Cream APPLY  CREAM TOPICALLY TO AFFECTED AREA TWICE DAILY 30 g 0    Aspirin 81 MG Oral Tab Take 1 tablet (81 mg total) by mouth daily.         Allergies:  Allergies[1]      ROS:     Review of Systems   Constitutional: Negative.    Gastrointestinal: Negative.    Genitourinary: Negative.          PHYSICAL EXAM:     Vitals:    02/11/25 0950   BP: 144/80   Pulse: 62     Physical Exam  Constitutional:       General: She is not in acute distress.     Appearance: Normal appearance. She is not ill-appearing.   Pulmonary:      Effort: Pulmonary effort is normal.   Chest:   Breasts:     Right: No swelling, bleeding, inverted nipple, mass, nipple discharge, skin change or tenderness.      Left: No swelling, bleeding, inverted nipple, mass, nipple discharge, skin change or tenderness.   Genitourinary:     General: Normal vulva.      Labia:         Right: No rash, tenderness, lesion or injury.         Left: No rash, tenderness, lesion or injury.       Vagina: No signs of injury and foreign body. Vaginal discharge (creamy white discharge) present. No erythema, tenderness, bleeding, lesions or prolapsed vaginal walls.      Cervix: No cervical motion tenderness, discharge, friability, lesion, erythema, cervical bleeding or eversion.      Uterus: Absent.    Skin:     General: Skin is warm and dry.   Neurological:      Mental Status: She is  alert and oriented to person, place, and time.   Psychiatric:         Mood and Affect: Mood normal.         Behavior: Behavior normal.         Thought Content: Thought content normal.          ASSESSMENT/PLAN:      Diagnoses and all orders for this visit:    Women's annual routine gynecological examination    Papanicolaou smear for cervical cancer screening    Breast cancer screening by mammogram  -     [facility] ISHAN 2D+3D SCREENING BILAT (CPT=77067/03342); Future        Normal gyne exam. Pap with HPV to lab. pap guidelines reviewed. Can stop pap's at 65 as hysterectomy for non cancerous reasons.   STD testing: Declines  Discussed breast awareness. Mammogram ordered, due in July  Discussed Calcium  and Vitamin D supplementation. Is taking  F/U with PCP if b/p remains elevated. PCP for annual exams & mgmt of chronic medical problems           2/11/2025  Breanne Kiran CNM          [1]   Allergies  Allergen Reactions    Latex HIVES    Morphine Sulfate ITCHING

## 2025-02-12 LAB
BV BACTERIA DNA VAG QL NAA+PROBE: NEGATIVE
C GLABRATA DNA VAG QL NAA+PROBE: NEGATIVE
C KRUSEI DNA VAG QL NAA+PROBE: NEGATIVE
CANDIDA DNA VAG QL NAA+PROBE: NEGATIVE
HPV E6+E7 MRNA CVX QL NAA+PROBE: NEGATIVE
LAST PAP RESULT: NORMAL
T VAGINALIS DNA VAG QL NAA+PROBE: NEGATIVE

## 2025-03-21 ENCOUNTER — OFFICE VISIT (OUTPATIENT)
Dept: INTERNAL MEDICINE CLINIC | Facility: CLINIC | Age: 60
End: 2025-03-21
Payer: MEDICAID

## 2025-03-21 VITALS
HEART RATE: 60 BPM | WEIGHT: 217 LBS | OXYGEN SATURATION: 98 % | BODY MASS INDEX: 38.45 KG/M2 | SYSTOLIC BLOOD PRESSURE: 126 MMHG | TEMPERATURE: 97 F | HEIGHT: 63 IN | DIASTOLIC BLOOD PRESSURE: 85 MMHG

## 2025-03-21 DIAGNOSIS — R20.2 NUMBNESS AND TINGLING IN BOTH HANDS: ICD-10-CM

## 2025-03-21 DIAGNOSIS — R73.03 PREDIABETES: Primary | ICD-10-CM

## 2025-03-21 DIAGNOSIS — R20.0 NUMBNESS AND TINGLING IN BOTH HANDS: ICD-10-CM

## 2025-03-21 DIAGNOSIS — K62.5 RECTAL BLEEDING: ICD-10-CM

## 2025-03-21 DIAGNOSIS — I10 PRIMARY HYPERTENSION: ICD-10-CM

## 2025-03-21 PROCEDURE — 99214 OFFICE O/P EST MOD 30 MIN: CPT | Performed by: INTERNAL MEDICINE

## 2025-03-27 NOTE — PROGRESS NOTES
Subjective:     Patient ID: Triny Head is a 59 year old female presents for follow-up on hypertension, prediabetes, headache numbness tingling in the hands    HPI  Patient reports that lately she is experiencing occipital headaches worse on sitting certain positions, she drives car a lot for work.  Headache at times can wake you up from sleep.    Continues to be bothered by numbness and tingling in the right hand more frequently than left, symptoms present for several years and getting worse becoming more bothersome.  She reports that she sleeps with a wrist flexed on the right side of the body, she was advised in the past to undergo EMG which she has not done, symptoms suspicious for carpal tunnel syndrome.  She has upcoming appointment with neurology.  Labs in January showed hemoglobin A1c 6.3 stable elevated creatinine and BUN, she reports that she does not drink enough liquids.  Knows that she needs to get better at that.  On several occasions so blood with stool and concerned about that, wants to see gastroenterologist again    Current Outpatient Medications   Medication Sig Dispense Refill    methocarbamol 750 MG Oral Tab Take 1 tablet (750 mg total) by mouth 3 (three) times daily. No driving while taking medication because of the sedation 60 tablet 0    topiramate 25 MG Oral Tab Take 1 tablet (25 mg total) by mouth 2 (two) times daily. 90 tablet 0    losartan 50 MG Oral Tab Take 1 tablet (50 mg total) by mouth daily. 90 tablet 3    hydroCHLOROthiazide 25 MG Oral Tab Take 1 tablet (25 mg total) by mouth daily. 90 tablet 1    atenolol 50 MG Oral Tab Take 1 tablet (50 mg total) by mouth daily. 90 tablet 3    pantoprazole 40 MG Oral Tab EC Take 1 tablet (40 mg total) by mouth every morning before breakfast. 90 tablet 3    ketoconazole 2 % External Cream APPLY  CREAM TOPICALLY TO AFFECTED AREA TWICE DAILY 30 g 0    Multiple Vitamin (MULTI-VITAMIN) Oral Tab Take 1 tablet by mouth daily.      Aspirin 81 MG  Oral Tab Take 1 tablet (81 mg total) by mouth daily.       Allergies:Allergies[1]    Past Medical History:    Arthritis    Back problem    Bronchitis    Cardiomyopathy (HCC)    \"enlarged heart\" per pt    Essential hypertension    Fibromyalgia    GERD (gastroesophageal reflux disease)    repeat EGD in  due to esophagitis    High blood pressure    Prediabetes    Problems with swallowing    Tubular adenoma of colon    repeat CLN in - sees Dr. Allen at [facility]      Past Surgical History:   Procedure Laterality Date          Colonoscopy N/A 2019    Procedure: COLONOSCOPY;  Surgeon: SARAH Pina MD;  Location: [facility] ENDOSCOPY    Colonoscopy N/A 2020    Procedure: COLONOSCOPY;  Surgeon: SARAH Pina MD;  Location: [facility] ENDOSCOPY    Colonoscopy      Cyst aspiration right  2014    Hysterectomy      38    Desmond localization wire 1 site right (cpt=19281)      Needle biopsy right      in her 20's    Other surgical history  2024    cervical lymphadenopathy    Total abdom hysterectomy      15 yrs ago      Family History   Problem Relation Age of Onset    Other (Other) Mother     Heart Disorder Father     Hypertension Father     Prostate Cancer Maternal Grandfather 77    Cancer Maternal Grandfather     Breast Cancer Paternal Aunt         in her 50's    Diabetes Sister       Social History:   Social History     Socioeconomic History    Marital status: Single   Tobacco Use    Smoking status: Former     Types: Cigarettes     Start date:      Passive exposure: Past    Smokeless tobacco: Former   Vaping Use    Vaping status: Never Used   Substance and Sexual Activity    Alcohol use: Yes     Alcohol/week: 1.0 standard drink of alcohol     Comment: Maybe once every two other weeks.    Drug use: No    Sexual activity: Not Currently        /85 (BP Location: Left arm, Patient Position: Sitting, Cuff Size: adult)   Pulse 60   Temp 96.9 °F (36.1 °C) (Temporal)   Ht 5' 3\" (1.6 m)   Wt  217 lb (98.4 kg)   SpO2 98%   BMI 38.44 kg/m²    Physical Exam  Constitutional:       Appearance: Normal appearance. She is obese.   Eyes:      Extraocular Movements: Extraocular movements intact.      Conjunctiva/sclera: Conjunctivae normal.      Pupils: Pupils are equal, round, and reactive to light.   Cardiovascular:      Rate and Rhythm: Normal rate and regular rhythm.      Heart sounds: No murmur heard.     No gallop.   Pulmonary:      Effort: Pulmonary effort is normal.      Breath sounds: Normal breath sounds.   Musculoskeletal:         General: Normal range of motion.      Right lower leg: No edema.      Left lower leg: No edema.      Comments: There is a tension over trapezoid muscles bilaterally   Skin:     General: Skin is warm.      Coloration: Skin is not jaundiced.   Neurological:      General: No focal deficit present.      Mental Status: She is alert and oriented to person, place, and time. Mental status is at baseline.   Psychiatric:         Mood and Affect: Mood normal.         Behavior: Behavior normal.         Thought Content: Thought content normal.         Assessment & Plan:   1. Prediabetes at length discussed low carbohydrate diet, avoid processed food, regular sweets, portion control, encourage patient to increase regular physical activities for 5 times a week.  Will recheck hemoglobin A1c again and CMP   2. Primary hypertension controlled on current medication continue low-salt diet, encouraged work hard on weight loss, will monitor kidney function periodically   3. Rectal bleeding see gastroenterology   4. Numbness and tingling in both hands patient is for carpal tunnel syndrome, encourage patient to proceed with scheduling EMG, get carpal tunnel splints and wear them at night and with over flexion of the wrist 4 hours       Orders Placed This Encounter   Procedures    Comp Metabolic Panel (14)    Hemoglobin A1C     Follow-up after all workup completed in 3 months  Meds This  Visit:  Requested Prescriptions      No prescriptions requested or ordered in this encounter       Imaging & Referrals:  [facility] - INTERNAL            [1]   Allergies  Allergen Reactions    Latex HIVES    Morphine Sulfate ITCHING

## 2025-04-09 ENCOUNTER — LAB ENCOUNTER (OUTPATIENT)
Dept: LAB | Facility: HOSPITAL | Age: 60
End: 2025-04-09
Attending: Other
Payer: MEDICAID

## 2025-04-09 ENCOUNTER — OFFICE VISIT (OUTPATIENT)
Dept: NEUROLOGY | Facility: CLINIC | Age: 60
End: 2025-04-09
Payer: MEDICAID

## 2025-04-09 DIAGNOSIS — R20.0 NUMBNESS AND TINGLING OF BOTH LEGS: ICD-10-CM

## 2025-04-09 DIAGNOSIS — G47.51 CONFUSIONAL AROUSALS: ICD-10-CM

## 2025-04-09 DIAGNOSIS — R25.2 LEG CRAMPS: Primary | ICD-10-CM

## 2025-04-09 DIAGNOSIS — R20.2 NUMBNESS AND TINGLING OF BOTH LEGS: ICD-10-CM

## 2025-04-09 DIAGNOSIS — G31.84 MCI (MILD COGNITIVE IMPAIRMENT) WITH MEMORY LOSS: ICD-10-CM

## 2025-04-09 DIAGNOSIS — G56.01 CARPAL TUNNEL SYNDROME OF RIGHT WRIST: ICD-10-CM

## 2025-04-09 DIAGNOSIS — R25.1 TREMOR: ICD-10-CM

## 2025-04-09 LAB
FOLATE SERPL-MCNC: 11.4 NG/ML (ref 5.4–?)
T PALLIDUM AB SER QL IA: NONREACTIVE
TSI SER-ACNC: 1.65 UIU/ML (ref 0.55–4.78)
VIT B12 SERPL-MCNC: >2000 PG/ML (ref 211–911)

## 2025-04-09 PROCEDURE — 82607 VITAMIN B-12: CPT

## 2025-04-09 PROCEDURE — 83520 IMMUNOASSAY QUANT NOS NONAB: CPT

## 2025-04-09 PROCEDURE — 86780 TREPONEMA PALLIDUM: CPT

## 2025-04-09 PROCEDURE — 36415 COLL VENOUS BLD VENIPUNCTURE: CPT

## 2025-04-09 PROCEDURE — 84443 ASSAY THYROID STIM HORMONE: CPT

## 2025-04-09 PROCEDURE — 82746 ASSAY OF FOLIC ACID SERUM: CPT

## 2025-04-09 PROCEDURE — 99214 OFFICE O/P EST MOD 30 MIN: CPT | Performed by: OTHER

## 2025-04-09 NOTE — PROGRESS NOTES
Neurology follow-up visit     Referred By: Dr. Frazier ref. provider found    Chief Complaint:   Chief Complaint   Patient presents with    Neurologic Problem     LOV 7/11/2024 Seen for Leg Cramps.    Patient here today 9 month follow up for test orders/results of EMG is scheduled in May. Patient c/o worsening numbness and jerking head and arms, unable to sleep with fatigue  Denies dizziness or nausea.   Patient gives verbal consent to use [facility].        HPI:     Triny Head is a 59 year old female, who presents for rambling and paresthesias.  Apparently patient has had history of carpal tunnel syndrome, she even had a surgery back some years ago on the left side.  Back pain since at least 2020 or so patient started develop this feeling of cramping in her legs especially, some cramping in her hands as well.  Especially when she is at rest however she is trying to go to sleep.  She says needs to get out of the car to walk around.  At night she frequently gets up to walk around the coffee table in the bedroom.  Sometimes cramping makes her feel as if she is spreading her toes or fingers.  CPK in June was normal but then it was elevated in September.  History of fibromyalgia, hypertension.  Patient also has been developing some feeling of tingling and numbness in the fingertips of both hands, occasionally dropping things from her hands.     EMG showed evidence of mild to moderate carpal tunnel on the right side.  No significant neuropathy.    Patient was lost to follow-up for 3 years and came back.  Her hand weakness was getting worse.  She continued to have cramps in the legs.  But she did not do gabapentin anymore.  Also she was concerned that she was forgetful.  MRI of the brain was done in May 2024, still showed some nonspecific white matter changes.    B12, TSH, folic acid, RPR was checked and that was not revealing.  Neuropsychological testing also was ordered.  That was not done yet by the time she came for  follow-up in 2024.  She felt overall better in terms of her cognition.    Patient came back for follow up in in 2025.  History of Present Illness  Triny Head is a 59 year old female who presents with muscle twitching and sleep disturbances.    She experiences significant sleep disturbances characterized by an overwhelming sensation in her head as she is falling asleep, prompting her to abruptly awaken. This sensation is described as a faint feeling or as if she is 'checking out', leading to a fear of sleeping and resulting in only two to three hours of sleep per night. Sleeping on her stomach provides some relief. She occasionally snores and feels sleepy during the day due to lack of sleep. No confusion during the episodes that wake her from sleep.    She experiences muscle twitching in her arm, described as jumping or vibrating sensations localized to a muscle rather than the whole arm. These episodes last about three seconds and recur intermittently. She previously experienced similar sensations in her head, which have since resolved.    She has a history of carpal tunnel syndrome, causing numbness in her hand. She saw a hand surgeon over a year ago, but surgery was not deemed necessary at that time.    She mentions memory problems and has not yet completed a memory test. She is awaiting an ENG appointment scheduled for May 15th. She recalls having an MRI last year, which was normal, ruling out strokes and tumors.        Past Medical History:    Arthritis    Back problem    Bronchitis    Cardiomyopathy (HCC)    \"enlarged heart\" per pt    Essential hypertension    Fibromyalgia    GERD (gastroesophageal reflux disease)    repeat EGD in  due to esophagitis    High blood pressure    Prediabetes    Problems with swallowing    Tubular adenoma of colon    repeat CLN in - sees Dr. Allen at [facility]       Past Surgical History:   Procedure Laterality Date          Colonoscopy N/A  07/17/2019    Procedure: COLONOSCOPY;  Surgeon: SARAH Pina MD;  Location: [facility] ENDOSCOPY    Colonoscopy N/A 09/01/2020    Procedure: COLONOSCOPY;  Surgeon: SARAH Pina MD;  Location: [facility] ENDOSCOPY    Colonoscopy      Cyst aspiration right  2014    Hysterectomy      38    Desmond localization wire 1 site right (cpt=19281)  2005    Needle biopsy right      in her 20's    Other surgical history  07/19/2024    cervical lymphadenopathy    Total abdom hysterectomy      15 yrs ago       Social history:  History   Smoking Status    Former    Types: Cigarettes   Smokeless Tobacco    Former     History   Alcohol Use    1.0 standard drink of alcohol/week     Comment: Maybe once every two other weeks.     History   Drug Use No       Family History   Problem Relation Age of Onset    Other (Other) Mother     Heart Disorder Father     Hypertension Father     Prostate Cancer Maternal Grandfather 77    Cancer Maternal Grandfather     Breast Cancer Paternal Aunt         in her 50's    Diabetes Sister          Current Outpatient Medications:     methocarbamol 750 MG Oral Tab, Take 1 tablet (750 mg total) by mouth 3 (three) times daily. No driving while taking medication because of the sedation, Disp: 60 tablet, Rfl: 0    topiramate 25 MG Oral Tab, Take 1 tablet (25 mg total) by mouth 2 (two) times daily., Disp: 90 tablet, Rfl: 0    losartan 50 MG Oral Tab, Take 1 tablet (50 mg total) by mouth daily., Disp: 90 tablet, Rfl: 3    hydroCHLOROthiazide 25 MG Oral Tab, Take 1 tablet (25 mg total) by mouth daily., Disp: 90 tablet, Rfl: 1    atenolol 50 MG Oral Tab, Take 1 tablet (50 mg total) by mouth daily., Disp: 90 tablet, Rfl: 3    pantoprazole 40 MG Oral Tab EC, Take 1 tablet (40 mg total) by mouth every morning before breakfast., Disp: 90 tablet, Rfl: 3    ketoconazole 2 % External Cream, APPLY  CREAM TOPICALLY TO AFFECTED AREA TWICE DAILY, Disp: 30 g, Rfl: 0    Multiple Vitamin (MULTI-VITAMIN) Oral Tab, Take 1 tablet by mouth  daily., Disp: , Rfl:     Aspirin 81 MG Oral Tab, Take 1 tablet (81 mg total) by mouth daily., Disp: , Rfl:     Allergies   Allergen Reactions    Latex HIVES    Morphine Sulfate ITCHING       ROS:   As in HPI, the rest of the 14 system review was done and was negative      Physical Exam:  There were no vitals filed for this visit.      General: No apparent distress, well nourished, well groomed.  Head- Normocephalic, atraumatic  Eyes- No redness or swelling  ENT- Hearing intake, normal glutition  Neck- No masses or adenopathy  Cv: pulses were palpable and normal, no cyanosis or edema     Neurological:     Mental Status- Alert MoCA was done and was 21/30 and showed deficiencies in multiple domains.      Cranial Nerves:    VII. Face symmetric, no facial weakness  VIII. Hearing intact to whisper.  IX. Pallet elevates symmetrically.  XI. Shoulder shrug is intact  XII. Tongue is midline    Motor Exam:  Muscle tone normal  No atrophy or fasciculations  Strength- upper extremities 5/5 proximally and distally                   Rapid alternating movements intact    Gait:  Normal posture  Normal physiologic      Labs:    Lab Results   Component Value Date    TSH 1.516 04/01/2024     Lab Results   Component Value Date    HDL 76 (H) 08/09/2023     (H) 08/09/2023    TRIG 47 08/09/2023     Lab Results   Component Value Date    HGB 14.2 01/22/2025    HCT 44.0 01/22/2025    MCV 80.0 01/22/2025    WBC 6.0 01/22/2025    .0 01/22/2025      Lab Results   Component Value Date    BUN 24 (H) 01/22/2025    CA 10.0 01/22/2025    ALT 18 01/22/2025    AST 16 01/22/2025    ALB 4.5 01/22/2025     01/22/2025    K 3.7 01/22/2025     01/22/2025    CO2 29.0 01/22/2025      I have reviewed labs.  MRI of the brain was independent reviewed, no obvious significant abnormalities except some minimal white matter changes that would unlikely to explain her cognition problems    Assessment   1. Paresthesias  Possibility of some  return of carpal tunnel syndrome.  EMG was ordered.  In the meantime patient also will be referred to a hand surgeon    2. Cramps, extremity  Strong possibility of possible restless leg syndrome, but some dystonic features is being described as well, extensive workup was not revealing.  Patient decided not to do any symptomatic management at this point.  DaTscan also will be done to rule out any parkinsonian syndromes.    3.  Memory loss    MRI of the brain so far was not revealing, some minimal white matter changes noted only.  Emphasized importance of treatment of hypertension and diabetes.  Neuropsychological testing is still pending.  Blood work was not revealing.  Will do amyloid ratio.  Neuropsychological testing is still pending.  Sleep study also will be done, some kind of parasomnias being described.  She will have a follow-up after that with sleep specialist.         Education and counseling provided to patient. Instructed patient to call my office or seek medical attention immediately if symptoms worsen.  Patient verbalized understanding of information given. All questions were answered. All side effects of drugs were discussed.     Return to clinic in: No follow-ups on file.    Aldo De La Rosa MD

## 2025-04-11 LAB
BETA-AMYLOID 40: 172.08 PG/ML
BETA-AMYLOID 40: 172.08 PG/ML
BETA-AMYLOID 42/40 RATIO: 0.12
BETA-AMYLOID 42/40 RATIO: 0.12
BETA-AMYLOID 42: 20.66 PG/ML
BETA-AMYLOID 42: 20.66 PG/ML

## 2025-04-14 ENCOUNTER — HOSPITAL ENCOUNTER (OUTPATIENT)
Dept: NUCLEAR MEDICINE | Facility: HOSPITAL | Age: 60
Discharge: HOME | End: 2025-04-14
Attending: INTERNAL MEDICINE
Payer: MEDICAID

## 2025-04-14 ENCOUNTER — HOSPITAL ENCOUNTER (OUTPATIENT)
Dept: CV DIAGNOSTICS | Facility: HOSPITAL | Age: 60
Discharge: HOME | End: 2025-04-14
Attending: INTERNAL MEDICINE
Payer: MEDICAID

## 2025-04-14 ENCOUNTER — TELEPHONE (OUTPATIENT)
Age: 60
End: 2025-04-14

## 2025-04-14 DIAGNOSIS — R07.89 OTHER CHEST PAIN: ICD-10-CM

## 2025-04-14 DIAGNOSIS — R20.2 NUMBNESS AND TINGLING IN BOTH HANDS: ICD-10-CM

## 2025-04-14 DIAGNOSIS — I10 PRIMARY HYPERTENSION: ICD-10-CM

## 2025-04-14 DIAGNOSIS — R20.0 NUMBNESS AND TINGLING IN BOTH HANDS: ICD-10-CM

## 2025-04-14 LAB
% OF MAX PREDICTED HR: 100 %
MAX DIASTOLIC BP: 80 MMHG
MAX HEART RATE: 139 BPM
MAX PREDICTED HEART RATE: 159 BPM
MAX SYSTOLIC BP: 200 MMHG
MAX WORK LOAD: 70

## 2025-04-14 PROCEDURE — 93016 CV STRESS TEST SUPVJ ONLY: CPT | Performed by: INTERNAL MEDICINE

## 2025-04-14 PROCEDURE — 93018 CV STRESS TEST I&R ONLY: CPT | Performed by: INTERNAL MEDICINE

## 2025-04-14 PROCEDURE — 93017 CV STRESS TEST TRACING ONLY: CPT | Performed by: INTERNAL MEDICINE

## 2025-04-14 PROCEDURE — 78452 HT MUSCLE IMAGE SPECT MULT: CPT | Performed by: INTERNAL MEDICINE

## 2025-04-22 ENCOUNTER — OFFICE VISIT (OUTPATIENT)
Facility: LOCATION | Age: 60
End: 2025-04-22
Payer: MEDICAID

## 2025-04-22 ENCOUNTER — TELEPHONE (OUTPATIENT)
Dept: OTOLARYNGOLOGY | Facility: CLINIC | Age: 60
End: 2025-04-22

## 2025-04-22 VITALS — HEIGHT: 63 IN | WEIGHT: 217 LBS | BODY MASS INDEX: 38.45 KG/M2

## 2025-04-22 DIAGNOSIS — K21.9 LARYNGOPHARYNGEAL REFLUX (LPR): Primary | ICD-10-CM

## 2025-04-22 DIAGNOSIS — R59.0 CERVICAL LYMPHADENOPATHY: ICD-10-CM

## 2025-04-22 DIAGNOSIS — R09.A2 GLOBUS SENSATION: ICD-10-CM

## 2025-04-22 PROCEDURE — 31575 DIAGNOSTIC LARYNGOSCOPY: CPT | Performed by: STUDENT IN AN ORGANIZED HEALTH CARE EDUCATION/TRAINING PROGRAM

## 2025-04-22 PROCEDURE — 99213 OFFICE O/P EST LOW 20 MIN: CPT | Performed by: STUDENT IN AN ORGANIZED HEALTH CARE EDUCATION/TRAINING PROGRAM

## 2025-04-22 RX ORDER — OMEPRAZOLE 40 MG/1
40 CAPSULE, DELAYED RELEASE ORAL DAILY
Qty: 30 CAPSULE | Refills: 2 | Status: SHIPPED | OUTPATIENT
Start: 2025-04-22

## 2025-04-22 RX ORDER — AZELASTINE 1 MG/ML
1 SPRAY, METERED NASAL 2 TIMES DAILY
Qty: 30 ML | Refills: 3 | Status: SHIPPED | OUTPATIENT
Start: 2025-04-22

## 2025-04-22 RX ORDER — FLUTICASONE PROPIONATE 50 MCG
1 SPRAY, SUSPENSION (ML) NASAL 2 TIMES DAILY
Qty: 16 G | Refills: 3 | Status: SHIPPED | OUTPATIENT
Start: 2025-04-22

## 2025-04-22 NOTE — TELEPHONE ENCOUNTER
Prior Authorization thru [employer]:    Primary Diagnosis Code: R09.A2 Description: Foreign body sensation, throat  Secondary Diagnosis Code: K21.9 Description: Gastro-esophageal reflux disease without esophagitis  CPT Code: MSMST Description: SPEECH THERAPY  Authorization Number: [id number]  Case Number: [id number]  Review Date: 4/22/2025 11:40:20 AM  Approved Treatment Start Date: 4/22/2025  Expiration Date: 6/21/2025  Status: You have been approved for 1 visits and 2 units. Please use these visits before requesting more visits. Your therapist may submit another notification if authorization for additional care is necessary. Your case has been approved for 1 visits  The prior authorization you submitted, Case [id number], has been received. Additional case status notifications will be sent if you opted in for email notifications. Thank you.

## 2025-04-22 NOTE — PROGRESS NOTES
[facility]  OTOLARYNGOLOGY - HEAD & NECK SURGERY    4/22/2025     Reason for Consultation:   Neck lump    History of Present Illness:   Patient is a pleasant 59 year old female who is being seen for 3 to 4 weeks of a lump that she noticed in her submental area.  The patient states that she has had recent episodes of laryngitis and was having some throat pain with swallowing.  She does also have a history of reflux and has an appointment with a gastroenterologist coming up.  She was then feeling her neck and noticed a lump underneath her chin.  She states it is not painful.  She is unsure if it is growing in size.  She does not have any history of consistent tobacco use, no family history of head neck cancer, she denies any night sweats, fevers, weight loss.  She is here because this finding has caused her to be anxious regarding this.    INTERVAL HISTORY  7/9/2024: The patient returns today with continuation of her submental lymph node.  She is unsure if it has gotten any larger but she says it is noticeable.  It does cause her discomfort when she moves around.  She has not had any fevers or night sweats.  No unintended weight loss.  She is hoping to have this removed and sent for pathology.  7/29/2024: Patient is 10 days post op from excision of submental lymph node  for biopsy which is favored to be reactive based on pathology and flow cytometry.  8/6/2024: Patient returns today because she was having some firmness around her incision.  She denies any pain around the incision.  She was just concerned that she had some fullness here.  11/6/2024: Patient returns today for follow-up on her submental incision and her fullness.  She states that she has been feeling well and has not felt any mass around there.  The firmness around her incision is now improved.  She does note however that she has been having some tenderness inside her ear canals and some itchiness.  She does use Q-tips.  4/22/2025: The patient returns  today for different symptoms now.  She feels as though she has something stuck in her throat constantly.  Does have a lot of throat clearing.  Does have a history of gastroesophageal reflux.  Has an appointment with her gastroenterologist on the .  Has been taking her PPI less often now.  She also has a history of allergic rhinitis but is not currently on any nasal sprays.     Past Medical History  Past Medical History:    Arthritis    Back problem    Bronchitis    Cardiomyopathy (HCC)    \"enlarged heart\" per pt    Essential hypertension    Fibromyalgia    GERD (gastroesophageal reflux disease)    repeat EGD in  due to esophagitis    High blood pressure    Prediabetes    Problems with swallowing    Tubular adenoma of colon    repeat CLN in - sees Dr. Allen at [facility]       Past Surgical History  Past Surgical History:   Procedure Laterality Date          Colonoscopy N/A 2019    Procedure: COLONOSCOPY;  Surgeon: SARAH Pina MD;  Location: [facility] ENDOSCOPY    Colonoscopy N/A 2020    Procedure: COLONOSCOPY;  Surgeon: SARAH Pina MD;  Location: [facility] ENDOSCOPY    Colonoscopy      Cyst aspiration right  2014    Hysterectomy      38    Desmond localization wire 1 site right (cpt=19281)      Needle biopsy right      in her 20's    Other surgical history  2024    cervical lymphadenopathy    Total abdom hysterectomy      15 yrs ago       Family History  Family History   Problem Relation Age of Onset    Other (Other) Mother     Heart Disorder Father     Hypertension Father     Prostate Cancer Maternal Grandfather 77    Cancer Maternal Grandfather     Breast Cancer Paternal Aunt         in her 50's    Diabetes Sister        Social History  Pediatric History   Patient Parents    Not on file     Other Topics Concern    Not on file   Social History Narrative    Not on file           Current Medications:  Current Outpatient Medications   Medication Sig Dispense Refill    losartan 50  MG Oral Tab Take 1 tablet (50 mg total) by mouth daily. 90 tablet 3    hydroCHLOROthiazide 25 MG Oral Tab Take 1 tablet (25 mg total) by mouth daily. 90 tablet 1    atenolol 50 MG Oral Tab Take 1 tablet (50 mg total) by mouth daily. 90 tablet 3    pantoprazole 40 MG Oral Tab EC Take 1 tablet (40 mg total) by mouth every morning before breakfast. 90 tablet 3    ketoconazole 2 % External Cream APPLY  CREAM TOPICALLY TO AFFECTED AREA TWICE DAILY 30 g 0    Multiple Vitamin (MULTI-VITAMIN) Oral Tab Take 1 tablet by mouth daily.      Aspirin 81 MG Oral Tab Take 1 tablet (81 mg total) by mouth daily.      methocarbamol 750 MG Oral Tab Take 1 tablet (750 mg total) by mouth 3 (three) times daily. No driving while taking medication because of the sedation 60 tablet 0    topiramate 25 MG Oral Tab Take 1 tablet (25 mg total) by mouth 2 (two) times daily. 90 tablet 0       Allergies  Allergies   Allergen Reactions    Latex HIVES    Morphine Sulfate ITCHING       Review of Systems:   A comprehensive 10 point review of systems was completed.  Pertinent positives and negatives noted in the the HPI.    Physical Exam:   Height 5' 3\" (1.6 m), weight 217 lb (98.4 kg).    GENERAL: No acute distress, Comfortable appearing  FACE: HB 1/6, Normal Animation  HEAD: Normocephalic  EYES: EOMI, pupils equil  EARS: Bilateral Auricles Symmetric, bilateral tympanic membranes normal  NOSE: Nares patent bilaterally  ORAL CAVITY: Patient deferred oral cavity exam today  NECK: Submental incision is fully healed, soft    PROCEDURE: FLEXIBLE FIBEROPTIC LARYNGOSCOPY (49267)    Due to inability for adequate examination of the larynx and need for magnification to perform the examination, endoscopy was performed.  Risks and benefits were discussed with patient/family and they have given verbal consent to proceed.    Procedure Detail & Findings:     After placement of topical anesthetic intranasally the flexible laryngoscope was inserted into the nare and  driven through the nasal cavity with no significant abnormal findings noted in the nasal cavities or nasopharynx. The oropharynx, hypopharynx and larynx were subsequently examined and the following findings were noted:    Nasal cavities and nasopharynx: There is pale hypertrophy of the bilateral inferior turbinates with posterior turbinate cobblestoning, there is copious amounts of clear postnasal drip noted    Base of Tongue: Normal    Valeculla: Normal    Arytenoids: Normal    Introitus of the esophagus: Moderate post glottic edema noted    Epiglottis: Normal    False vocal cords: Normal    True vocal cords: Normal    Subglottic space: Normal    Mobility of True vocal cords: Normal    Condition: Stable    Complications: Patient tolerated the procedure well with no immediate complication.    Results:     Laboratory Data:  Lab Results   Component Value Date    WBC 6.0 01/22/2025    HGB 14.2 01/22/2025    HCT 44.0 01/22/2025    .0 01/22/2025    CREATSERUM 1.18 (H) 01/22/2025    BUN 24 (H) 01/22/2025     01/22/2025    K 3.7 01/22/2025     01/22/2025    CO2 29.0 01/22/2025     (H) 01/22/2025    CA 10.0 01/22/2025    ALB 4.5 01/22/2025    ALKPHO 120 01/22/2025    TP 7.7 01/22/2025    AST 16 01/22/2025    ALT 18 01/22/2025    PTT 24.0 07/11/2024    INR 0.96 07/11/2024    PTP 13.4 07/11/2024    T4F 1.2 08/09/2023    TSH 1.649 04/09/2025    LIP 64 (H) 01/17/2019    ESRML 26 05/20/2024    CRP 2.00 (H) 05/20/2024    MG 2.2 03/03/2023    TROP <0.045 05/07/2021     08/15/2022    B12 >2,000 (H) 04/09/2025         Imaging:  MRI BRAIN (CPT=70551)    Result Date: 5/3/2024  PROCEDURE: MRI BRAIN (CPT=70551)  COMPARISON: None.  INDICATIONS: G31.84 MCI (mild cognitive impairment) with memory loss  TECHNIQUE: A variety of imaging planes and parameters were utilized for visualization of suspected pathology.  Images were performed without contrast.  FINDINGS:  CEREBRUM: Scattered T2/FLAIR signal  hyperintense foci within the superficial and deep cerebral white matter without mass effect or diffusion restriction.  There is otherwise no intra-axial mass, mass effect, or hemorrhage.  No abnormal diffusion restriction to suggest acute or subacute infarct. CEREBELLUM: No edema, hemorrhage, mass, acute infarction, or inappropriate atrophy.  BRAINSTEM: No edema, hemorrhage, mass, acute infarction, or inappropriate atrophy.  CSF SPACES: Ventricles, cisterns, and sulci are appropriate for age.  No hydrocephalus, subarachnoid hemorrhage, or mass.  SKULL: No mass or other significant visible lesion.  SINUSES: Limited views demonstrate no significant mucosal thickening or fluid.  ORBITS: Limited views are unremarkable.  OTHER: Negative.          CONCLUSION: No acute intracranial process.  Scattered T2/FLAIR signal changes within the cerebral white matter which may represent sequela of prior inflammation, prior infection, or microvascular white matter ischemic change related to longstanding hypertension and/or diabetes.    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  No acute lymphadenitis, granulomas, necrosis, or malignancy identified (see comment).     Comment:  Sections demonstrate preservation of the lymph node architecture with expansion of the paracortical regions comprised of mature polymorphous lymphocytes.  Focal areas of reactive sinus histiocytosis are also seen.  No acute inflammatory infiltrates, granulomas, necrosis, or primary or metastatic malignant neoplastic infiltrates are identified.       Immunophenotyping by flow cytometry demonstrates no evidence of a monotypic B cell population or T cell aberrancy by markers assayed.      Overall, the morphologic and immunophenotypic findings are  consistent with paracortical reactive lymphoid cell hyperplasia.   Clinically, paracortical lymphoid cell hyperplasias are commonly seen in association with viral infections, are occasionally iatrogenically induced hypersensitivity reactions (especially to medications), or may follow vaccinations.       Recommend clinical and radiologic correlation, as well as correlation with pending cultures.     For  purposes, this case was reviewed by a second pathologist who concurred with the diagnosis.      Electronically signed by Parmjit Massey MD on 7/22/2024 at 10:37 AM         Impression:       ICD-10-CM    1. Laryngopharyngeal reflux (LPR)  K21.9       2. Globus sensation  R09.A2           Recommendations:  I would like the patient to restart PPI.  I will send her some lansoprazole to start.  Additionally I will start her on Flonase and azelastine in combination to address her postnasal drip.  She will return to see me in 6 weeks.  If she is not improved we may start oral antihistamine and Singulair.    Thank you for allowing me to participate in the care of your patient.    Jose Wetzel,    Otolaryngology/Rhinology, Sinus, and Endoscopic Skull Base Surgery  [facility]   [address] [place] [address] [address] [place] [address]  Phone [phone]  Fax [phone]  5/28/2024  9:32 AM  4/22/2025

## 2025-04-23 NOTE — PROGRESS NOTES
[facility] - Gastroenterology                                                                                                               Reason for consult: f/u    Requesting physician or provider: Raquel Danielle MD    Chief Complaint   Patient presents with    Rectal Bleeding       HPI:   Triny Head is a 59 year old year-old female with history of TA of colon, GERD, HTN:     she is here today for f/U  Egd 9/2024 w/ Dr. Pina    Final Diagnosis:      A. Gastric biopsy:  Fragments of gastric mucosa with mild inactive chronic inflammation, and lamina propria vascular ectasia.  Diff Quik stain (with appropriate control reactivity) is negative for H pylori microorganisms.     B. Proximal esophagus; biopsies:  Multiple fragments of benign hyperplastic squamous epithelium.   No evidence of eosinophilic esophagitis, goblet cell intestinal metaplasia, dysplasia, or malignancy is identified.       Here today for intermittent, painless brbpr   H/o sx attributed to hemorrhoids  No clots, melena.    she moves her bowels 2-3x/daily. Straining at times. No diarrhea.     she denies acid reflux and/or heartburn controlled on pantoprazole. Issues with swallowing s/p eval with ent with improvement.  she denies abdominal pain. she denies nausea and/or vomiting.  she denies recent change in appetite and/or unintentional weight loss.    Saw ent for laryngitis  had lump removed that was benign     NSAIDS: no  Tobacco: no  Alcohol: occasion  Marijuana: no  Illicit drugs: no     No FH GI malignancy     No history of adverse reaction to sedation  No JONAS  No anticoagulants  No pacemaker/defibrillator  No pain medications and/or sleep aides    EGD/CLN 2020  The etiology of iron deficiency anemia is not endoscopically apparent. EGD shows mild reflux esophagitis in the setting of a small hiatal hernia.  Colonoscopy was incomplete due to  severely tortuous colon. Patient did have a colonoscopy completed in 7/2019 (reached cecum)--->at that time four small polyps removed, ascending colon lipoma, diverticulosis. C-scope repeated this year due to iron deficiency anemia and circumferential luminal narrowing in the ascending colon (on CT A/P imaging), and now unable to reach cecum.   PATH: neg Hpylori     C-scope 7/2023 at [facility] no polyps  5 y crc screening interval advised    Wt Readings from Last 6 Encounters:   04/29/25 217 lb (98.4 kg)   04/28/25 216 lb 4.8 oz (98.1 kg)   04/22/25 217 lb (98.4 kg)   03/21/25 217 lb (98.4 kg)   02/11/25 213 lb 14.4 oz (97 kg)   01/22/25 214 lb 14.4 oz (97.5 kg)        History, Medications, Allergies, ROS:      Past Medical History[1]   Past Surgical History[2]   Family Hx: Family History[3]   Social History: Short Social Hx on File[4]     Medications (Active prior to today's visit):  Current Medications[5]    Allergies:  Allergies[6]    ROS:   CONSTITUTIONAL: negative for fevers, chills, sweats and weight loss  EYES Negative for red eyes, yellow eyes, changes in vision  HEENT: Negative for dysphagia and hoarseness  RESPIRATORY: Negative for cough and shortness of breath  CARDIOVASCULAR: Negative for chest pain, palpitations  GASTROINTESTINAL: See HPI  GENITOURINARY: Negative for dysuria and frequency  MUSCULOSKELETAL: Negative for arthralgias and myalgias  NEUROLOGICAL: Negative for dizziness and headaches  BEHAVIOR/PSYCH: Negative for anxiety and poor appetite    PHYSICAL EXAM:   Blood pressure 149/83, pulse 56, height 5' 3\" (1.6 m), weight 217 lb (98.4 kg).    GEN: WD/WN, NAD  HEENT: Supple symmetrical, trachea midline  CV: RRR, the extremities are warm and well perfused   LUNGS: No increased work of breathing  ABDOMEN: No scars, normal bowel sounds, soft, non-tender, non-distended no rebound or guarding, no masses, no hepatomegaly  MSK: No redness, no warmth, no swelling of joints  SKIN: No jaundice, no erythema,  no rashes  HEMATOLOGIC: No bleeding, no bruising  NEURO: Alert and interactive, normal gait    Labs/Imaging/Procedures:     Patient's pertinent labs and imaging were reviewed and discussed with patient today.        .  ASSESSMENT/PLAN:   Triny Head is a 59 year old year-old female with history of TA of colon, GERD, HTN:     #gerd  Controlled on pantoprazole    #brbpr  #constipation  Issues historically attributed to hemorrhoids. No clots, melena. Has mild constipation.  Last c-scope 2023 at [facility] due to difficult, tortuous colon.  No hemorrhoids noted on procedure. 5 y crc screening interval advised. Not anemic on cbc 4/2025. No fhx gi malignancy. We discussed empirically treating for hemorrhoids, however if symptoms do not improve, will need to consider repeat c-scope vs flexsig to exclude missed polyp, etc. She will f/U at [facility] given history of difficult colon. Plan as below.     -fibercon or citrucel  -anusol twice daily x 2 weeks  -reflux diet modification  -pantoprazole  -referral to [facility] for difficult colon  -contact office and/or consider er if condition decline      Orders This Visit:  No orders of the defined types were placed in this encounter.      Meds This Visit:  Requested Prescriptions     Signed Prescriptions Disp Refills    hydrocortisone 2.5 % External Cream 30 g 0     Sig: Place 1 Application rectally 2 (two) times daily for 14 days. Apply by topical route 2 times every day to the affected area(s)       Imaging & Referrals:  GASTRO - EXTERNAL      Lori Reyes, APRN   4/23/2025        This note was partially prepared using Dragon Medical voice recognition dictation software. As a result, errors may occur. When identified, these errors have been corrected. While every attempt is made to correct errors during dictation, discrepancies may still exist.          [1]   Past Medical History:   Arthritis    Back problem    Bronchitis    Cardiomyopathy (HCC)    \"enlarged heart\" per pt     Essential hypertension    Fibromyalgia    GERD (gastroesophageal reflux disease)    repeat EGD in  due to esophagitis    High blood pressure    Prediabetes    Problems with swallowing    Tubular adenoma of colon    repeat CLN in - sees Dr. Allen at [facility]   [2]   Past Surgical History:  Procedure Laterality Date          Colonoscopy N/A 2019    Procedure: COLONOSCOPY;  Surgeon: SARAH Pina MD;  Location: [facility] ENDOSCOPY    Colonoscopy N/A 2020    Procedure: COLONOSCOPY;  Surgeon: SARAH Pina MD;  Location: [facility] ENDOSCOPY    Colonoscopy      Cyst aspiration right  2014    Hysterectomy      38    Desmond localization wire 1 site right (cpt=19281)      Needle biopsy right      in her 20's    Other surgical history  2024    cervical lymphadenopathy    Total abdom hysterectomy      15 yrs ago   [3]   Family History  Problem Relation Age of Onset    Other (Other) Mother     Heart Disorder Father     Hypertension Father     Prostate Cancer Maternal Grandfather 77    Cancer Maternal Grandfather     Breast Cancer Paternal Aunt         in her 50's    Diabetes Sister    [4]   Social History  Socioeconomic History    Marital status: Single   Tobacco Use    Smoking status: Former     Types: Cigarettes     Start date:      Passive exposure: Past    Smokeless tobacco: Never   Vaping Use    Vaping status: Never Used   Substance and Sexual Activity    Alcohol use: Yes     Alcohol/week: 1.0 standard drink of alcohol     Comment: Maybe once every two other weeks.    Drug use: No    Sexual activity: Not Currently   [5]   Current Outpatient Medications   Medication Sig Dispense Refill    hydrocortisone 2.5 % External Cream Place 1 Application rectally 2 (two) times daily for 14 days. Apply by topical route 2 times every day to the affected area(s) 30 g 0    predniSONE 10 MG Oral Tab Take 30 mg PO daily x 2 days, Take 20 mg PO daily x 2 days, Take 10 mg PO daily x 2 days 12 tablet 0     albuterol 108 (90 Base) MCG/ACT Inhalation Aero Soln Inhale 2 puffs into the lungs every 4 (four) hours as needed for Wheezing or Shortness of Breath. 1 each 0    fluticasone propionate 50 MCG/ACT Nasal Suspension 1 spray by Nasal route 2 (two) times daily. 16 g 3    azelastine 0.1 % Nasal Solution 1 spray by Nasal route 2 (two) times daily. 30 mL 3    Omeprazole 40 MG Oral Capsule Delayed Release Take 1 capsule (40 mg total) by mouth daily. Before a meal 30 capsule 2    losartan 50 MG Oral Tab Take 1 tablet (50 mg total) by mouth daily. 90 tablet 3    hydroCHLOROthiazide 25 MG Oral Tab Take 1 tablet (25 mg total) by mouth daily. 90 tablet 1    atenolol 50 MG Oral Tab Take 1 tablet (50 mg total) by mouth daily. 90 tablet 3    ketoconazole 2 % External Cream APPLY  CREAM TOPICALLY TO AFFECTED AREA TWICE DAILY 30 g 0    Multiple Vitamin (MULTI-VITAMIN) Oral Tab Take 1 tablet by mouth daily.      Aspirin 81 MG Oral Tab Take 1 tablet (81 mg total) by mouth daily.      methocarbamol 750 MG Oral Tab Take 1 tablet (750 mg total) by mouth 3 (three) times daily. No driving while taking medication because of the sedation 60 tablet 0    topiramate 25 MG Oral Tab Take 1 tablet (25 mg total) by mouth 2 (two) times daily. 90 tablet 0   [6]   Allergies  Allergen Reactions    Latex HIVES    Morphine Sulfate ITCHING

## 2025-04-24 ENCOUNTER — APPOINTMENT (OUTPATIENT)
Dept: GENERAL RADIOLOGY | Age: 60
End: 2025-04-24
Attending: STUDENT IN AN ORGANIZED HEALTH CARE EDUCATION/TRAINING PROGRAM
Payer: MEDICAID

## 2025-04-24 ENCOUNTER — NURSE TRIAGE (OUTPATIENT)
Dept: INTERNAL MEDICINE CLINIC | Facility: CLINIC | Age: 60
End: 2025-04-24

## 2025-04-24 ENCOUNTER — HOSPITAL ENCOUNTER (OUTPATIENT)
Age: 60
Discharge: HOME | End: 2025-04-24
Attending: STUDENT IN AN ORGANIZED HEALTH CARE EDUCATION/TRAINING PROGRAM
Payer: MEDICAID

## 2025-04-24 VITALS
OXYGEN SATURATION: 98 % | SYSTOLIC BLOOD PRESSURE: 140 MMHG | RESPIRATION RATE: 20 BRPM | DIASTOLIC BLOOD PRESSURE: 64 MMHG | HEART RATE: 64 BPM | TEMPERATURE: 98 F

## 2025-04-24 DIAGNOSIS — R94.4 DECREASED GFR: ICD-10-CM

## 2025-04-24 DIAGNOSIS — J40 BRONCHITIS WITH WHEEZING: Primary | ICD-10-CM

## 2025-04-24 DIAGNOSIS — R79.89 ELEVATED SERUM CREATININE: ICD-10-CM

## 2025-04-24 DIAGNOSIS — R06.02 SHORTNESS OF BREATH: ICD-10-CM

## 2025-04-24 DIAGNOSIS — R07.81 PLEURITIC CHEST PAIN: ICD-10-CM

## 2025-04-24 LAB
#MXD IC: 0.6 X10ˆ3/UL (ref 0.1–1)
BUN BLD-MCNC: 21 MG/DL (ref 7–18)
CHLORIDE BLD-SCNC: 107 MMOL/L (ref 98–112)
CO2 BLD-SCNC: 24 MMOL/L (ref 21–32)
CREAT BLD-MCNC: 1.3 MG/DL (ref 0.55–1.02)
DDIMER WHOLE BLOOD: 361 NG/ML DDU (ref ?–400)
EGFRCR SERPLBLD CKD-EPI 2021: 47 ML/MIN/1.73M2 (ref 60–?)
GLUCOSE BLD-MCNC: 107 MG/DL (ref 70–99)
HCT VFR BLD AUTO: 41.1 % (ref 35–48)
HCT VFR BLD CALC: 40 % (ref 34–50)
HGB BLD-MCNC: 12.7 G/DL (ref 12–16)
ISTAT IONIZED CALCIUM FOR CHEM 8: 1.16 MMOL/L (ref 1.12–1.32)
LYMPHOCYTES # BLD AUTO: 2.2 X10ˆ3/UL (ref 1–4)
LYMPHOCYTES NFR BLD AUTO: 31.2 %
MCH RBC QN AUTO: 25 PG (ref 26–34)
MCHC RBC AUTO-ENTMCNC: 30.9 G/DL (ref 31–37)
MCV RBC AUTO: 81.1 FL (ref 80–100)
MIXED CELL %: 8.2 %
NEUTROPHILS # BLD AUTO: 4.1 X10ˆ3/UL (ref 1.5–7.7)
NEUTROPHILS NFR BLD AUTO: 60.6 %
PLATELET # BLD AUTO: 227 X10ˆ3/UL (ref 150–450)
POTASSIUM BLD-SCNC: 3.9 MMOL/L (ref 3.6–5.1)
RBC # BLD AUTO: 5.07 X10ˆ6/UL (ref 3.8–5.3)
SODIUM BLD-SCNC: 143 MMOL/L (ref 136–145)
TROPONIN I BLD-MCNC: <0.02 NG/ML (ref ?–0.05)
WBC # BLD AUTO: 6.9 X10ˆ3/UL (ref 4–11)

## 2025-04-24 PROCEDURE — 93010 ELECTROCARDIOGRAM REPORT: CPT

## 2025-04-24 PROCEDURE — 85378 FIBRIN DEGRADE SEMIQUANT: CPT | Performed by: STUDENT IN AN ORGANIZED HEALTH CARE EDUCATION/TRAINING PROGRAM

## 2025-04-24 PROCEDURE — 94640 AIRWAY INHALATION TREATMENT: CPT

## 2025-04-24 PROCEDURE — 99215 OFFICE O/P EST HI 40 MIN: CPT

## 2025-04-24 PROCEDURE — 85025 COMPLETE CBC W/AUTO DIFF WBC: CPT | Performed by: STUDENT IN AN ORGANIZED HEALTH CARE EDUCATION/TRAINING PROGRAM

## 2025-04-24 PROCEDURE — 93005 ELECTROCARDIOGRAM TRACING: CPT

## 2025-04-24 PROCEDURE — 36415 COLL VENOUS BLD VENIPUNCTURE: CPT

## 2025-04-24 PROCEDURE — 80047 BASIC METABLC PNL IONIZED CA: CPT

## 2025-04-24 PROCEDURE — 84484 ASSAY OF TROPONIN QUANT: CPT

## 2025-04-24 PROCEDURE — 71046 X-RAY EXAM CHEST 2 VIEWS: CPT | Performed by: STUDENT IN AN ORGANIZED HEALTH CARE EDUCATION/TRAINING PROGRAM

## 2025-04-24 RX ORDER — IPRATROPIUM BROMIDE AND ALBUTEROL SULFATE 2.5; .5 MG/3ML; MG/3ML
3 SOLUTION RESPIRATORY (INHALATION) ONCE
Status: COMPLETED | OUTPATIENT
Start: 2025-04-24 | End: 2025-04-24

## 2025-04-24 RX ORDER — ALBUTEROL SULFATE 90 UG/1
2 INHALANT RESPIRATORY (INHALATION) EVERY 4 HOURS PRN
Qty: 1 EACH | Refills: 0 | Status: SHIPPED | OUTPATIENT
Start: 2025-04-24 | End: 2025-05-24

## 2025-04-24 NOTE — ED INITIAL ASSESSMENT (HPI)
Pt states the last 2 days when he breathes she hears wheezing. No fever, no sick symptoms. Denies hx of asthma but says 'diana had bronchial problems before where I was given an inhaler' pt reports feeling SOB.

## 2025-04-24 NOTE — DISCHARGE INSTRUCTIONS
Your chest x-ray shows no signs of pneumonia but does show signs concerning for bronchitis which is airway inflammation in the setting of viral infection versus asthma which is airway hyperreactivity, for which we treated you with a nebulizer and for which I have prescribed an albuterol inhaler.  However, airway hyperreactivity can rapidly progress become life-threatening and with any shortness of breath or difficulty breathing, I recommend immediate assessment the emergency department.    Your blood work and EKG showed no sign of heart attack, no sign of anemia, normal electrolytes, and no sign of blood clot.  However, your kidney function has progressed and is mildly worse for which I do recommend follow-up with your primary care physician.

## 2025-04-24 NOTE — ED PROVIDER NOTES
Patient Seen in: [facility] [place]      History     Chief Complaint   Patient presents with    Shortness Of Breath     Stated Complaint: WHEEZING    Subjective:   HPI    59-year-old female with past medical history of HTN and prediabetes, who presents with chronic cough that she reports is worse in the last couple of days with associated shortness of breath and centrally located chest pain also present over the last couple of days with no exacerbating or relieveing factors, she believes she is also hearing wheezing, states she has history of bronchitis, follows up with ENT for nasal drainage and is on 2 nasal sprays.  Per my review of the cardiac nuclear exercise stress test from 4/14/2025, radiology reports normal exercise myocardial perfusion study with normal wall motion and left ventricular ejection fraction.  The EKG portion reported abnormal EKG with 1 to 1.5 mm ST depression at peak exercise which resolved with recovery.    Objective:     No pertinent past medical history.            No pertinent past surgical history.              No pertinent social history.            Review of Systems    Positive for stated complaint: WHEEZING  Other systems are as noted in HPI.  Constitutional and vital signs reviewed.      All other systems reviewed and negative except as noted above.                  Physical Exam     ED Triage Vitals [04/24/25 1609]   /64   Pulse 64   Resp 20   Temp 98.1 °F (36.7 °C)   Temp src Oral   SpO2 98 %   O2 Device None (Room air)       Current Vitals:   Vital Signs  BP: 140/64  Pulse: 64  Resp: 20  Temp: 98.1 °F (36.7 °C)  Temp src: Oral    Oxygen Therapy  SpO2: 98 %  O2 Device: None (Room air)        Physical Exam    General: Awake, alert, comfortable on room air, in no distress, tolerating oral secretions, interactive  Pulmonary: Mildly diminished breath sounds throughout with scattered wheezing, no conversational dyspnea  Neuro: Symmetrical facial expressions on gross  observation  Musculoskeletal: 5/5 B/L UE and LE strength  HEENT: No periorbital edema or erythema, nonerythematous and nonedematous intact B/L TMs, nasal congestion is present, nonerythematous and nonedematous B/L tonsils, no tonsillar exudates, no peritonsillar edema, mild posterior pharyngeal cobblestoning, uvula midline, tolerating oral secretions, normal speech, no submandibular edema  Psych: Normal mood, normal affect      ED Course     Labs Reviewed   POCT CBC - Abnormal; Notable for the following components:       Result Value    MCH IC 25.0 (*)     MCHC IC 30.9 (*)     All other components within normal limits   POCT ISTAT CHEM8 CARTRIDGE - Abnormal; Notable for the following components:    ISTAT BUN 21 (*)     ISTAT Glucose 107 (*)     ISTAT Creatinine 1.30 (*)     eGFR-Cr 47 (*)     All other components within normal limits   D-DIMER (POC) - Normal   ISTAT TROPONIN - Normal     Collected 4/24/2025 16:38       Status: Final result    0 Result Notes      Component  Ref Range & Units 4/24/25 1638   WBC IC  4.0 - 11.0 x10ˆ3/uL 6.9   RBC IC  3.80 - 5.30 X10ˆ6/uL 5.07   HGB IC  12.0 - 16.0 g/dL 12.7   HCT IC  35.0 - 48.0 % 41.1   MCV IC  80.0 - 100.0 fL 81.1   MCH IC  26.0 - 34.0 pg 25.0 Low    MCHC IC  31.0 - 37.0 g/dL 30.9 Low    PLT IC  150.0 - 450.0 X10ˆ3/uL 227.0   # Neutrophil  1.5 - 7.7 X10ˆ3/uL 4.1   # Lymphocyte  1.0 - 4.0 X10ˆ3/uL 2.2   # Mixed Cells  0.1 - 1.0 X10ˆ3/uL 0.6   Neutrophil %  % 60.6   Lymphocyte %  % 31.2   Mixed Cell %  % 8.2   Resulting Agency [place] ([facility])             Specimen Collected: 04/24/25 16:38 Last Resulted: 04/24/25 16:54       Collected 4/24/2025 16:57       Status: Final result    0 Result Notes      Component  Ref Range & Units 4/24/25 1657   ISTAT Sodium  136 - 145 mmol/L 143   ISTAT BUN  7 - 18 mg/dL 21 High    ISTAT Potassium  3.6 - 5.1 mmol/L 3.9   ISTAT Chloride  98 - 112 mmol/L 107   ISTAT Ionized Calcium  1.12 - 1.32 mmol/L 1.16   ISTAT Hematocrit  34 - 50 % 40    ISTAT Glucose  70 - 99 mg/dL 107 High    ISTAT TCO2  21 - 32 mmol/L 24   ISTAT Creatinine  0.55 - 1.02 mg/dL 1.30 High    Comment: This test may exhibit falsely elevated results when a sample is collected from a patient who is presently taking Hydroxyurea. If patient is consuming Hydroxyurea, i-STAT creatinine analysis should be considered inaccurate and a sample should be referred to the Central Lab for analysis, if clinically indicated.   eGFR-Cr  >=60 mL/min/1.73m2 47 Low    Resulting Agency [place] ([facility])             Specimen Collected: 04/24/25 16:57 Last Resulted: 04/24/25 16:58         Collected 4/24/2025 17:07       Status: Final result    0 Result Notes      Component  Ref Range & Units 4/24/25 1707   ISTAT Troponin  <0.045 ng/mL ng/mL <0.02   Resulting Agency [place] ([facility])             Specimen Collected: 04/24/25 17:07 Last Resulted: 04/24/25 17:10       Collected 4/24/2025 16:38       Status: Final result    0 Result Notes      Component  Ref Range & Units 4/24/25 1638   D-Dimer DDU  <400 ng/mL    Resulting Agency [place] ([facility])             Specimen Collected: 04/24/25 16:38 Last Resulted: 04/24/25 17:31       EKG    Rate, intervals and axes as noted on EKG Report.  Rate: HR 60  Rhythm: Normal sinus rhythm with sinus arrhythmia reported  Per my interpretation there is no ST elevation, no STEMI, no ST depression, no appreciated T wave inversion, flattening of baseline in lead aVL which is unchanged when compared to EKGs from 7/11/2024 and 5/7/2021, mild flattening of lead V2 is new           Copy of radiology report of patient's chest x-ray:  Narrative  PROCEDURE: XR CHEST PA + LAT CHEST (CPT=71046)     COMPARISON: [facility] [place] [facility], XR CHEST PA + LAT CHEST (CPT=71046), 1/22/2025, 11:52 AM.     INDICATIONS: Shortness of breath and wheezing x 2-3 days.     TECHNIQUE:   Two views.       FINDINGS:  CARDIAC/VASC: No cardiac silhouette abnormality or cardiomegaly.  Unremarkable  pulmonary vasculature.    MEDIAST/SHAYY: No visible mass or adenopathy.  LUNGS/PLEURA: Normal lung volumes with streaky perihilar markings and areas of centralized peribronchial cuffing and thickening. No pneumothorax. No pleural effusion.    BONES: No fracture or visible bony lesion.  OTHER: Negative.                Impression  CONCLUSION: Prominent perihilar markings, which can be seen with bronchitis or asthma. No focal opacity to suggest pneumonia/infiltrate.        Dictated by (CST): Andrews Daily MD on 4/24/2025 at 5:07 PM      Finalized by (CST): Andrews Daily MD on 4/24/2025 at 5:08 PM              Exam Ended: 04/24/25 16:51 Last Resulted: 04/24/25 17:08         MDM   Patient is awake, alert, comfortable on room air, in no distress, afebrile, she has diminished breath sounds throughout with scattered wheezing consistent with likely viral URI causing bronchitis versus airway hyperreactivity/acute bronchospasm, no reported history of asthma but does have history of bronchitis, no sign of otitis media or otitis externa, no sign of tonsillitis or PTA or deep space infection, she does have nasal congestion present with postnasal drainage, consistent with likely underlying viral infection but given progression of symptoms also consider potential pneumonia and will assess with chest x-ray, patient also reports shortness of breath and chest pain likely due to airway hyperreactivity, but given her history and her symptoms will assess for potential atypical ACS with troponin, and assess for potential PE with D-dimer, assess CBC for anemia, BMP for kidney function and electrolytes, and will treat with DuoNeb for airway hyperreactivity  -Patient's CBC shows normal hemoglobin and no leukocytosis  -Patient's BMP shows creatinine of 1.30 and GFR of 47, normal potassium of 3.9, kidney function has progressed since 1/22/2025 when creatinine was 1.18 and GFR was 53, this was discussed with the patient, did recommend follow-up  with her primary care physician within the week for reassessment and for further recommendations  -Patient's EKG shows no ST elevation, no STEMI, no ST depression, and troponin is less than 0.02 which rules out ACS  -Patient's D-dimer is less than 400, rules out PE  -Per my personal review and interpretation of the patient's chest x-ray there is normal lung expansion with no effusions, and no consolidations, this is consistent with my review of the radiology report, but radiology report also notes signs of bronchitis versus asthma.  This was discussed with the patient.  -Reassessed after DuoNeb and feeling improved, improved airflow throughout with wheezing resolved on repeat assessment/auscultation  -Patient does not have an albuterol inhaler anymore, therefore new prescription sent to the pharmacy, and educated on usage.  We did discuss that airway hyperreactivity can rapidly progress to become life-threatening, and therefore strict ED precautions were discussed  -We did discuss potential steroid prescription, patient reports history of prediabetes, and she did respond well to single duoneb and therefore less inclined to prescribe steroids, she also reports she is afraid of steroids and has not taken previous steroid prescriptions, and declines steroids at this time  -We discussed likely underlying viral infection causing bronchitis I discussed symptomatic management with rest, hydration, and did discuss that viral infections can make her susceptible to secondary bacterial infections, and therefore with any new, changing, or progressing signs or symptoms, she should be immediately reassessed      Medical Decision Making  Amount and/or Complexity of Data Reviewed  External Data Reviewed: labs and ECG.     Details: Patient's cardiac nuclear exercise stress test from 4/14/2025 reviewed, patient CMP from 1/22/2025 reviewed, and reviewed patient's EKGs from 7/11/2024 and 5/7/2021  Radiology: ordered and independent  interpretation performed.    Risk  Prescription drug management.        Disposition and Plan     Clinical Impression:  1. Bronchitis with wheezing    2. Shortness of breath    3. Elevated serum creatinine    4. Decreased GFR    5. Pleuritic chest pain         Disposition:  Discharge  4/24/2025  5:36 pm    Follow-up:  Raquel Danielle MD  [address] [place] [id number] [phone]    In 3 days            Medications Prescribed:  Discharge Medication List as of 4/24/2025  5:38 PM        START taking these medications    Details   albuterol 108 (90 Base) MCG/ACT Inhalation Aero Soln Inhale 2 puffs into the lungs every 4 (four) hours as needed for Wheezing or Shortness of Breath., Normal, Disp-1 each, R-0

## 2025-04-24 NOTE — TELEPHONE ENCOUNTER
Action Requested: Summary for Provider     []  Critical Lab, Recommendations Needed  [] Need Additional Advice  []   FYI    []   Need Orders  [] Need Medications Sent to Pharmacy  []  Other     SUMMARY: sent to  - pt stated she have been wheezing x 2 days, hx -bronchitis, inhaler was prescribed over a year ago, pt agreed to go to , asked for test results -f Echo-Spoke with patient (identified name and ), results reviewed and agrees with plan.      Reason for call: Wheezing  Onset:2 days                    Reason for Disposition   Patient sounds very sick or weak to the triager    Protocols used: Asthma Attack-A-OH

## 2025-04-25 LAB
ATRIAL RATE: 60 BPM
P AXIS: 61 DEGREES
P-R INTERVAL: 160 MS
Q-T INTERVAL: 420 MS
QRS DURATION: 72 MS
QTC CALCULATION (BEZET): 420 MS
R AXIS: 25 DEGREES
T AXIS: 49 DEGREES
VENTRICULAR RATE: 60 BPM

## 2025-04-28 ENCOUNTER — OFFICE VISIT (OUTPATIENT)
Dept: INTERNAL MEDICINE CLINIC | Facility: CLINIC | Age: 60
End: 2025-04-28
Payer: MEDICAID

## 2025-04-28 ENCOUNTER — LAB ENCOUNTER (OUTPATIENT)
Dept: LAB | Age: 60
End: 2025-04-28
Attending: INTERNAL MEDICINE
Payer: MEDICAID

## 2025-04-28 VITALS
SYSTOLIC BLOOD PRESSURE: 136 MMHG | BODY MASS INDEX: 38.33 KG/M2 | DIASTOLIC BLOOD PRESSURE: 89 MMHG | HEART RATE: 57 BPM | HEIGHT: 63 IN | WEIGHT: 216.31 LBS

## 2025-04-28 DIAGNOSIS — R79.89 ELEVATED SERUM CREATININE: ICD-10-CM

## 2025-04-28 DIAGNOSIS — R73.03 PREDIABETES: ICD-10-CM

## 2025-04-28 DIAGNOSIS — I10 PRIMARY HYPERTENSION: ICD-10-CM

## 2025-04-28 DIAGNOSIS — R94.31 EKG ABNORMALITIES: ICD-10-CM

## 2025-04-28 DIAGNOSIS — N18.32 STAGE 3B CHRONIC KIDNEY DISEASE (HCC): ICD-10-CM

## 2025-04-28 DIAGNOSIS — R35.1 NOCTURIA: ICD-10-CM

## 2025-04-28 DIAGNOSIS — R06.09 DYSPNEA ON EXERTION: Primary | ICD-10-CM

## 2025-04-28 DIAGNOSIS — J98.01 BRONCHOSPASM: ICD-10-CM

## 2025-04-28 DIAGNOSIS — R89.9 ABNORMAL LABORATORY TEST: ICD-10-CM

## 2025-04-28 LAB
ALBUMIN SERPL-MCNC: 4.6 G/DL (ref 3.2–4.8)
ALBUMIN/GLOB SERPL: 1.6 {RATIO} (ref 1–2)
ALP LIVER SERPL-CCNC: 120 U/L (ref 50–130)
ALT SERPL-CCNC: 15 U/L (ref 10–49)
ANION GAP SERPL CALC-SCNC: 10 MMOL/L (ref 0–18)
AST SERPL-CCNC: 20 U/L (ref ?–34)
BILIRUB SERPL-MCNC: 0.4 MG/DL (ref 0.2–1.1)
BILIRUB UR QL: NEGATIVE
BUN BLD-MCNC: 22 MG/DL (ref 9–23)
BUN/CREAT SERPL: 18.2 (ref 10–20)
CALCIUM BLD-MCNC: 9.9 MG/DL (ref 8.7–10.4)
CHLORIDE SERPL-SCNC: 101 MMOL/L (ref 98–112)
CLARITY UR: CLEAR
CO2 SERPL-SCNC: 29 MMOL/L (ref 21–32)
CREAT BLD-MCNC: 1.21 MG/DL (ref 0.55–1.02)
EGFRCR SERPLBLD CKD-EPI 2021: 51 ML/MIN/1.73M2 (ref 60–?)
EST. AVERAGE GLUCOSE BLD GHB EST-MCNC: 134 MG/DL (ref 68–126)
FASTING STATUS PATIENT QL REPORTED: YES
GLOBULIN PLAS-MCNC: 2.9 G/DL (ref 2–3.5)
GLUCOSE BLD-MCNC: 92 MG/DL (ref 70–99)
GLUCOSE UR-MCNC: NORMAL MG/DL
HBA1C MFR BLD: 6.3 % (ref ?–5.7)
HGB UR QL STRIP.AUTO: NEGATIVE
KETONES UR-MCNC: NEGATIVE MG/DL
LEUKOCYTE ESTERASE UR QL STRIP.AUTO: NEGATIVE
NITRITE UR QL STRIP.AUTO: NEGATIVE
OSMOLALITY SERPL CALC.SUM OF ELEC: 293 MOSM/KG (ref 275–295)
PH UR: 5.5 [PH] (ref 5–8)
POTASSIUM SERPL-SCNC: 3.9 MMOL/L (ref 3.5–5.1)
PROT SERPL-MCNC: 7.5 G/DL (ref 5.7–8.2)
PROT UR-MCNC: NEGATIVE MG/DL
SODIUM SERPL-SCNC: 140 MMOL/L (ref 136–145)
SP GR UR STRIP: 1.02 (ref 1–1.03)
UROBILINOGEN UR STRIP-ACNC: NORMAL

## 2025-04-28 PROCEDURE — 36415 COLL VENOUS BLD VENIPUNCTURE: CPT

## 2025-04-28 PROCEDURE — 99214 OFFICE O/P EST MOD 30 MIN: CPT | Performed by: INTERNAL MEDICINE

## 2025-04-28 PROCEDURE — 80053 COMPREHEN METABOLIC PANEL: CPT

## 2025-04-28 PROCEDURE — 83036 HEMOGLOBIN GLYCOSYLATED A1C: CPT

## 2025-04-28 PROCEDURE — 81003 URINALYSIS AUTO W/O SCOPE: CPT

## 2025-04-28 RX ORDER — PREDNISONE 10 MG/1
TABLET ORAL
Qty: 12 TABLET | Refills: 0 | Status: SHIPPED | OUTPATIENT
Start: 2025-04-28

## 2025-04-28 NOTE — PROGRESS NOTES
Subjective:     Patient ID: Triny Head is a 59 year old female.  Presents for follow-up on several concerns    She has seen neurologist and will try to complete recommended workup      Patient was seen in the immediate care center for evaluation of the wheezing, she could not lay down without wheezing, she states that wheezing started, after she did stress test, since that time feeling very tired and short of breath when she does ordinary walking this is new development for her.  Albuterol given in the emergency room helps, she still finds herself wheezing when she lays down.  Chest x-ray reviewed possible bronchiolitis.  She has been taking Flonase omeprazole and Astelin prescribed by ENT specialist states it helps with postnasal drainage.  Lately noticed that wakes up at night 3 times to urinate.  At [facility] center she was advised that kidney function test was looking worse ,she is concerned about her kidney function, states that she did not take hydrochlorothiazide for about 2 to 4 weeks prior to visit [facility].  Recent stress test showed normal nuclear part of the testing  , EKG with 1-1.5 ST segment depression regarding the after test completed.  EKG at [facility] showed new septal infarct?  She has seen neurologist and will try to complete recommended workup  Current Medications[1]  Allergies:Allergies[2]    Past Medical History[3]   Past Surgical History[4]   Family History[5]   Social History: Short Social Hx on File[6]     /89 (BP Location: Left arm, Patient Position: Sitting, Cuff Size: adult)   Pulse 57   Ht 5' 3\" (1.6 m)   Wt 216 lb 4.8 oz (98.1 kg)   BMI 38.32 kg/m²    Physical Exam  Constitutional:       Appearance: Normal appearance. She is obese.   Eyes:      Extraocular Movements: Extraocular movements intact.      Pupils: Pupils are equal, round, and reactive to light.   Cardiovascular:      Rate and Rhythm: Normal rate and regular rhythm.      Heart  sounds: No murmur heard.  Pulmonary:      Effort: Pulmonary effort is normal. No respiratory distress.      Breath sounds: No wheezing.   Musculoskeletal:         General: Normal range of motion.      Cervical back: Normal range of motion and neck supple.      Right lower leg: No edema.   Skin:     General: Skin is warm.      Coloration: Skin is not jaundiced.   Neurological:      General: No focal deficit present.      Mental Status: She is alert and oriented to person, place, and time. Mental status is at baseline.   Psychiatric:         Mood and Affect: Mood normal.         Behavior: Behavior normal.         Judgment: Judgment normal.         Assessment & Plan:   1. Elevated serum creatinine we will recheck BMP continue to stay hydrated   2. Bronchospasm treat patient with short course of prednisone asked patient to report in 1 week how she is feeling   3. Stage 3b chronic kidney disease (HCC) advised patient consume up to 64 ounces of liquids a day will recheck BMP, follow-up with nephrology   4. Dyspnea on exertion see cardiology to complete workup   5. Primary hypertension controlled on current medication   6. EKG abnormalities        Orders Placed This Encounter   Procedures    Basic Metabolic Panel (8) [E]       Meds This Visit:  Requested Prescriptions     Signed Prescriptions Disp Refills    predniSONE 10 MG Oral Tab 12 tablet 0     Sig: Take 30 mg PO daily x 2 days, Take 20 mg PO daily x 2 days, Take 10 mg PO daily x 2 days       Imaging & Referrals:  NEPHROLOGY - INTERNAL  [facility] CARDIOLOGY EXTERNAL            [1]   Current Outpatient Medications   Medication Sig Dispense Refill    predniSONE 10 MG Oral Tab Take 30 mg PO daily x 2 days, Take 20 mg PO daily x 2 days, Take 10 mg PO daily x 2 days 12 tablet 0    albuterol 108 (90 Base) MCG/ACT Inhalation Aero Soln Inhale 2 puffs into the lungs every 4 (four) hours as needed for Wheezing or Shortness of Breath. 1 each 0    fluticasone propionate  50 MCG/ACT Nasal Suspension 1 spray by Nasal route 2 (two) times daily. 16 g 3    azelastine 0.1 % Nasal Solution 1 spray by Nasal route 2 (two) times daily. 30 mL 3    Omeprazole 40 MG Oral Capsule Delayed Release Take 1 capsule (40 mg total) by mouth daily. Before a meal 30 capsule 2    losartan 50 MG Oral Tab Take 1 tablet (50 mg total) by mouth daily. 90 tablet 3    hydroCHLOROthiazide 25 MG Oral Tab Take 1 tablet (25 mg total) by mouth daily. 90 tablet 1    atenolol 50 MG Oral Tab Take 1 tablet (50 mg total) by mouth daily. 90 tablet 3    ketoconazole 2 % External Cream APPLY  CREAM TOPICALLY TO AFFECTED AREA TWICE DAILY 30 g 0    Multiple Vitamin (MULTI-VITAMIN) Oral Tab Take 1 tablet by mouth daily.      Aspirin 81 MG Oral Tab Take 1 tablet (81 mg total) by mouth daily.      methocarbamol 750 MG Oral Tab Take 1 tablet (750 mg total) by mouth 3 (three) times daily. No driving while taking medication because of the sedation 60 tablet 0    topiramate 25 MG Oral Tab Take 1 tablet (25 mg total) by mouth 2 (two) times daily. 90 tablet 0   [2]   Allergies  Allergen Reactions    Latex HIVES    Morphine Sulfate ITCHING   [3]   Past Medical History:   Arthritis    Back problem    Bronchitis    Cardiomyopathy (HCC)    \"enlarged heart\" per pt    Essential hypertension    Fibromyalgia    GERD (gastroesophageal reflux disease)    repeat EGD in  due to esophagitis    High blood pressure    Prediabetes    Problems with swallowing    Tubular adenoma of colon    repeat CLN in - sees Dr. Allen at [facility]   [4]   Past Surgical History:  Procedure Laterality Date          Colonoscopy N/A 2019    Procedure: COLONOSCOPY;  Surgeon: SARAH Pina MD;  Location: [facility] ENDOSCOPY    Colonoscopy N/A 2020    Procedure: COLONOSCOPY;  Surgeon: SARAH Pina MD;  Location: [facility] ENDOSCOPY    Colonoscopy      Cyst aspiration right  2014    Hysterectomy      38    Desmond localization wire 1 site right  ([id number])  2005    Needle biopsy right      in her 20's    Other surgical history  07/19/2024    cervical lymphadenopathy    Total abdom hysterectomy      15 yrs ago   [5]   Family History  Problem Relation Age of Onset    Other (Other) Mother     Heart Disorder Father     Hypertension Father     Prostate Cancer Maternal Grandfather 77    Cancer Maternal Grandfather     Breast Cancer Paternal Aunt         in her 50's    Diabetes Sister    [6]   Social History  Socioeconomic History    Marital status: Single   Tobacco Use    Smoking status: Former     Types: Cigarettes     Start date: 1979     Passive exposure: Past    Smokeless tobacco: Never   Vaping Use    Vaping status: Never Used   Substance and Sexual Activity    Alcohol use: Yes     Alcohol/week: 1.0 standard drink of alcohol     Comment: Maybe once every two other weeks.    Drug use: No    Sexual activity: Not Currently

## 2025-04-29 ENCOUNTER — OFFICE VISIT (OUTPATIENT)
Facility: CLINIC | Age: 60
End: 2025-04-29
Payer: MEDICAID

## 2025-04-29 ENCOUNTER — TELEPHONE (OUTPATIENT)
Facility: CLINIC | Age: 60
End: 2025-04-29

## 2025-04-29 VITALS
HEIGHT: 63 IN | BODY MASS INDEX: 38.45 KG/M2 | SYSTOLIC BLOOD PRESSURE: 149 MMHG | HEART RATE: 56 BPM | DIASTOLIC BLOOD PRESSURE: 83 MMHG | WEIGHT: 217 LBS

## 2025-04-29 DIAGNOSIS — K62.5 BRBPR (BRIGHT RED BLOOD PER RECTUM): Primary | ICD-10-CM

## 2025-04-29 DIAGNOSIS — K59.00 CONSTIPATION, UNSPECIFIED CONSTIPATION TYPE: ICD-10-CM

## 2025-04-29 DIAGNOSIS — K21.9 GASTROESOPHAGEAL REFLUX DISEASE, UNSPECIFIED WHETHER ESOPHAGITIS PRESENT: ICD-10-CM

## 2025-04-29 PROCEDURE — 99215 OFFICE O/P EST HI 40 MIN: CPT | Performed by: NURSE PRACTITIONER

## 2025-04-29 RX ORDER — HYDROCORTISONE 25 MG/G
1 CREAM TOPICAL 2 TIMES DAILY
Qty: 30 G | Refills: 0 | Status: SHIPPED | OUTPATIENT
Start: 2025-04-29 | End: 2025-05-13

## 2025-04-29 NOTE — TELEPHONE ENCOUNTER
Requested documents (referral, clinic notes, tests/labs) faxed to [facility] at [phone]    Confirmation fax received    Patient mycharted

## 2025-04-29 NOTE — PATIENT INSTRUCTIONS
-fibercon or citrucel  -anusol twice daily x 2 weeks  -reflux diet modification  -pantoprazole  -referral to [facility] for difficult colon  -contact office and/or consider er if condition decline    Tips to Control Acid Reflux    To control acid reflux, you’ll need to make some basic diet and lifestyle changes. The simple steps outlined below may be all you’ll need to ease discomfort.   Watch what you eat  Don't have fatty foods or spicy foods.  Eat fewer acidic foods, such as citrus and tomato-based foods. These can increase symptoms.  Limit drinking alcohol, caffeine, and fizzy beverages. All increase acid reflux.  Try limiting chocolate, peppermint, and spearmint. These can make acid reflux worse in some people.     Watch when you eat  Don't lie down for 3 hours after eating.  Don't snack before going to bed.     Raise your head  Raising your head and upper body by 4 to 6 inches helps limit reflux when you’re lying down. Put blocks under the head of your bed frame or a wedge under your mattress to raise it.   Other changes  Lose weight, if you need to  Don’t exercise near bedtime  Don't wear tight-fitting clothes  Limit aspirin and ibuprofen  Stop smoking     [employer] last reviewed this educational content on 6/1/2019  © 9594-5324 [employer], [employer]. [address] [facility], [place] [address]. All rights reserved. This information is not intended as a substitute for professional medical care. Always follow your healthcare professional's instructions.

## 2025-04-29 NOTE — TELEPHONE ENCOUNTER
Patient calling states referral provided for GI in [facility] they are requesting further information to be sent via fax prior to scheduling patient. They need referral, office visit notes, any test results and labs and medication list. Please call.     Fax: [phone]

## 2025-05-02 ENCOUNTER — TELEPHONE (OUTPATIENT)
Age: 60
End: 2025-05-02

## 2025-05-12 RX ORDER — HYDROCHLOROTHIAZIDE 25 MG/1
25 TABLET ORAL DAILY
Qty: 90 TABLET | Refills: 3 | Status: SHIPPED | OUTPATIENT
Start: 2025-05-12

## 2025-05-15 ENCOUNTER — PROCEDURE VISIT (OUTPATIENT)
Dept: NEUROLOGY | Facility: CLINIC | Age: 60
End: 2025-05-15
Payer: MEDICAID

## 2025-05-15 DIAGNOSIS — R20.0 NUMBNESS AND TINGLING OF BOTH LEGS: Primary | ICD-10-CM

## 2025-05-15 DIAGNOSIS — G56.01 CARPAL TUNNEL SYNDROME OF RIGHT WRIST: ICD-10-CM

## 2025-05-15 DIAGNOSIS — R20.2 NUMBNESS AND TINGLING OF BOTH LEGS: Primary | ICD-10-CM

## 2025-05-15 NOTE — PROCEDURES
HISTORY:    Triny Head is a 59 year old female with a complaint of numbness in the right hand.  Sometimes twitching in the bicep muscles.    PHYSICAL:    Cranial nerves grossly normal. Motor examination showed strength to be 5 of 5 throughout.     Sensory examination showed no abnormalities of pin prick or light touch, except reduced sensation along median nerve distribution.     Reflexes were 2 of 4 at the biceps, triceps, brachioradialis, quads and soleus muscle tendons.  Plantar responses were flexor bilaterally.    TECHNICAL SUMMARY:    There were no significant technical difficulty encountered during this study.  Nerve conduction studies were performed without warming.    SUMMARY:    The motor conduction test was normal in all 4 of the tested nerves: R Median - APB, L Median - APB, R Ulnar - ADM, L Ulnar - ADM.    The sensory conduction test was performed on 4 nerve(s). The results were normal in 2 nerve(s): R Median - DigII UlnarV, L Median - DigII UlnarV. Results outside the specified normal range were found in 2 nerve(s), as follows:  In the R Median - Ulnar Palmar study  the peak latency was increased for Median stimulation  the peak latency difference was increased for Median - Wrist segment  In the L Median - Ulnar Palmar study  the peak latency difference was increased for Median - Wrist segment    The needle EMG study was normal in all 14 tested muscles: L. Deltoid, R. Deltoid, L. Biceps brachii, R. Biceps brachii, L. Triceps brachii, R. Triceps brachii, L. Extensor digitorum communis, R. Extensor digitorum communis, L. Pronator teres, R. Pronator teres, L. First dorsal interosseous, R. First dorsal interosseous, L. Abductor pollicis brevis, R. Abductor pollicis brevis.        Motor NCS      Nerve / Sites Muscle Latency Amplitude Rel Amp Durat Segments Distance Lat Diff Velocity     ms mV % ms  cm ms m/s   R Median - APB      Wrist APB 3.90 7.8 100 6.04 Wrist - APB 8  21      Elbow APB 7.33 7.8  100 6.23 Elbow - Wrist 21 3.44 61   L Median - APB      Wrist APB 2.96 7.7 100 5.65 Wrist - APB 8  27      Elbow APB 6.62 7.0 91 5.83 Elbow - Wrist 23 3.67 63   R Ulnar - ADM      Wrist ADM 2.35 13.4 100 4.69 Wrist - ADM         B.Elbow ADM 5.65 12.4 92.6 4.96 B.Elbow - Wrist 24 3.29 73      A.Elbow ADM 7.35 10.8 87.1 5.04 A.Elbow - B.Elbow 10 1.71 59   L Ulnar - ADM      Wrist ADM 2.35 11.4 100 4.58 Wrist - ADM         B.Elbow ADM 5.38 10.7 93.6 4.92 B.Elbow - Wrist 20 3.02 66      A.Elbow ADM 7.06 10.0 93.3 5.44 A.Elbow - B.Elbow 9 1.69 53       Sensory NCS      Nerve / Sites Rec. Site Onset Lat Peak Lat NP Amp PP Amp Segments Peak Diff     ms ms µV µV  ms   R Median - DigII UlnarV      Median Wrist Dig II 3.50 4.19 14.3 37.1 Median Wrist - Dig II       Ulnar Wrist Dig V 2.19 3.02 33.5 80.5 Ulnar Wrist - Median Wrist -1.17   L Median - DigII UlnarV      Median Wrist Dig II 2.12 3.08 32.3 61.4 Median Wrist - Dig II       Ulnar Wrist Dig V 2.56 3.27 26.2 67.0 Ulnar Wrist - Median Wrist 0.19       Sensory NCS - CTS      Nerve / Sites Rec. Site Onset Lat Peak Lat NP Amp PP Amp Segments Dist Peak Diff Velocity     ms ms µV µV  cm ms m/s   R Median - Ulnar Palmar      Median Wrist 2.15 2.73 46.3 36.3 Median - Wrist 8  37.3      Ulnar Wrist 1.04 1.90 17.5 19.1 Ulnar - Median  -0.833    L Median - Ulnar Palmar      Median Wrist 1.40 1.88 88.2 99.2 Median - Wrist 8  57.3      Ulnar Wrist 1.06 1.65 19.4 4.6 Ulnar - Median  -0.229        EMG Summary Table     Spontaneous MUAP Recruitment   Muscle Nerve Roots IA Fib PSW Fasc H.F. Comments Amp Dur. PPP Pattern   L. Deltoid Axillary C5-C6 N None None None None Normal N N N N   R. Deltoid Axillary C5-C6 N None None None None Normal N N N N   L. Biceps brachii Musculocutaneous C5-C6 N None None None None Normal N N N N   R. Biceps brachii Musculocutaneous C5-C6 N None None None None Normal N N N N   L. Triceps brachii Radial C6-C8 N None None None None Normal N N N N   R. Triceps  brachii Radial C6-C8 N None None None None Normal N N N N   L. Extensor digitorum communis Radial C7-C8 N None None None None Normal N N N N   R. Extensor digitorum communis Radial C7-C8 N None None None None Normal N N N N   L. Pronator teres Median C6-C7 N None None None None Normal N N N N   R. Pronator teres Median C6-C7 N None None None None Normal N N N N   L. First dorsal interosseous Ulnar C8-T1 N None None None None Normal N N N N   R. First dorsal interosseous Ulnar C8-T1 N None None None None Normal N N N N   L. Abductor pollicis brevis Median C8-T1 N None None None None Normal N N N N   R. Abductor pollicis brevis Median C8-T1 N None None None None Normal N N N N                                CONCLUSION:    There is electrodiagnostic evidence of a moderate (on the right) sensory, primarily demyelinating, median mononeuropathy at right wrist (carpal tunnel syndrome).    There is no electrodiagnostic evidence of another mononeuropathy (such as ulnar  mononeuropathy), peripheral polyneuropathy or cervical radiculopathy.    CLINICAL CORRELATION:    These abnormal findings are consistent with the patient's clinical complaints on peripheral nerve or muscle basis.  Patient will be referred to hand surgeon.    Aldo De La Rosa MD

## 2025-05-20 ENCOUNTER — TELEPHONE (OUTPATIENT)
Dept: INTERNAL MEDICINE CLINIC | Facility: CLINIC | Age: 60
End: 2025-05-20

## 2025-05-20 NOTE — TELEPHONE ENCOUNTER
Patient called to schedule follow up appointments with Dr. Danielle. Dr. Danielle is out of the office and no available appointment until June. Patient assisted with scheduling sooner appointment.    Patient states when she lies flat she still does experience shortness of breath for 1 second. She has scheduled with cardiology and pulmonology.

## 2025-05-27 ENCOUNTER — HOSPITAL ENCOUNTER (OUTPATIENT)
Dept: ULTRASOUND IMAGING | Facility: HOSPITAL | Age: 60
Discharge: HOME | End: 2025-05-27
Attending: INTERNAL MEDICINE
Payer: MEDICAID

## 2025-05-27 ENCOUNTER — OFFICE VISIT (OUTPATIENT)
Dept: INTERNAL MEDICINE CLINIC | Facility: CLINIC | Age: 60
End: 2025-05-27
Payer: MEDICAID

## 2025-05-27 VITALS
WEIGHT: 217 LBS | HEIGHT: 63 IN | HEART RATE: 63 BPM | SYSTOLIC BLOOD PRESSURE: 151 MMHG | BODY MASS INDEX: 38.45 KG/M2 | DIASTOLIC BLOOD PRESSURE: 87 MMHG

## 2025-05-27 DIAGNOSIS — J20.9 ACUTE BRONCHITIS, UNSPECIFIED ORGANISM: Primary | ICD-10-CM

## 2025-05-27 DIAGNOSIS — R25.2 LEG CRAMPING: ICD-10-CM

## 2025-05-27 PROCEDURE — 93923 UPR/LXTR ART STDY 3+ LVLS: CPT | Performed by: INTERNAL MEDICINE

## 2025-05-27 PROCEDURE — 99212 OFFICE O/P EST SF 10 MIN: CPT | Performed by: INTERNAL MEDICINE

## 2025-05-27 NOTE — PROGRESS NOTES
Triny Head is a 59 year old female.  Chief Complaint   Patient presents with    Follow - Up     HPI:       The following individual(s) verbally consented to be recorded using ambient AI listening technology and understand that they can each withdraw their consent to this listening technology at any point by asking the clinician to turn off or pause the recording:    Patient name: Triny Head    History of Present Illness  Triny Head is a 59 year old female who presents with shortness of breath following a stress test.    She has been experiencing shortness of breath since undergoing a stress test last month. The dyspnea is particularly noticeable at night when lying supine, causing her to awaken abruptly from sleep. She was initially prescribed an inhaler, which she used until three days ago. Since discontinuing the inhaler, her sleep has improved without episodes of nocturnal dyspnea, although she still experiences mild wheezing.    Initially, the wheezing was more pronounced, prompting a visit to urgent care where she was prescribed the inhaler. A chest x-ray performed in the emergency room indicated possible bronchitis. She was given prednisone, which she believes has alleviated both the bronchitis and her breathing issues.    She has an upcoming appointment with a cardiologist and an ENT specialist. She is currently using a nasal spray, which she feels is helping with mucus in her throat. No fluid in her lungs or lymphadenopathy was noted on the chest x-ray.       Current Medications[1]   Past Medical History[2]   Past Surgical History[3]   Social History:  Short Social Hx on File[4]   Family History[5]   Allergies[6]     REVIEW OF SYSTEMS:   Review of Systems   Review of Systems   Constitutional: Negative for activity change, appetite change and fever.   HENT: Negative for congestion and voice change.    Respiratory: Negative for cough and shortness of breath.    Cardiovascular: Negative for  chest pain.   Gastrointestinal: Negative for abdominal distention, abdominal pain and vomiting.   Genitourinary: Negative for hematuria.   Skin: Negative for wound.   Psychiatric/Behavioral: Negative for behavioral problems.   Wt Readings from Last 5 Encounters:   05/27/25 217 lb (98.4 kg)   04/29/25 217 lb (98.4 kg)   04/28/25 216 lb 4.8 oz (98.1 kg)   04/22/25 217 lb (98.4 kg)   03/21/25 217 lb (98.4 kg)     Body mass index is 38.44 kg/m².      EXAM:   /87   Pulse 63   Ht 5' 3\" (1.6 m)   Wt 217 lb (98.4 kg)   BMI 38.44 kg/m²   Physical Exam   Constitutional:       Appearance: Normal appearance.   HENT:      Head: Normocephalic.   Eyes:      Conjunctiva/sclera: Conjunctivae normal.   Breast:  Normal bilateral breast exam. No palpable masses or nodules.   No nipples asymmetry or discharge. No skin changes   Cardiovascular:      Rate and Rhythm: Normal rate and regular rhythm.      Heart sounds: Normal heart sounds. No murmur heard.  Pulmonary:      Effort: Pulmonary effort is normal.      Breath sounds: Normal breath sounds. No rhonchi or rales.   Abdominal:      General: Bowel sounds are normal.      Palpations: Abdomen is soft.      Tenderness: There is no abdominal tenderness.   Musculoskeletal:      Cervical back: Neck supple.      Right lower leg: No edema.      Left lower leg: No edema.   Skin:     General: Skin is warm and dry.   Neurological:      General: No focal deficit present.      Mental Status: He is alert and oriented to person, place, and time. Mental status is at baseline.   Psychiatric:         Mood and Affect: Mood normal.         Behavior: Behavior normal.       ASSESSMENT AND PLAN:   Assessment & Plan     Assessment & Plan  Acute Bronchitis  Bronchitis with possible inflammation on chest x-ray. Symptoms include wheezing and nocturnal dyspnea. Improvement after discontinuation of inhaler and completion of prednisone course. Lungs currently clear with no wheezing or fluid on  examination.  - ok Discontinue inhaler if symptoms have improved.  - No further steroids needed at this time.  - Continue nasal spray for relief.  - has an appointment with ent as well as pulm scheduled    Shortness of breath  Nocturnal dyspnea previously associated with inhaler use. Symptoms improved after stopping inhaler. No pulmonary edema on chest x-ray. Normal myocardial perfusion study with EF of 69%.  - Follow up with cardiologist for further cardiac evaluation.  - Attend pulmonology appointment in October; consider rescheduling earlier if symptoms persist.           The patient indicates understanding of these issues and agrees to the plan.      Gertrude Smith MD     This note was created by Dragon voice recognition. Errors in content may be related to improper recognition by the system; efforts to review and correct have been done but errors may still exist. Please be advised the primary purpose of this note is for me to communicate medical care. Standard sentence structure is not always used. Medical terminology and medical abbreviations may be used. There may be grammatical, typographical, and automated fill ins that may have errors missed in proofreading.        [1]   Current Outpatient Medications   Medication Sig Dispense Refill    hydroCHLOROthiazide 25 MG Oral Tab Take 1 tablet (25 mg total) by mouth daily. 90 tablet 3    fluticasone propionate 50 MCG/ACT Nasal Suspension 1 spray by Nasal route 2 (two) times daily. 16 g 3    azelastine 0.1 % Nasal Solution 1 spray by Nasal route 2 (two) times daily. 30 mL 3    Omeprazole 40 MG Oral Capsule Delayed Release Take 1 capsule (40 mg total) by mouth daily. Before a meal 30 capsule 2    losartan 50 MG Oral Tab Take 1 tablet (50 mg total) by mouth daily. 90 tablet 3    atenolol 50 MG Oral Tab Take 1 tablet (50 mg total) by mouth daily. 90 tablet 3    ketoconazole 2 % External Cream APPLY  CREAM TOPICALLY TO AFFECTED AREA TWICE DAILY 30 g 0    Multiple  Vitamin (MULTI-VITAMIN) Oral Tab Take 1 tablet by mouth daily.      Aspirin 81 MG Oral Tab Take 1 tablet (81 mg total) by mouth daily.      predniSONE 10 MG Oral Tab Take 30 mg PO daily x 2 days, Take 20 mg PO daily x 2 days, Take 10 mg PO daily x 2 days (Patient not taking: Reported on 2025) 12 tablet 0    methocarbamol 750 MG Oral Tab Take 1 tablet (750 mg total) by mouth 3 (three) times daily. No driving while taking medication because of the sedation (Patient not taking: Reported on 2025) 60 tablet 0    topiramate 25 MG Oral Tab Take 1 tablet (25 mg total) by mouth 2 (two) times daily. (Patient not taking: Reported on 2025) 90 tablet 0   [2]   Past Medical History:   Arthritis    Back problem    Bronchitis    Cardiomyopathy (HCC)    \"enlarged heart\" per pt    Essential hypertension    Fibromyalgia    GERD (gastroesophageal reflux disease)    repeat EGD in  due to esophagitis    High blood pressure    Prediabetes    Problems with swallowing    Tubular adenoma of colon    repeat CLN in - sees Dr. Allen at [facility]   [3]   Past Surgical History:  Procedure Laterality Date          Colonoscopy N/A 2019    Procedure: COLONOSCOPY;  Surgeon: SARAH Pina MD;  Location: [facility] ENDOSCOPY    Colonoscopy N/A 2020    Procedure: COLONOSCOPY;  Surgeon: SARAH Pina MD;  Location: [facility] ENDOSCOPY    Colonoscopy      Cyst aspiration right      Hysterectomy      38    Desmond localization wire 1 site right (cpt=19281)      Needle biopsy right      in her 20's    Other surgical history  2024    cervical lymphadenopathy    Total abdom hysterectomy      15 yrs ago   [4]   Social History  Socioeconomic History    Marital status: Single   Tobacco Use    Smoking status: Former     Types: Cigarettes     Start date:      Passive exposure: Past    Smokeless tobacco: Never   Vaping Use    Vaping status: Never Used   Substance and Sexual Activity    Alcohol use: Yes      Alcohol/week: 1.0 standard drink of alcohol     Comment: Maybe once every two other weeks.    Drug use: No    Sexual activity: Not Currently   [5]   Family History  Problem Relation Age of Onset    Other (Other) Mother     Heart Disorder Father     Hypertension Father     Prostate Cancer Maternal Grandfather 77    Cancer Maternal Grandfather     Breast Cancer Paternal Aunt         in her 50's    Diabetes Sister    [6]   Allergies  Allergen Reactions    Latex HIVES    Morphine Sulfate ITCHING

## 2025-06-03 ENCOUNTER — OFFICE VISIT (OUTPATIENT)
Facility: CLINIC | Age: 60
End: 2025-06-03
Payer: MEDICAID

## 2025-06-03 VITALS
SYSTOLIC BLOOD PRESSURE: 146 MMHG | HEART RATE: 59 BPM | DIASTOLIC BLOOD PRESSURE: 73 MMHG | WEIGHT: 216.13 LBS | HEIGHT: 63 IN | BODY MASS INDEX: 38.29 KG/M2

## 2025-06-03 DIAGNOSIS — K21.9 GASTROESOPHAGEAL REFLUX DISEASE, UNSPECIFIED WHETHER ESOPHAGITIS PRESENT: ICD-10-CM

## 2025-06-03 DIAGNOSIS — K63.89 COLONIC MASS: Primary | ICD-10-CM

## 2025-06-03 PROCEDURE — 99214 OFFICE O/P EST MOD 30 MIN: CPT | Performed by: NURSE PRACTITIONER

## 2025-06-03 NOTE — PROGRESS NOTES
[facility] - Gastroenterology                                                                                                               Reason for consult: f/u    Requesting physician or provider: Raquel Danielle MD    Chief Complaint   Patient presents with    Follow - Up       HPI:   Triny Head is a 59 year old year-old female with history of  TA of colon, GERD, HTN:     she is here today for f/U  Reported brbpr    colonosocpy last week at [facility].   5 cm polypoid lesion in right colon.   Unresectable via colonoscopy per GI and internventional GI at [facility].   Appears to be a lipoma, but also with mucosal changes associated.   PATH    FINAL DIAGNOSIS   ASCENDING COLON POLYP AT 70 CM BIOPSY:   -COLONIC MUCOSA WITH HYPERPLASTIC CHANGES AND LAMINA PROPRIA FIBROSIS    Saw Dr. Peters  Planning for     CT ch/abd/pel  PAT  Labs  Robotic right colectomy  suprep  Impact    she moves her bowels 2-3x daily. No brbpr since c-scope. No melena.     Heartburn controlled on omeprazole.  she denies dysphagia, odynophagia and/or globus. she denies abdominal pain. occasional nausea. No vomiting.  she denies recent change in appetite and/or unintentional weight loss.    NSAIDS: no  Tobacco: no  Alcohol: occasion  Marijuana: no  Illicit drugs: no     No FH GI malignancy     No history of adverse reaction to sedation  No JONAS  No anticoagulants  No pacemaker/defibrillator  No pain medications and/or sleep aides    EGD/CLN 2020  The etiology of iron deficiency anemia is not endoscopically apparent. EGD shows mild reflux esophagitis in the setting of a small hiatal hernia.  Colonoscopy was incomplete due to severely tortuous colon. Patient did have a colonoscopy completed in 7/2019 (reached cecum)--->at that time four small polyps removed, ascending colon lipoma, diverticulosis. C-scope repeated this year due to iron deficiency anemia and circumferential luminal narrowing in the ascending  colon (on CT A/P imaging), and now unable to reach cecum.   PATH: neg Hpylori     C-scope 7/2023 at [facility] no polyps  5 y crc screening interval advised    Egd 9/2024 w/ Dr. Pina     Final Diagnosis:      A. Gastric biopsy:  Fragments of gastric mucosa with mild inactive chronic inflammation, and lamina propria vascular ectasia.  Diff Quik stain (with appropriate control reactivity) is negative for H pylori microorganisms.     B. Proximal esophagus; biopsies:  Multiple fragments of benign hyperplastic squamous epithelium.   No evidence of eosinophilic esophagitis, goblet cell intestinal metaplasia, dysplasia, or malignancy is identified.       Wt Readings from Last 6 Encounters:   06/03/25 216 lb 1.6 oz (98 kg)   05/27/25 217 lb (98.4 kg)   04/29/25 217 lb (98.4 kg)   04/28/25 216 lb 4.8 oz (98.1 kg)   04/22/25 217 lb (98.4 kg)   03/21/25 217 lb (98.4 kg)        History, Medications, Allergies, ROS:      Past Medical History[1]   Past Surgical History[2]   Family Hx: Family History[3]   Social History: Short Social Hx on File[4]     Medications (Active prior to today's visit):  Current Medications[5]    Allergies:  Allergies[6]    ROS:   CONSTITUTIONAL: negative for fevers, chills, sweats and weight loss  EYES Negative for red eyes, yellow eyes, changes in vision  HEENT: Negative for dysphagia and hoarseness  RESPIRATORY: Negative for cough and shortness of breath  CARDIOVASCULAR: Negative for chest pain, palpitations  GASTROINTESTINAL: See HPI  GENITOURINARY: Negative for dysuria and frequency  MUSCULOSKELETAL: Negative for arthralgias and myalgias  NEUROLOGICAL: Negative for dizziness and headaches  BEHAVIOR/PSYCH: Negative for anxiety and poor appetite    PHYSICAL EXAM:   Blood pressure 146/73, pulse 59, height 5' 3\" (1.6 m), weight 216 lb 1.6 oz (98 kg).    GEN: WD/WN, NAD  HEENT: Supple symmetrical, trachea midline  CV: RRR, the extremities are warm and well perfused   LUNGS: No increased work of  breathing  ABDOMEN: No scars, normal bowel sounds, soft, non-tender, non-distended no rebound or guarding, no masses, no hepatomegaly  MSK: No redness, no warmth, no swelling of joints  SKIN: No jaundice, no erythema, no rashes  HEMATOLOGIC: No bleeding, no bruising  NEURO: Alert and interactive, normal gait    Labs/Imaging/Procedures:     Patient's pertinent labs and imaging were reviewed and discussed with patient today.        .  ASSESSMENT/PLAN:   Triny Head is a 59 year old year-old female with history of  TA of colon, GERD, HTN:     #gerd  Controlled on pantoprazole    #right sided mass  5 cm colon mass found in ascending colon found on on recent c-scope.  Suspected to be lipoma w/ some mucosal changes. Unresectable per GI interventional Gi at [facility]. Path COLONIC MUCOSA WITH HYPERPLASTIC CHANGES AND LAMINA PROPRIA FIBROSIS.  Planning ct c/a/p and colon resection.  She is asymptomatic today.  She would like 2nd opinon. Plan as below:    -reflux diet modification  -continue lowest effective dose of omeprazole  -follow with colorectal surgery    Dr. Peters of Dr. Bella [phone]    Orders This Visit:  No orders of the defined types were placed in this encounter.      Meds This Visit:  Requested Prescriptions      No prescriptions requested or ordered in this encounter       Imaging & Referrals:  None      Lori Reyes, APRN   6/3/2025        This note was partially prepared using Dragon Medical voice recognition dictation software. As a result, errors may occur. When identified, these errors have been corrected. While every attempt is made to correct errors during dictation, discrepancies may still exist.          [1]   Past Medical History:   Arthritis    Back problem    Bronchitis    Cardiomyopathy (HCC)    \"enlarged heart\" per pt    Essential hypertension    Fibromyalgia    GERD (gastroesophageal reflux disease)    repeat EGD in 2020 due to esophagitis    High blood pressure    Prediabetes     Problems with swallowing    Tubular adenoma of colon    repeat CLN in - sees Dr. Allen at [facility]   [2]   Past Surgical History:  Procedure Laterality Date          Colonoscopy N/A 2019    Procedure: COLONOSCOPY;  Surgeon: SARAH Pina MD;  Location: [facility] ENDOSCOPY    Colonoscopy N/A 2020    Procedure: COLONOSCOPY;  Surgeon: SARAH Pina MD;  Location: [facility] ENDOSCOPY    Colonoscopy      Cyst aspiration right      Cyst removal      Hysterectomy      38    Desmond localization wire 1 site right (cpt=19281)      Needle biopsy right      in her 20's    Oophorectomy      Other surgical history  2024    cervical lymphadenopathy    Total abdom hysterectomy      15 yrs ago   [3]   Family History  Problem Relation Age of Onset    Other (Other) Mother     Heart Disorder Father     Hypertension Father     Heart Attack Father     Prostate Cancer Maternal Grandfather 77    Cancer Maternal Grandfather     Breast Cancer Paternal Aunt         She is good now.    Diabetes Sister     Diabetes Maternal Grandmother         My sister has Diabetes   [4]   Social History  Socioeconomic History    Marital status: Single   Tobacco Use    Smoking status: Former     Types: Cigarettes     Start date:      Passive exposure: Past    Smokeless tobacco: Never   Vaping Use    Vaping status: Never Used   Substance and Sexual Activity    Alcohol use: Yes     Alcohol/week: 1.0 standard drink of alcohol     Types: 1 Cans of beer per week     Comment: Maybe once other week.    Drug use: No    Sexual activity: Not Currently     Social Drivers of Health     Food Insecurity: No Food Insecurity (2025)    Received from [facility]    Hunger Vital Sign     Worried About Running Out of Food in the Last Year: Never true     Ran Out of Food in the Last Year: Never true   Transportation Needs: No Transportation Needs (2025)    Received from [facility]     PRAPARE - Transportation     Lack of Transportation (Medical): No     Lack of Transportation (Non-Medical): No   Housing Stability: Unknown (5/31/2025)    Received from [facility]    Housing Stability Vital Sign     Unable to Pay for Housing in the Last Year: No   [5]   Current Outpatient Medications   Medication Sig Dispense Refill    hydroCHLOROthiazide 25 MG Oral Tab Take 1 tablet (25 mg total) by mouth daily. 90 tablet 3    fluticasone propionate 50 MCG/ACT Nasal Suspension 1 spray by Nasal route 2 (two) times daily. 16 g 3    azelastine 0.1 % Nasal Solution 1 spray by Nasal route 2 (two) times daily. 30 mL 3    Omeprazole 40 MG Oral Capsule Delayed Release Take 1 capsule (40 mg total) by mouth daily. Before a meal 30 capsule 2    losartan 50 MG Oral Tab Take 1 tablet (50 mg total) by mouth daily. 90 tablet 3    atenolol 50 MG Oral Tab Take 1 tablet (50 mg total) by mouth daily. 90 tablet 3    ketoconazole 2 % External Cream APPLY  CREAM TOPICALLY TO AFFECTED AREA TWICE DAILY 30 g 0    Multiple Vitamin (MULTI-VITAMIN) Oral Tab Take 1 tablet by mouth daily.      Aspirin 81 MG Oral Tab Take 1 tablet (81 mg total) by mouth daily.     [6]   Allergies  Allergen Reactions    Latex HIVES    Morphine Sulfate ITCHING

## 2025-06-03 NOTE — PATIENT INSTRUCTIONS
-reflux diet modification  -continue lowest effective dose of omeprazole  -follow with colorectal surgery   Dr. Peters of Dr. Bella [phone]    Tips to Control Acid Reflux    To control acid reflux, you’ll need to make some basic diet and lifestyle changes. The simple steps outlined below may be all you’ll need to ease discomfort.   Watch what you eat  Don't have fatty foods or spicy foods.  Eat fewer acidic foods, such as citrus and tomato-based foods. These can increase symptoms.  Limit drinking alcohol, caffeine, and fizzy beverages. All increase acid reflux.  Try limiting chocolate, peppermint, and spearmint. These can make acid reflux worse in some people.     Watch when you eat  Don't lie down for 3 hours after eating.  Don't snack before going to bed.     Raise your head  Raising your head and upper body by 4 to 6 inches helps limit reflux when you’re lying down. Put blocks under the head of your bed frame or a wedge under your mattress to raise it.   Other changes  Lose weight, if you need to  Don’t exercise near bedtime  Don't wear tight-fitting clothes  Limit aspirin and ibuprofen  Stop smoking     [employer] last reviewed this educational content on 6/1/2019  © 7967-4020 [employer]. [address] [place] [address], [place] [address]. All rights reserved. This information is not intended as a substitute for professional medical care. Always follow your healthcare professional's instructions.

## 2025-06-06 ENCOUNTER — OFFICE VISIT (OUTPATIENT)
Facility: LOCATION | Age: 60
End: 2025-06-06
Payer: MEDICAID

## 2025-06-06 DIAGNOSIS — R59.0 CERVICAL LYMPHADENOPATHY: ICD-10-CM

## 2025-06-06 DIAGNOSIS — K21.9 LARYNGOPHARYNGEAL REFLUX (LPR): Primary | ICD-10-CM

## 2025-06-06 DIAGNOSIS — R09.82 POSTNASAL DRIP: ICD-10-CM

## 2025-06-06 DIAGNOSIS — R09.A2 GLOBUS SENSATION: ICD-10-CM

## 2025-06-06 PROCEDURE — 99213 OFFICE O/P EST LOW 20 MIN: CPT | Performed by: STUDENT IN AN ORGANIZED HEALTH CARE EDUCATION/TRAINING PROGRAM

## 2025-06-06 PROCEDURE — 31575 DIAGNOSTIC LARYNGOSCOPY: CPT | Performed by: STUDENT IN AN ORGANIZED HEALTH CARE EDUCATION/TRAINING PROGRAM

## 2025-06-06 RX ORDER — POLYETHYLENE GLYCOL 3350, SODIUM SULFATE ANHYDROUS, SODIUM BICARBONATE, SODIUM CHLORIDE, POTASSIUM CHLORIDE 236; 22.74; 6.74; 5.86; 2.97 G/4L; G/4L; G/4L; G/4L; G/4L
POWDER, FOR SOLUTION ORAL
COMMUNITY
Start: 2025-06-02

## 2025-06-06 RX ORDER — METRONIDAZOLE 500 MG/1
TABLET ORAL
COMMUNITY
Start: 2025-06-02

## 2025-06-06 RX ORDER — NEOMYCIN SULFATE 500 MG/1
TABLET ORAL
COMMUNITY
Start: 2025-06-02

## 2025-06-06 NOTE — PROGRESS NOTES
[place]  OTOLARYNGOLOGY - HEAD & NECK SURGERY    6/6/2025     Reason for Consultation:   Neck lump    History of Present Illness:   Patient is a pleasant 59 year old female who is being seen for 3 to 4 weeks of a lump that she noticed in her submental area.  The patient states that she has had recent episodes of laryngitis and was having some throat pain with swallowing.  She does also have a history of reflux and has an appointment with a gastroenterologist coming up.  She was then feeling her neck and noticed a lump underneath her chin.  She states it is not painful.  She is unsure if it is growing in size.  She does not have any history of consistent tobacco use, no family history of head neck cancer, she denies any night sweats, fevers, weight loss.  She is here because this finding has caused her to be anxious regarding this.    INTERVAL HISTORY  7/9/2024: The patient returns today with continuation of her submental lymph node.  She is unsure if it has gotten any larger but she says it is noticeable.  It does cause her discomfort when she moves around.  She has not had any fevers or night sweats.  No unintended weight loss.  She is hoping to have this removed and sent for pathology.  7/29/2024: Patient is 10 days post op from excision of submental lymph node  for biopsy which is favored to be reactive based on pathology and flow cytometry.  8/6/2024: Patient returns today because she was having some firmness around her incision.  She denies any pain around the incision.  She was just concerned that she had some fullness here.  11/6/2024: Patient returns today for follow-up on her submental incision and her fullness.  She states that she has been feeling well and has not felt any mass around there.  The firmness around her incision is now improved.  She does note however that she has been having some tenderness inside her ear canals and some itchiness.  She does use Q-tips.  4/22/2025: The patient returns today  for different symptoms now.  She feels as though she has something stuck in her throat constantly.  Does have a lot of throat clearing.  Does have a history of gastroesophageal reflux.  Has an appointment with her gastroenterologist on the .  Has been taking her PPI less often now.  She also has a history of allergic rhinitis but is not currently on any nasal sprays.     2025     Triny Head is a 59 year old female who presents with improved nasal mucus sensation and upcoming colon surgery.    She experiences improved nasal mucus sensation, likely due to the use of an acid reducer and nasal sprays. Despite this improvement, she continues to have a chronic cough, which she associates with bronchitis. Her current medications include omeprazole 40 mg and regular use of nasal sprays.      Past Medical History  Past Medical History:    Arthritis    Back problem    Bronchitis    Cardiomyopathy (HCC)    \"enlarged heart\" per pt    Essential hypertension    Fibromyalgia    GERD (gastroesophageal reflux disease)    repeat EGD in  due to esophagitis    High blood pressure    Prediabetes    Problems with swallowing    Tubular adenoma of colon    repeat CLN in - sees Dr. Allen at [facility]       Past Surgical History  Past Surgical History:   Procedure Laterality Date          Colonoscopy N/A 2019    Procedure: COLONOSCOPY;  Surgeon: SARAH Pina MD;  Location: [facility] ENDOSCOPY    Colonoscopy N/A 2020    Procedure: COLONOSCOPY;  Surgeon: SARAH Pina MD;  Location: [facility] ENDOSCOPY    Colonoscopy      Cyst aspiration right      Cyst removal      Hysterectomy      38    Desmond localization wire 1 site right (cpt=19281)      Needle biopsy right      in her 20's    Oophorectomy      Other surgical history  2024    cervical lymphadenopathy    Total abdom hysterectomy      15 yrs ago       Family History  Family History   Problem Relation Age of Onset    Other (Other) Mother      Heart Disorder Father     Hypertension Father     Heart Attack Father     Prostate Cancer Maternal Grandfather 77    Cancer Maternal Grandfather     Breast Cancer Paternal Aunt         She is good now.    Diabetes Sister     Diabetes Maternal Grandmother         My sister has Diabetes       Social History  Pediatric History   Patient Parents    Not on file     Other Topics Concern    Not on file   Social History Narrative    Not on file           Current Medications:  Current Outpatient Medications   Medication Sig Dispense Refill    metroNIDAZOLE 500 MG Oral Tab Take 1 tab by mouth at 1pm , 2pm, and 10 pm the day prior to surgery      neomycin 500 MG Oral Tab Take 2 tab by mouth at 1pm , 2pm, and 10 pm the day prior to surgery      PEG 3350-KCl-NaBcb-NaCl-NaSulf (PEG-3350/ELECTROLYTES) 236 g Oral Recon Soln       hydroCHLOROthiazide 25 MG Oral Tab Take 1 tablet (25 mg total) by mouth daily. 90 tablet 3    fluticasone propionate 50 MCG/ACT Nasal Suspension 1 spray by Nasal route 2 (two) times daily. 16 g 3    azelastine 0.1 % Nasal Solution 1 spray by Nasal route 2 (two) times daily. 30 mL 3    Omeprazole 40 MG Oral Capsule Delayed Release Take 1 capsule (40 mg total) by mouth daily. Before a meal 30 capsule 2    losartan 50 MG Oral Tab Take 1 tablet (50 mg total) by mouth daily. 90 tablet 3    atenolol 50 MG Oral Tab Take 1 tablet (50 mg total) by mouth daily. 90 tablet 3    ketoconazole 2 % External Cream APPLY  CREAM TOPICALLY TO AFFECTED AREA TWICE DAILY 30 g 0    Multiple Vitamin (MULTI-VITAMIN) Oral Tab Take 1 tablet by mouth daily.      Aspirin 81 MG Oral Tab Take 1 tablet (81 mg total) by mouth daily.         Allergies  Allergies   Allergen Reactions    Latex HIVES    Morphine Sulfate ITCHING       Review of Systems:   A comprehensive 10 point review of systems was completed.  Pertinent positives and negatives noted in the the HPI.    Physical Exam:   There were no vitals taken for this visit.    GENERAL:  No acute distress, Comfortable appearing  FACE: HB 1/6, Normal Animation  HEAD: Normocephalic  EYES: EOMI, pupils equil  EARS: Bilateral Auricles Symmetric, bilateral tympanic membranes normal  NOSE: Nares patent bilaterally  ORAL CAVITY: Patient deferred oral cavity exam today  NECK: Submental incision is fully healed, soft    PROCEDURE: FLEXIBLE FIBEROPTIC LARYNGOSCOPY (37597)    Due to inability for adequate examination of the larynx and need for magnification to perform the examination, endoscopy was performed.  Risks and benefits were discussed with patient/family and they have given verbal consent to proceed.    Procedure Detail & Findings:     After placement of topical anesthetic intranasally the flexible laryngoscope was inserted into the nare and driven through the nasal cavity with no significant abnormal findings noted in the nasal cavities or nasopharynx. The oropharynx, hypopharynx and larynx were subsequently examined and the following findings were noted:    Nasal cavities and nasopharynx: There is pale hypertrophy of the bilateral inferior turbinates with posterior turbinate cobblestoning, there is copious amounts of clear postnasal drip noted    Base of Tongue: Normal    Valeculla: Normal    Arytenoids: Normal    Introitus of the esophagus: Minimal post glottic edema today    Epiglottis: Normal    False vocal cords: Normal    True vocal cords: Normal    Subglottic space: Normal    Mobility of True vocal cords: Normal    Condition: Stable    Complications: Patient tolerated the procedure well with no immediate complication.    Results:     Laboratory Data:  Lab Results   Component Value Date    WBC 6.0 01/22/2025    HGB 14.2 01/22/2025    HCT 44.0 01/22/2025    .0 01/22/2025    CREATSERUM 1.21 (H) 04/28/2025    BUN 22 04/28/2025     04/28/2025    K 3.9 04/28/2025     04/28/2025    CO2 29.0 04/28/2025    GLU 92 04/28/2025    CA 9.9 04/28/2025    ALB 4.6 04/28/2025    ALKPHO 120  04/28/2025    TP 7.5 04/28/2025    AST 20 04/28/2025    ALT 15 04/28/2025    PTT 24.0 07/11/2024    INR 0.96 07/11/2024    PTP 13.4 07/11/2024    T4F 1.2 08/09/2023    TSH 1.649 04/09/2025    LIP 64 (H) 01/17/2019    ESRML 26 05/20/2024    CRP 2.00 (H) 05/20/2024    MG 2.2 03/03/2023    TROP <0.045 05/07/2021     08/15/2022    B12 >2,000 (H) 04/09/2025         Imaging:  MRI BRAIN (CPT=70551)    Result Date: 5/3/2024  PROCEDURE: MRI BRAIN (CPT=70551)  COMPARISON: None.  INDICATIONS: G31.84 MCI (mild cognitive impairment) with memory loss  TECHNIQUE: A variety of imaging planes and parameters were utilized for visualization of suspected pathology.  Images were performed without contrast.  FINDINGS:  CEREBRUM: Scattered T2/FLAIR signal hyperintense foci within the superficial and deep cerebral white matter without mass effect or diffusion restriction.  There is otherwise no intra-axial mass, mass effect, or hemorrhage.  No abnormal diffusion restriction to suggest acute or subacute infarct. CEREBELLUM: No edema, hemorrhage, mass, acute infarction, or inappropriate atrophy.  BRAINSTEM: No edema, hemorrhage, mass, acute infarction, or inappropriate atrophy.  CSF SPACES: Ventricles, cisterns, and sulci are appropriate for age.  No hydrocephalus, subarachnoid hemorrhage, or mass.  SKULL: No mass or other significant visible lesion.  SINUSES: Limited views demonstrate no significant mucosal thickening or fluid.  ORBITS: Limited views are unremarkable.  OTHER: Negative.          CONCLUSION: No acute intracranial process.  Scattered T2/FLAIR signal changes within the cerebral white matter which may represent sequela of prior inflammation, prior infection, or microvascular white matter ischemic change related to longstanding hypertension and/or diabetes.    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  No acute lymphadenitis, granulomas, necrosis, or malignancy identified (see comment).     Comment:  Sections demonstrate preservation of the lymph node architecture with expansion of the paracortical regions comprised of mature polymorphous lymphocytes.  Focal areas of reactive sinus histiocytosis are also seen.  No acute inflammatory infiltrates, granulomas, necrosis, or primary or metastatic malignant neoplastic infiltrates are identified.       Immunophenotyping by flow cytometry demonstrates no evidence of a monotypic B cell population or T cell aberrancy by markers assayed.      Overall, the morphologic and immunophenotypic findings are consistent with paracortical reactive lymphoid cell hyperplasia.   Clinically, paracortical lymphoid cell hyperplasias are commonly seen in association with viral infections, are occasionally iatrogenically induced hypersensitivity reactions (especially to medications), or may follow vaccinations.       Recommend clinical and radiologic correlation, as well as correlation with pending cultures.     For  purposes, this case was reviewed by a second pathologist who concurred with the diagnosis.      Electronically signed by Parmjit Massey MD on 7/22/2024 at 10:37 AM         Impression:       ICD-10-CM    1. Laryngopharyngeal reflux (LPR)  K21.9       2. Globus sensation  R09.A2           Recommendations:     Laryngopharyngeal reflux  Symptoms have improved with current treatment. Continued management with acid reducer and nasal sprays. Discussed potential side effects of long-term acid reducer use, including changes in nutrient absorption. Advised taking breaks from the medication to mitigate these effects.  - Continue omeprazole 40 mg orally every morning before breakfast.  - Take a two-week break from omeprazole every few months to prevent side  effects.  - Continue nasal sprays indefinitely.    Globus sensation  Improvement likely related to reduced mucus and effective management of laryngopharyngeal reflux.    Chronic bronchitis  Chronic cough persists, likely related to known bronchitis. No new acute symptoms suggestive of lung involvement from colon pathology. Awaiting chest imaging results to rule out other issues.        Jose Wetzel,    Otolaryngology/Rhinology, Sinus, and Endoscopic Skull Base Surgery  [address] [place] [id number]  Phone [phone]  Fax [phone]  5/28/2024  9:32 AM  6/6/2025

## 2025-06-06 NOTE — PROGRESS NOTES
The following individual(s) verbally consented to be recorded using ambient AI listening technology and understand that they can each withdraw their consent to this listening technology at any point by asking the clinician to turn off or pause the recording:yes patient consents     Patient name: Triny Njris  Additional names:

## 2025-06-20 ENCOUNTER — LAB ENCOUNTER (OUTPATIENT)
Dept: LAB | Age: 60
End: 2025-06-20
Attending: INTERNAL MEDICINE
Payer: MEDICAID

## 2025-06-20 ENCOUNTER — OFFICE VISIT (OUTPATIENT)
Dept: INTERNAL MEDICINE CLINIC | Facility: CLINIC | Age: 60
End: 2025-06-20
Payer: MEDICAID

## 2025-06-20 VITALS
SYSTOLIC BLOOD PRESSURE: 132 MMHG | DIASTOLIC BLOOD PRESSURE: 83 MMHG | WEIGHT: 216 LBS | HEIGHT: 63 IN | BODY MASS INDEX: 38.27 KG/M2 | HEART RATE: 57 BPM

## 2025-06-20 DIAGNOSIS — Z01.818 PREOP EXAMINATION: ICD-10-CM

## 2025-06-20 DIAGNOSIS — K63.89 COLONIC MASS: ICD-10-CM

## 2025-06-20 DIAGNOSIS — R06.02 SHORTNESS OF BREATH: Primary | ICD-10-CM

## 2025-06-20 DIAGNOSIS — R06.02 SHORTNESS OF BREATH: ICD-10-CM

## 2025-06-20 DIAGNOSIS — N18.32 STAGE 3B CHRONIC KIDNEY DISEASE (HCC): ICD-10-CM

## 2025-06-20 DIAGNOSIS — I10 PRIMARY HYPERTENSION: ICD-10-CM

## 2025-06-20 LAB
ANION GAP SERPL CALC-SCNC: 6 MMOL/L (ref 0–18)
BNP SERPL-MCNC: 8 PG/ML (ref ?–100)
BUN BLD-MCNC: 27 MG/DL (ref 9–23)
BUN/CREAT SERPL: 19.7 (ref 10–20)
CALCIUM BLD-MCNC: 9.8 MG/DL (ref 8.7–10.4)
CHLORIDE SERPL-SCNC: 103 MMOL/L (ref 98–112)
CO2 SERPL-SCNC: 31 MMOL/L (ref 21–32)
CREAT BLD-MCNC: 1.37 MG/DL (ref 0.55–1.02)
EGFRCR SERPLBLD CKD-EPI 2021: 44 ML/MIN/1.73M2 (ref 60–?)
FASTING STATUS PATIENT QL REPORTED: NO
GLUCOSE BLD-MCNC: 91 MG/DL (ref 70–99)
OSMOLALITY SERPL CALC.SUM OF ELEC: 295 MOSM/KG (ref 275–295)
POTASSIUM SERPL-SCNC: 3.7 MMOL/L (ref 3.5–5.1)
SODIUM SERPL-SCNC: 140 MMOL/L (ref 136–145)

## 2025-06-20 PROCEDURE — 80048 BASIC METABOLIC PNL TOTAL CA: CPT

## 2025-06-20 PROCEDURE — 99214 OFFICE O/P EST MOD 30 MIN: CPT | Performed by: INTERNAL MEDICINE

## 2025-06-20 PROCEDURE — 36415 COLL VENOUS BLD VENIPUNCTURE: CPT

## 2025-06-20 PROCEDURE — 83880 ASSAY OF NATRIURETIC PEPTIDE: CPT

## 2025-06-23 ENCOUNTER — TELEPHONE (OUTPATIENT)
Dept: INTERNAL MEDICINE CLINIC | Facility: CLINIC | Age: 60
End: 2025-06-23

## 2025-06-23 ENCOUNTER — OFFICE VISIT (OUTPATIENT)
Dept: NEPHROLOGY | Facility: CLINIC | Age: 60
End: 2025-06-23
Payer: MEDICAID

## 2025-06-23 VITALS
WEIGHT: 217 LBS | SYSTOLIC BLOOD PRESSURE: 118 MMHG | HEIGHT: 63 IN | BODY MASS INDEX: 38.45 KG/M2 | HEART RATE: 56 BPM | DIASTOLIC BLOOD PRESSURE: 74 MMHG

## 2025-06-23 DIAGNOSIS — I10 PRIMARY HYPERTENSION: ICD-10-CM

## 2025-06-23 DIAGNOSIS — E66.9 OBESITY (BMI 30-39.9): Primary | ICD-10-CM

## 2025-06-23 DIAGNOSIS — N18.30 STAGE 3 CHRONIC KIDNEY DISEASE, UNSPECIFIED WHETHER STAGE 3A OR 3B CKD (HCC): ICD-10-CM

## 2025-06-23 PROCEDURE — 99215 OFFICE O/P EST HI 40 MIN: CPT | Performed by: INTERNAL MEDICINE

## 2025-06-23 NOTE — TELEPHONE ENCOUNTER
Patient called reporting that her cardiologist cannot see her any sooner than 07/16 and was told to call back if this occurred.  Nurse attempted to contact [facility] [place] and was kept on an elongated hold, unable to continue to hold. Dr. Danielle please advise.

## 2025-06-23 NOTE — PATIENT INSTRUCTIONS
Increase fluid intake at 65-75 ounces a day   Ok to proceed for surgery   Lab tests prior to next visit  Follow up in 6 months

## 2025-06-23 NOTE — TELEPHONE ENCOUNTER
Spoke to patient, advised her to try to see Dr. Acosta provided phone number and address, she will let us know what happens with a call tomorrow, otherwise will be calling [facility] again tomorrow

## 2025-06-23 NOTE — PROGRESS NOTES
Progress Note:      Patient is a 59 yrs old female with pmh of HTN, GERD, Fibromyalgia, CKD III, TA of colon, impaired blood sugar who presented for follow up     Patient was last seen >2 yrs back     Echo and nuclear stress test in  unremarkable.     Non smoker and social drinker. 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     Colonoscopy showed 5 cm polypoid lesion and wasn't able to resect - undergoing surgery at [facility] for colon resection on     Appetite is good. Energy is ok. Aic 6.1% . No leg swelling or urinary complaints.       HISTORY:  Past Medical History:    Arthritis    Back problem    Bronchitis    Cardiomyopathy (HCC)    \"enlarged heart\" per pt    Essential hypertension    Fibromyalgia    GERD (gastroesophageal reflux disease)    repeat EGD in  due to esophagitis    High blood pressure    Prediabetes    Problems with swallowing    Tubular adenoma of colon    repeat CLN in - sees Dr. Allen at [facility]      Past Surgical History:   Procedure Laterality Date          Colonoscopy N/A 2019    Procedure: COLONOSCOPY;  Surgeon: SARAH Pina MD;  Location: [facility] ENDOSCOPY    Colonoscopy N/A 2020    Procedure: COLONOSCOPY;  Surgeon: SARAH Pina MD;  Location: [facility] ENDOSCOPY    Colonoscopy      Cyst aspiration right      Cyst removal      Hysterectomy      38    Desmond localization wire 1 site right (cpt=19281)      Needle biopsy right      in her 20's    Oophorectomy      Other surgical history  2024    cervical lymphadenopathy    Total abdom hysterectomy      15 yrs ago      Family History   Problem Relation Age of Onset    Other (Other) Mother     Heart Disorder Father     Hypertension Father     Heart Attack Father     Prostate Cancer Maternal Grandfather 77    Cancer Maternal Grandfather     Breast Cancer Paternal Aunt         She is good now.    Diabetes Sister     Diabetes Maternal Grandmother         My sister  has Diabetes      Social History:   Social History     Socioeconomic History    Marital status: Single   Tobacco Use    Smoking status: Former     Types: Cigarettes     Start date: 1979     Passive exposure: Past    Smokeless tobacco: Never   Vaping Use    Vaping status: Never Used   Substance and Sexual Activity    Alcohol use: Yes     Alcohol/week: 1.0 standard drink of alcohol     Types: 1 Cans of beer per week     Comment: Maybe once other week.    Drug use: No    Sexual activity: Not Currently     Social Drivers of Health     Food Insecurity: No Food Insecurity (5/31/2025)    Received from [facility]    Hunger Vital Sign     Worried About Running Out of Food in the Last Year: Never true     Ran Out of Food in the Last Year: Never true   Transportation Needs: No Transportation Needs (5/31/2025)    Received from [facility]    PRAPARE - Transportation     Lack of Transportation (Medical): No     Lack of Transportation (Non-Medical): No   Housing Stability: Unknown (5/31/2025)    Received from [facility]    Housing Stability Vital Sign     Unable to Pay for Housing in the Last Year: No        Medications (Active prior to today's visit):  Current Outpatient Medications   Medication Sig Dispense Refill    neomycin 500 MG Oral Tab Take 2 tab by mouth at 1pm , 2pm, and 10 pm the day prior to surgery      hydroCHLOROthiazide 25 MG Oral Tab Take 1 tablet (25 mg total) by mouth daily. 90 tablet 3    fluticasone propionate 50 MCG/ACT Nasal Suspension 1 spray by Nasal route 2 (two) times daily. 16 g 3    azelastine 0.1 % Nasal Solution 1 spray by Nasal route 2 (two) times daily. 30 mL 3    Omeprazole 40 MG Oral Capsule Delayed Release Take 1 capsule (40 mg total) by mouth daily. Before a meal 30 capsule 2    losartan 50 MG Oral Tab Take 1 tablet (50 mg total) by mouth daily. 90 tablet 3    Multiple Vitamin (MULTI-VITAMIN) Oral Tab Take 1 tablet by mouth  daily.      Aspirin 81 MG Oral Tab Take 1 tablet (81 mg total) by mouth daily.      metroNIDAZOLE 500 MG Oral Tab Take 1 tab by mouth at 1pm , 2pm, and 10 pm the day prior to surgery (Patient not taking: Reported on 6/23/2025)      PEG 3350-KCl-NaBcb-NaCl-NaSulf (PEG-3350/ELECTROLYTES) 236 g Oral Recon Soln  (Patient not taking: Reported on 6/23/2025)      atenolol 50 MG Oral Tab Take 1 tablet (50 mg total) by mouth daily. 90 tablet 3    ketoconazole 2 % External Cream APPLY  CREAM TOPICALLY TO AFFECTED AREA TWICE DAILY (Patient not taking: Reported on 6/23/2025) 30 g 0       Allergies:  Allergies   Allergen Reactions    Latex HIVES    Morphine Sulfate ITCHING         ROS:     Constitutional:  Negative for decreased activity, fever, irritability and lethargy  ENMT:  Negative for ear drainage, hearing loss and nasal drainage  Eyes:  Negative for eye discharge and vision loss  Cardiovascular:  Negative for chest pain, sobs  Respiratory:  Negative for cough, dyspnea and wheezing  Gastrointestinal:  Negative for abdominal pain, constipation  Genitourinary:  Negative for dysuria and hematuria  Endocrine:  Negative for abnormal sleep patterns  Hema/Lymph:  Negative for easy bleeding and easy bruising  Integumentary:  Negative for pruritus and rash  Musculoskeletal:  Negative for bone/joint symptoms  Neurological:  Negative for gait disturbance  Psychiatric:  Negative for inappropriate interaction       Vitals:    06/23/25 1025   BP: 118/74   Pulse: 56       Wt Readings from Last 6 Encounters:   06/23/25 217 lb (98.4 kg)   06/20/25 216 lb (98 kg)   06/03/25 216 lb 1.6 oz (98 kg)   05/27/25 217 lb (98.4 kg)   04/29/25 217 lb (98.4 kg)   04/28/25 216 lb 4.8 oz (98.1 kg)         PHYSICAL EXAM:   Constitutional: appears well hydrated alert and responsive no acute distress noted  Head/Face: normocephalic  Eyes/Vision: normal extraocular motion is intact  Nose/Mouth/Throat:mucous membranes are moist   Neck/Thyroid: neck is  supple   Skin/Hair: no unusual rashes present  Back/Spine: no abnormalities noted  Musculoskeletal:  no deformities  Extremities: no edema  Neurological:  Grossly normal       ASSESSMENT/PLAN:     1. CKD stage III:  - BUN/Cr 27/1.37 mg/dl - likely fluctuation from lab as Cr  Cr 1.2 mg/dl with an eGFR 52 ml/min in early June 2025   - CKD presumably 2/2 to hypertensive disease   - creatinine stable atleast since 2019.   - on hydrochlorothiazide  - UA unremarkable - on losartan. UACR unremarkable   - urine protein /Cr ratio unremarkable -0.16   - renal US - showed bilateral simple cysts. Normal size kidneys with echogenicity and 0.5 cm non obstructing stone in left kidneys.  CT scan from august 2020 noted. Increase water, low sodium, low animal protein   - avoid nephrotoxins   - fluid intake to 65-75 ounces a day     2. HTN:   - home BP stable - mostly in 120-130s range  - on hydrochlorothiazide  25 mg and atenolol 50 mg   - on losartan 50 mg daily dose - cont.  - CTAP showed no adrenal mass or enlargement from August 2020    Follow up in 6 months    Lab tests prior to next visit     Ok to proceed for surgery        Orders This Visit:  Orders Placed This Encounter   Procedures    Microalb/Creat Ratio, Random Urine    Renal Function Panel       Meds This Visit:  Requested Prescriptions      No prescriptions requested or ordered in this encounter       Imaging & Referrals:  None     6/23/2025  Alessandro Valdez MD

## 2025-06-26 ENCOUNTER — TELEPHONE (OUTPATIENT)
Dept: INTERNAL MEDICINE CLINIC | Facility: CLINIC | Age: 60
End: 2025-06-26

## 2025-06-30 ENCOUNTER — TELEPHONE (OUTPATIENT)
Dept: CASE MANAGEMENT | Age: 60
End: 2025-06-30

## 2025-06-30 ENCOUNTER — TELEPHONE (OUTPATIENT)
Dept: CARDIOLOGY | Age: 60
End: 2025-06-30

## 2025-06-30 DIAGNOSIS — I10 PRIMARY HYPERTENSION: ICD-10-CM

## 2025-06-30 DIAGNOSIS — R06.09 EXERTIONAL DYSPNEA: Primary | ICD-10-CM

## 2025-06-30 NOTE — TELEPHONE ENCOUNTER
Dr. Danielle **     Patient called requesting referral to Dr. Pagan.    Please provide DX on referral.     Patient has Medicaid, and this is Order Only.      Please have staff fax order to: [phone] or [phone]    Pended referral please review diagnosis and sign off if you agree.    Thank you.  Roseanne Tesfaye  [facility]

## 2025-07-02 ENCOUNTER — HOSPITAL ENCOUNTER (OUTPATIENT)
Dept: RESPIRATORY THERAPY | Facility: HOSPITAL | Age: 60
End: 2025-07-02
Attending: INTERNAL MEDICINE
Payer: MEDICAID

## 2025-07-02 ENCOUNTER — APPOINTMENT (OUTPATIENT)
Dept: CARDIOLOGY | Age: 60
End: 2025-07-02

## 2025-07-02 VITALS
RESPIRATION RATE: 16 BRPM | HEIGHT: 63 IN | HEART RATE: 57 BPM | OXYGEN SATURATION: 99 % | WEIGHT: 216.05 LBS | DIASTOLIC BLOOD PRESSURE: 84 MMHG | SYSTOLIC BLOOD PRESSURE: 133 MMHG | BODY MASS INDEX: 38.28 KG/M2

## 2025-07-02 DIAGNOSIS — Z82.49 FAMILY HISTORY OF HEART DISEASE: ICD-10-CM

## 2025-07-02 DIAGNOSIS — Z01.818 PRE-OP EVALUATION: Primary | ICD-10-CM

## 2025-07-02 DIAGNOSIS — R06.02 SHORTNESS OF BREATH: ICD-10-CM

## 2025-07-02 DIAGNOSIS — I10 PRIMARY HYPERTENSION: ICD-10-CM

## 2025-07-02 RX ORDER — METRONIDAZOLE 500 MG/1
TABLET ORAL
COMMUNITY
Start: 2025-06-02

## 2025-07-02 RX ORDER — FLUTICASONE PROPIONATE 50 MCG
1 SPRAY, SUSPENSION (ML) NASAL
COMMUNITY
Start: 2025-04-22

## 2025-07-02 RX ORDER — OMEPRAZOLE 40 MG/1
40 CAPSULE, DELAYED RELEASE ORAL
COMMUNITY
Start: 2025-04-22

## 2025-07-02 RX ORDER — LOSARTAN POTASSIUM 50 MG/1
50 TABLET ORAL DAILY
COMMUNITY
Start: 2025-05-18

## 2025-07-02 RX ORDER — NEOMYCIN SULFATE 500 MG/1
TABLET ORAL
COMMUNITY
Start: 2025-06-02

## 2025-07-02 RX ORDER — HYDROCHLOROTHIAZIDE 25 MG/1
25 TABLET ORAL DAILY
COMMUNITY
Start: 2025-05-12

## 2025-07-02 RX ORDER — AZELASTINE 1 MG/ML
1 SPRAY, METERED NASAL
COMMUNITY
Start: 2025-04-22

## 2025-07-02 RX ORDER — ATENOLOL 50 MG/1
50 TABLET ORAL DAILY
COMMUNITY

## 2025-07-02 RX ORDER — ASPIRIN 81 MG/1
81 TABLET ORAL DAILY
COMMUNITY

## 2025-07-02 SDOH — HEALTH STABILITY: PHYSICAL HEALTH: ON AVERAGE, HOW MANY MINUTES DO YOU ENGAGE IN EXERCISE AT THIS LEVEL?: 30 MIN

## 2025-07-02 SDOH — HEALTH STABILITY: PHYSICAL HEALTH: ON AVERAGE, HOW MANY DAYS PER WEEK DO YOU ENGAGE IN MODERATE TO STRENUOUS EXERCISE (LIKE A BRISK WALK)?: 2 DAYS

## 2025-07-02 ASSESSMENT — PATIENT HEALTH QUESTIONNAIRE - PHQ9
2. FEELING DOWN, DEPRESSED OR HOPELESS: NOT AT ALL
SUM OF ALL RESPONSES TO PHQ9 QUESTIONS 1 AND 2: 0
CLINICAL INTERPRETATION OF PHQ2 SCORE: NO FURTHER SCREENING NEEDED
1. LITTLE INTEREST OR PLEASURE IN DOING THINGS: NOT AT ALL
SUM OF ALL RESPONSES TO PHQ9 QUESTIONS 1 AND 2: 0

## 2025-07-03 LAB
ATRIAL RATE (BPM): 58
P AXIS (DEGREES): 5
PR-INTERVAL (MSEC): 156
QRS-INTERVAL (MSEC): 82
QT-INTERVAL (MSEC): 410
QTC: 403
R AXIS (DEGREES): 16
REPORT TEXT: NORMAL
T AXIS (DEGREES): 43
VENTRICULAR RATE EKG/MIN (BPM): 58

## 2025-07-09 ENCOUNTER — HOSPITAL ENCOUNTER (OUTPATIENT)
Dept: ULTRASOUND IMAGING | Age: 60
Discharge: HOME | End: 2025-07-09
Attending: STUDENT IN AN ORGANIZED HEALTH CARE EDUCATION/TRAINING PROGRAM
Payer: MEDICAID

## 2025-07-09 ENCOUNTER — APPOINTMENT (OUTPATIENT)
Dept: CARDIOLOGY | Age: 60
End: 2025-07-09
Attending: INTERNAL MEDICINE

## 2025-07-09 DIAGNOSIS — I10 PRIMARY HYPERTENSION: ICD-10-CM

## 2025-07-09 DIAGNOSIS — Z82.49 FAMILY HISTORY OF HEART DISEASE: ICD-10-CM

## 2025-07-09 DIAGNOSIS — R59.0 CERVICAL LYMPHADENOPATHY: ICD-10-CM

## 2025-07-09 DIAGNOSIS — Z01.818 PRE-OP EVALUATION: ICD-10-CM

## 2025-07-09 DIAGNOSIS — R06.02 SHORTNESS OF BREATH: ICD-10-CM

## 2025-07-09 LAB
AORTIC VALVE AREA (AVA): 0.77
AV PEAK GRADIENT (AVPG): 8
AV STENOSIS SEVERITY TEXT: NORMAL
AV VMAX SYS DOP: 1.37
AVI LVOT PEAK GRADIENT (LVOTMG): 1.1
E WAVE DECELARATION TIME (MDT): 10.79
LEFT INTERNAL DIMENSION IN SYSTOLE (LVSD): 1
LEFT VENTRICULAR INTERNAL DIMENSION IN DIASTOLE (LVDD): 2.8
LEFT VENTRICULAR POSTERIOR WALL IN END DIASTOLE (LVPW): 4.2
LV EF 2D ECHO EST: NORMAL %
MV E TISSUE VEL MED (MESV): 7.96
MV E WAVE VEL/E TISSUE VEL MED(MSR): 7.09
MV PEAK A VELOCITY (MVPAV): 263
MV PEAK E VELOCITY (MVPEV): 0.72
RV END SYSTOLIC LONGITUDINAL STRAIN FREE WALL (RVGS): 2
TV ESTIMATED RIGHT ARTERIAL PRESSURE (RAP): 13.3

## 2025-07-09 PROCEDURE — 76536 US EXAM OF HEAD AND NECK: CPT | Performed by: STUDENT IN AN ORGANIZED HEALTH CARE EDUCATION/TRAINING PROGRAM

## 2025-07-09 PROCEDURE — 93306 TTE W/DOPPLER COMPLETE: CPT | Performed by: INTERNAL MEDICINE

## 2025-07-11 ENCOUNTER — HOSPITAL ENCOUNTER (OUTPATIENT)
Dept: MAMMOGRAPHY | Facility: HOSPITAL | Age: 60
Discharge: HOME | End: 2025-07-11
Attending: ADVANCED PRACTICE MIDWIFE
Payer: MEDICAID

## 2025-07-11 ENCOUNTER — HOSPITAL ENCOUNTER (OUTPATIENT)
Dept: RESPIRATORY THERAPY | Facility: HOSPITAL | Age: 60
Discharge: HOME | End: 2025-07-11
Attending: INTERNAL MEDICINE
Payer: MEDICAID

## 2025-07-11 DIAGNOSIS — Z12.31 BREAST CANCER SCREENING BY MAMMOGRAM: ICD-10-CM

## 2025-07-11 DIAGNOSIS — R06.02 SHORTNESS OF BREATH: ICD-10-CM

## 2025-07-11 PROCEDURE — 77067 SCR MAMMO BI INCL CAD: CPT | Performed by: ADVANCED PRACTICE MIDWIFE

## 2025-07-11 PROCEDURE — 94726 PLETHYSMOGRAPHY LUNG VOLUMES: CPT | Performed by: INTERNAL MEDICINE

## 2025-07-11 PROCEDURE — 94729 DIFFUSING CAPACITY: CPT | Performed by: INTERNAL MEDICINE

## 2025-07-11 PROCEDURE — 94060 EVALUATION OF WHEEZING: CPT | Performed by: INTERNAL MEDICINE

## 2025-07-11 PROCEDURE — 77063 BREAST TOMOSYNTHESIS BI: CPT | Performed by: ADVANCED PRACTICE MIDWIFE

## 2025-07-15 ENCOUNTER — TELEPHONE (OUTPATIENT)
Dept: CARDIOLOGY | Age: 60
End: 2025-07-15

## 2025-07-16 ENCOUNTER — TELEPHONE (OUTPATIENT)
Dept: CARDIOLOGY | Age: 60
End: 2025-07-16

## 2025-07-17 ENCOUNTER — TELEPHONE (OUTPATIENT)
Dept: INTERNAL MEDICINE CLINIC | Facility: CLINIC | Age: 60
End: 2025-07-17

## 2025-07-17 NOTE — TELEPHONE ENCOUNTER
Patient calling to follow up on surgical clearance. Patient had her pulmonary function test done on 7/11/2025 and it has not been reviewed by doctor yet. Please advise. Already has cardiac clearance. Patient needs an answer by 12pm today.     Dr Danielle not in the office today, so also sent to [facility].    Telephone Encounter - Piedad Nguyen RN - 07/16/2025 9:35 AM CDT  Formatting of this note might be different from the original.  Spoke to [facility]. Cardiac clearance faxed to Dr. Jagdish Peters  Electronically signed by Piedad Nguyen, RN at 07/16/2025 9:40 AM CDT

## 2025-07-17 NOTE — TELEPHONE ENCOUNTER
Spoke to patient about pulmonary function test, no significant obstructive defect, mild no abnormalities reported, I believe patient has low risk from pulmonary point of view for this procedure

## 2025-07-21 ENCOUNTER — TELEPHONE (OUTPATIENT)
Dept: CARDIOLOGY | Age: 60
End: 2025-07-21

## 2025-08-26 ENCOUNTER — TELEPHONE (OUTPATIENT)
Dept: SLEEP CENTER | Age: 60
End: 2025-08-26

## 2025-11-05 ENCOUNTER — APPOINTMENT (OUTPATIENT)
Dept: CARDIOLOGY | Age: 60
End: 2025-11-05
Attending: INTERNAL MEDICINE

## (undated) DIAGNOSIS — Z12.31 SCREENING MAMMOGRAM FOR BREAST CANCER: Primary | ICD-10-CM

## (undated) DEVICE — YANKAUER,BULB TIP,W/O VENT,RIGID,STERILE: Brand: MEDLINE

## (undated) DEVICE — DEVICE INFL 60ML 15ATM PRSS COOK SPHR

## (undated) DEVICE — SUCTION CANISTER, 3000CC,SAFELINER: Brand: DEROYAL

## (undated) DEVICE — MEDI-VAC NON-CONDUCTIVE SUCTION TUBING: Brand: CARDINAL HEALTH

## (undated) DEVICE — CONMED SCOPE SAVER BITE BLOCK, 20X27 MM: Brand: SCOPE SAVER

## (undated) DEVICE — FORCEP RADIAL JAW 4

## (undated) DEVICE — MEDI-VAC NON-CONDUCTIVE SUCTION TUBING 6MM X 1.8M (6FT.) L: Brand: CARDINAL HEALTH

## (undated) DEVICE — HERCULES 3 STAGE BALLOON ESOPHAGEAL: Brand: HERCULES

## (undated) DEVICE — KIT CLEAN ENDOKIT 1.1OZ GOWNX2

## (undated) DEVICE — DURACLIP 11MM 235CM

## (undated) DEVICE — MASK PROC W/VISOR ANTIGLARE

## (undated) DEVICE — 3M™ STERI-STRIP™ REINFORCED ADHESIVE SKIN CLOSURES, R1546, 1/4 IN X 4 IN (6 MM X 100 MM), 10 STRIPS/ENVELOPE: Brand: 3M™ STERI-STRIP™

## (undated) DEVICE — CO2 CANNULA,SSOFT,ADLT,7O2,4CO2,FEMALE: Brand: MEDLINE

## (undated) DEVICE — SYRINGE, LUER SLIP, STERILE, 60ML: Brand: MEDLINE

## (undated) DEVICE — SKIN PREP TRAY 4 COMPARTM TRAY: Brand: MEDLINE INDUSTRIES, INC.

## (undated) DEVICE — NEEDLE CONTRAST INTERJECT 25G

## (undated) DEVICE — LINE MNTR ADLT SET O2 INTMD

## (undated) DEVICE — 35 ML SYRINGE REGULAR TIP: Brand: MONOJECT

## (undated) DEVICE — Device: Brand: DEFENDO AIR/WATER/SUCTION AND BIOPSY VALVE

## (undated) DEVICE — SOLUTION IRRIG 1000ML 0.9% NACL USP BTL

## (undated) DEVICE — UNDYED BRAIDED (POLYGLACTIN 910), SYNTHETIC ABSORBABLE SUTURE: Brand: COATED VICRYL

## (undated) DEVICE — Device: Brand: CUSTOM PROCEDURE KIT

## (undated) DEVICE — KIT MFLD FOR SPEC COLL

## (undated) DEVICE — SUT PROL 5-0 18IN P-3 NABSRB BLU L13MM 3/8 CI

## (undated) DEVICE — SNARE OPTMZ PLPCTM TRP

## (undated) DEVICE — PACK CDS HEAD

## (undated) DEVICE — GAMMEX® PI HYBRID SIZE 8, STERILE POWDER-FREE SURGICAL GLOVE, POLYISOPRENE AND NEOPRENE BLEND: Brand: GAMMEX

## (undated) DEVICE — Device: Brand: SPOT EX ENDOSCOPIC TATTOO

## (undated) DEVICE — GIJAW SINGLE-USE BIOPSY FORCEPS WITH NEEDLE: Brand: GIJAW

## (undated) DEVICE — SNARE 9MM 230CM 2.4MM EXACTO

## (undated) DEVICE — KIT ENDO ORCAPOD 160/180/190

## (undated) DEVICE — CLIP LGT 11MM OPEN 2.8MM 235CM

## (undated) NOTE — LETTER
11/18/19        [address] [place] records indicate that you have outstanding lab work and or testing that was ordered for you and has not yet been completed:  Orders Placed This Encounter      CB

## (undated) NOTE — LETTER
24          Triny Head  :  1965      To Whom It May Concern:    Patient was seen at the [facility] on 24. Patient received general anesthesia and can last up to 24 hours. Judgment and reflexes may be altered. It is recommended that patient:    Does not drive any motor vehicle or bicycle   Avoids mowing the lawn, playing sports, or working with power tools/applicances (power saws, electric knives or mixers)   Do not sign legal documents within 24 hours of your procedure     If you have any further questions, feel free to contact us.    Sincerely,  [facility]

## (undated) NOTE — ED AVS SNAPSHOT
[facility]    [address]     [place] [address]    Phone:  [phone]    Fax:  [phone]   MRN: [id number]    Department:  [facility]   Date of Visit:  5/2 and Class Registration line at [phone] or find a doctor online by visiting [email or web address].    IF THERE IS ANY CHANGE OR WORSENING OF YOUR CONDITION, CALL YOUR PRIMARY CARE PHYSICIAN AT ONCE OR RETURN IMMEDIATELY TO [address] [place] [address].     If

## (undated) NOTE — LETTER
5/18/2021              Becky Drop        [id number]         To Whom It May Concern,    This letter is to certify that patient Ulysses Inches. Juliene Nightingale is being treated by Dr. Kendra Metz MD. Patient may not return to work

## (undated) NOTE — LETTER
03/14/19        [address] [facility] [place]      Dear Yoly Lazar,    [address] records indicate that you have outstanding lab work and or testing that was ordered for you and has not yet been completed:  Orders Placed This Encounter      Ba

## (undated) NOTE — LETTER
[facility]  Administration of Anesthesia  Triny PINON agree to be cared for by a physician anesthesiologist alone and/or with a nurse anesthetist, who is specially trained to monitor me and give me medicine to put me to sleep or keep me comfortable during my procedure    I understand that my anesthesiologist and/or anesthetist is not an employee or agent of [facility] or [employer]. He or she works for [facility] [employer]    As the patient asking for anesthesia services, I agree to:  Allow the anesthesiologist (anesthesia doctor) to give me medicine and do additional procedures as necessary. Some examples are: Starting or using an “IV” to give me medicine, fluids or blood during my procedure, and having a breathing tube placed to help me breathe when I’m asleep (intubation). In the event that my heart stops working properly, I understand that my anesthesiologist will make every effort to sustain my life, unless otherwise directed by [facility] Do Not Resuscitate documents.  Tell my anesthesia doctor before my procedure:  If I am pregnant.  The last time that I ate or drank.  iii. All of the medicines I take (including prescriptions, herbal supplements, and pills I can buy without a prescription (including street drugs/illegal medications). Failure to inform my anesthesiologist about these medicines may increase my risk of anesthetic complications.  iv.If I am allergic to anything or have had a reaction to anesthesia before.  I understand how the anesthesia medicine will help me (benefits).  I understand that with any type of anesthesia medicine there are risks:  The most common risks are: nausea, vomiting, sore throat, muscle soreness, damage to my eyes, mouth, or teeth (from breathing tube placement).  Rare risks include: remembering what happened during my procedure, allergic reactions to medications, injury to my airway, heart, lungs, vision, nerves, or  muscles and in extremely rare instances death.  My doctor has explained to me other choices available to me for my care (alternatives).  Pregnant Patients (“epidural”):  I understand that the risks of having an epidural (medicine given into my back to help control pain during labor), include itching, low blood pressure, difficulty urinating, headache or slowing of the baby’s heart. Very rare risks include infection, bleeding, seizure, irregular heart rhythms and nerve injury.  Regional Anesthesia (“spinal”, “epidural”, & “nerve blocks”):  I understand that rare but potential complications include headache, bleeding, infection, seizure, irregular heart rhythms, and nerve injury.    _____________________________________________________________________________  Patient (or Representative) Signature/Relationship to Patient  Date   Time    _____________________________________________________________________________   Name (if used)    Language/Organization   Time    _____________________________________________________________________________  Nurse Anesthetist Signature     Date   Time  _____________________________________________________________________________  Anesthesiologist Signature     Date   Time  I have discussed the procedure and information above with the patient (or patient’s representative) and answered their questions. The patient or their representative has agreed to have anesthesia services.    _____________________________________________________________________________  Witness        Date   Time  I have verified that the signature is that of the patient or patient’s representative, and that it was signed before the procedure  Patient Name: Triny Head     : 1965                 Printed: 2024 at 1:18 PM    Medical Record #: [id number]                                            Page 1 of 1  ----------ANESTHESIA CONSENT----------

## (undated) NOTE — LETTER
[facility]  [address], [place], [place]    Authorization for Surgical Operation and Procedure                               I hereby authorize SARAH Pina MD, my physician and his/her assistants (if applicable), which may include medical students, residents, and/or fellows, to perform the following surgical operation/ procedure and administer such anesthesia as may be determined necessary by my physician: Operation/Procedure name (s) ESOPHAGOGASTRODUODENOSCOPY with Possible Dilation on Triny Head   2.   I recognize that during the surgical operation/procedure, unforeseen conditions may necessitate additional or different procedures than those listed above.  I, therefore, further authorize and request that the above-named surgeon, assistants, or designees perform such procedures as are, in their judgment, necessary and desirable.    3.   My surgeon/physician has discussed prior to my surgery the potential benefits, risks and side effects of this procedure; the likelihood of achieving goals; and potential problems that might occur during recuperation.  They also discussed reasonable alternatives to the procedure, including risks, benefits, and side effects related to the alternatives and risks related to not receiving this procedure.  I have had all my questions answered and I acknowledge that no guarantee has been made as to the result that may be obtained.    4.   Should the need arise during my operation/procedure, which includes change of level of care prior to discharge, I also consent to the administration of blood and/or blood products.  Further, I understand that despite careful testing and screening of blood or blood products by collecting agencies, I may still be subject to ill effects as a result of receiving a blood transfusion and/or blood products.  The following are some, but not all, of the potential risks that can occur: fever and allergic reactions, hemolytic  reactions, transmission of diseases such as Hepatitis, AIDS and Cytomegalovirus (CMV) and fluid overload.  In the event that I wish to have an autologous transfusion of my own blood, or a directed donor transfusion, I will discuss this with my physician.  Check only if Refusing Blood or Blood Products  I understand refusal of blood or blood products as deemed necessary by my physician may have serious consequences to my condition to include possible death. I hereby assume responsibility for my refusal and release the hospital, its personnel, and my physicians from any responsibility for the consequences of my refusal.    o  Refuse   5.   I authorize the use of any specimen, organs, tissues, body parts or foreign objects that may be removed from my body during the operation/procedure for diagnosis, research or teaching purposes and their subsequent disposal by hospital authorities.  I also authorize the release of specimen test results and/or written reports to my treating physician on the hospital medical staff or other referring or consulting physicians involved in my care, at the discretion of the Pathologist or my treating physician.    6.   I consent to the photographing or videotaping of the operations or procedures to be performed, including appropriate portions of my body for medical, scientific, or educational purposes, provided my identity is not revealed by the pictures or by descriptive texts accompanying them.  If the procedure has been photographed/videotaped, the surgeon will obtain the original picture, image, videotape or CD.  The hospital will not be responsible for storage, release or maintenance of the picture, image, tape or CD.    7.   I consent to the presence of a  or observers in the operating room as deemed necessary by my physician or their designees.    8.   I recognize that in the event my procedure results in extended X-Ray/fluoroscopy time, I may develop a skin  reaction.    9. If I have a Do Not Attempt Resuscitation (DNAR) order in place, that status will be suspended while in the operating room, procedural suite, and during the recovery period unless otherwise explicitly stated by me (or a person authorized to consent on my behalf). The surgeon or my attending physician will determine when the applicable recovery period ends for purposes of reinstating the DNAR order.  10. Patients having a sterilization procedure: I understand that if the procedure is successful the results will be permanent and it will therefore be impossible for me to inseminate, conceive, or bear children.  I also understand that the procedure is intended to result in sterility, although the result has not been guaranteed.   11. I acknowledge that my physician has explained sedation/analgesia administration to me including the risk and benefits I consent to the administration of sedation/analgesia as may be necessary or desirable in the judgment of my physician.    I CERTIFY THAT I HAVE READ AND FULLY UNDERSTAND THE ABOVE CONSENT TO OPERATION and/or OTHER PROCEDURE.     ____________________________________  _________________________________        ______________________________  Signature of Patient    Signature of Responsible Person                Printed Name of Responsible Person                                      ____________________________________  _____________________________                ________________________________  Signature of Witness        Date  Time         Relationship to Patient    STATEMENT OF PHYSICIAN My signature below affirms that prior to the time of the procedure; I have explained to the patient and/or his/her legal representative, the risks and benefits involved in the proposed treatment and any reasonable alternative to the proposed treatment. I have also explained the risks and benefits involved in refusal of the proposed treatment and alternatives to the proposed  treatment and have answered the patient's questions. If I have a significant financial interest in a co-management agreement or a significant financial interest in any product or implant, or other significant relationship used in this procedure/surgery, I have disclosed this and had a discussion with my patient.     _____________________________________________________              _____________________________  (Signature of Physician)                                                                                         (Date)                                   (Time)  Patient Name: Triny NIELSEN Adilene      : 1965      Printed: 2024     Medical Record #: [id number]                                      Page 1 of 1

## (undated) NOTE — LETTER
7/2/2020    [address]            Dear Essence Salgado,      Our records indicate that you are due for an appointment for a EGD or Upper Endoscopy with Cristino Landrum MD.    Please call our office to s

## (undated) NOTE — MR AVS SNAPSHOT
After Visit Summary   3/23/2021    Tien Hyde    MRN: [id number]           Visit Information     Date & Time  3/23/2021 10:15 AM Provider  Jacey Sanabria, [facility], [address] [place] [address],[address], [facility]  [place] [[id number] CUSTOM]     HPV HIGH RISK , THIN PREP COLLECTION [[id number] CUSTOM]     THINPREP PAP SMEAR B [[id number] CUSTOM]     THINPREP PAP SMEAR ONLY [[id number] CUSTOM]     Future Labs/Procedures Expected by Expires    GENITAL VAGINOSIS SCREEN [[id number] CUSTOM]  3 SAME DAY APPOINTMENTS   Available at primary care offices    AFTER HOURS CARE  [place]  OFFICE VISIT   Primary Care Providers  Treatment for mild illness or injury that does not require immediate attention.  Average cost  $70*   [facility]  [facility] –

## (undated) NOTE — LETTER
5/22/2021            RE:        Claudeen Darby        [id number] [place] [address]         To Whom It May Concern,    Claudeen Darby is currently under my care for the issue identified.  She is cleared to operate a motor vehicle without

## (undated) NOTE — ED AVS SNAPSHOT
[facility] Emergency Department    [address]     [place] [address]    Phone:  [phone]    Fax:  [phone]   MRN: [id number]    Department:  [facility] Emergency Department   Date of Visit:  5/2 It is our goal to assure that you are completely satisfied with every aspect of your visit today.   In an effort to constantly improve our service to you, we would appreciate any positive or negative feedback related to the care you received in our emergenc [employer] account. You may have had testing done that requires us to contact you. Please make sure we have your correct phone number on file.       I certified that I have received a copy of the aftercare instructions; that these instructions have been expl [employer] will allow you to access patient instructions from your recent visit,  view other health information, and more. To sign up or find more information, go to [email or web address] and click on the Sign Up Now link in the [employer] box.      Enter

## (undated) NOTE — ED AVS SNAPSHOT
Zack Kimbrough   MRN: [id number]    Department:  [facility] Emergency Department   Date of Visit:  1/17/2019           Disclosure     Insurance plans vary and the physician(s) referred by the ER may not be covered by your plan.  Please contact CARE PHYSICIAN AT ONCE OR RETURN IMMEDIATELY TO THE EMERGENCY DEPARTMENT. If you have been prescribed any medication(s), please fill your prescription right away and begin taking the medication(s) as directed.   If you believe that any of the medications

## (undated) NOTE — LETTER
08/17/20        [address] [facility]      Dear Kimberly Rashid,    [address] [facility] records indicate that you have outstanding lab work and or testing that was ordered for you and has not yet been completed:       CT ABDOMEN+PELVIS(CONTRAST ONLY